# Patient Record
Sex: FEMALE | Race: BLACK OR AFRICAN AMERICAN | NOT HISPANIC OR LATINO | URBAN - METROPOLITAN AREA
[De-identification: names, ages, dates, MRNs, and addresses within clinical notes are randomized per-mention and may not be internally consistent; named-entity substitution may affect disease eponyms.]

---

## 2021-01-19 ENCOUNTER — INPATIENT (INPATIENT)
Facility: HOSPITAL | Age: 61
LOS: 5 days | Discharge: HOME | End: 2021-01-25
Attending: HOSPITALIST | Admitting: HOSPITALIST
Payer: MEDICAID

## 2021-01-19 VITALS
DIASTOLIC BLOOD PRESSURE: 119 MMHG | HEART RATE: 98 BPM | TEMPERATURE: 97 F | SYSTOLIC BLOOD PRESSURE: 243 MMHG | OXYGEN SATURATION: 100 % | RESPIRATION RATE: 20 BRPM

## 2021-01-19 LAB
ALBUMIN SERPL ELPH-MCNC: 4.3 G/DL — SIGNIFICANT CHANGE UP (ref 3.3–5)
ALP SERPL-CCNC: 182 U/L — HIGH (ref 40–120)
ALT FLD-CCNC: 13 U/L — SIGNIFICANT CHANGE UP (ref 4–33)
ANION GAP SERPL CALC-SCNC: 10 MMOL/L — SIGNIFICANT CHANGE UP (ref 7–14)
APTT BLD: 37 SEC — HIGH (ref 27–36.3)
AST SERPL-CCNC: 16 U/L — SIGNIFICANT CHANGE UP (ref 4–32)
BASE EXCESS BLDV CALC-SCNC: 4.3 MMOL/L — HIGH (ref -3–2)
BASOPHILS # BLD AUTO: 0.02 K/UL — SIGNIFICANT CHANGE UP (ref 0–0.2)
BASOPHILS NFR BLD AUTO: 0.2 % — SIGNIFICANT CHANGE UP (ref 0–2)
BILIRUB SERPL-MCNC: 0.2 MG/DL — SIGNIFICANT CHANGE UP (ref 0.2–1.2)
BUN SERPL-MCNC: 8 MG/DL — SIGNIFICANT CHANGE UP (ref 7–23)
CALCIUM SERPL-MCNC: 9.9 MG/DL — SIGNIFICANT CHANGE UP (ref 8.4–10.5)
CHLORIDE SERPL-SCNC: 102 MMOL/L — SIGNIFICANT CHANGE UP (ref 98–107)
CO2 SERPL-SCNC: 26 MMOL/L — SIGNIFICANT CHANGE UP (ref 22–31)
CREAT SERPL-MCNC: 0.69 MG/DL — SIGNIFICANT CHANGE UP (ref 0.5–1.3)
EOSINOPHIL # BLD AUTO: 0.06 K/UL — SIGNIFICANT CHANGE UP (ref 0–0.5)
EOSINOPHIL NFR BLD AUTO: 0.5 % — SIGNIFICANT CHANGE UP (ref 0–6)
GLUCOSE SERPL-MCNC: 262 MG/DL — HIGH (ref 70–99)
HCO3 BLDV-SCNC: 26 MMOL/L — SIGNIFICANT CHANGE UP (ref 20–27)
HCT VFR BLD CALC: 49.2 % — HIGH (ref 34.5–45)
HGB BLD-MCNC: 14.7 G/DL — SIGNIFICANT CHANGE UP (ref 11.5–15.5)
IANC: 5.69 K/UL — SIGNIFICANT CHANGE UP (ref 1.5–8.5)
IMM GRANULOCYTES NFR BLD AUTO: 0.2 % — SIGNIFICANT CHANGE UP (ref 0–1.5)
INR BLD: 1.02 RATIO — SIGNIFICANT CHANGE UP (ref 0.88–1.16)
LYMPHOCYTES # BLD AUTO: 4.74 K/UL — HIGH (ref 1–3.3)
LYMPHOCYTES # BLD AUTO: 41.9 % — SIGNIFICANT CHANGE UP (ref 13–44)
MCHC RBC-ENTMCNC: 25.5 PG — LOW (ref 27–34)
MCHC RBC-ENTMCNC: 29.9 GM/DL — LOW (ref 32–36)
MCV RBC AUTO: 85.4 FL — SIGNIFICANT CHANGE UP (ref 80–100)
MONOCYTES # BLD AUTO: 0.79 K/UL — SIGNIFICANT CHANGE UP (ref 0–0.9)
MONOCYTES NFR BLD AUTO: 7 % — SIGNIFICANT CHANGE UP (ref 2–14)
NEUTROPHILS # BLD AUTO: 5.69 K/UL — SIGNIFICANT CHANGE UP (ref 1.8–7.4)
NEUTROPHILS NFR BLD AUTO: 50.2 % — SIGNIFICANT CHANGE UP (ref 43–77)
NRBC # BLD: 0 /100 WBCS — SIGNIFICANT CHANGE UP
NRBC # FLD: 0 K/UL — SIGNIFICANT CHANGE UP
PCO2 BLDV: 57 MMHG — HIGH (ref 41–51)
PH BLDV: 7.34 — SIGNIFICANT CHANGE UP (ref 7.32–7.43)
PLATELET # BLD AUTO: 371 K/UL — SIGNIFICANT CHANGE UP (ref 150–400)
PO2 BLDV: 28 MMHG — LOW (ref 35–40)
POTASSIUM SERPL-MCNC: 4.1 MMOL/L — SIGNIFICANT CHANGE UP (ref 3.5–5.3)
POTASSIUM SERPL-SCNC: 4.1 MMOL/L — SIGNIFICANT CHANGE UP (ref 3.5–5.3)
PROT SERPL-MCNC: 7.8 G/DL — SIGNIFICANT CHANGE UP (ref 6–8.3)
PROTHROM AB SERPL-ACNC: 11.6 SEC — SIGNIFICANT CHANGE UP (ref 10.6–13.6)
RBC # BLD: 5.76 M/UL — HIGH (ref 3.8–5.2)
RBC # FLD: 13.2 % — SIGNIFICANT CHANGE UP (ref 10.3–14.5)
SAO2 % BLDV: 48.6 % — LOW (ref 60–85)
SODIUM SERPL-SCNC: 138 MMOL/L — SIGNIFICANT CHANGE UP (ref 135–145)
TROPONIN T, HIGH SENSITIVITY RESULT: 6 NG/L — SIGNIFICANT CHANGE UP
WBC # BLD: 11.32 K/UL — HIGH (ref 3.8–10.5)
WBC # FLD AUTO: 11.32 K/UL — HIGH (ref 3.8–10.5)

## 2021-01-19 PROCEDURE — 70496 CT ANGIOGRAPHY HEAD: CPT | Mod: 26

## 2021-01-19 PROCEDURE — 99285 EMERGENCY DEPT VISIT HI MDM: CPT

## 2021-01-19 PROCEDURE — 70498 CT ANGIOGRAPHY NECK: CPT | Mod: 26

## 2021-01-19 PROCEDURE — 70450 CT HEAD/BRAIN W/O DYE: CPT | Mod: 26,59

## 2021-01-19 RX ORDER — LABETALOL HCL 100 MG
10 TABLET ORAL ONCE
Refills: 0 | Status: COMPLETED | OUTPATIENT
Start: 2021-01-19 | End: 2021-01-19

## 2021-01-19 RX ADMIN — Medication 10 MILLIGRAM(S): at 23:03

## 2021-01-19 NOTE — ED ADULT NURSE REASSESSMENT NOTE - NS ED NURSE REASSESS COMMENT FT1
Report received from facilitator GERARD PIPER Pt. received A&Ox4, with 18G IV in left ac. In no acute distress. Respirations even & unlabored. Side rails up, stretcher in lowest position, Will continue to monitor.

## 2021-01-19 NOTE — ED ADULT TRIAGE NOTE - CHIEF COMPLAINT QUOTE
Chief complaint left visual changes "blurred vision" starting 9am this morning. symmetrical smile. equal facial sensation. no pronator drift. HX DM type 2 and HTN, non compliant with medications for diabetes. Code Stroke called. Pt taken directly to CT. FS taken. Charge RN aware

## 2021-01-19 NOTE — ED PROVIDER NOTE - CLINICAL SUMMARY MEDICAL DECISION MAKING FREE TEXT BOX
60F with hx of HTN, DM (non-compliant with medications) presents as a code stroke. patient c/o blurry and double vision from the left eye with HA since waking up this morning.  plan  - labs  - ct head, cta head/neck  - ekg  - neuro  - BP control  - admit

## 2021-01-19 NOTE — ED PROVIDER NOTE - ATTENDING CONTRIBUTION TO CARE
60F with hx of HTN, DM (non-compliant with medications) presents as a code stroke. patient c/o blurry and double vision from the left eye with HA since waking up this morning. mentioned it to her son who brought her to the ED. denies any eye pain, weakness, numbness, tingling, diff swallowing or speaking.     ***GEN - NAD; well appearing; A+O x3 ***HEAD - NC/AT ***EYES/NOSE - PERRL, mucous membranes moist, no discharge ***THROAT: Oral cavity and pharynx normal. No inflammation, swelling, exudate, or lesions.  ***NECK: Neck supple, non-tender without lymphadenopathy, no masses, no thyromegaly.   ***PULMONARY - CTA b/l, symmetric breath sounds. ***CARDIAC -s1s2, RRR, no M,G,R  ***ABDOMEN - +BS, ND, NT, soft, no guarding, no rebound, no masses   ***BACK - no CVA tenderness, Normal  spine ***EXTREMITIES - symmetric pulses, 2+ dp, capillary refill < 2 seconds, no clubbing, no cyanosis, no edema ***SKIN - no rash or bruising   ***NEUROLOGIC - alert, CN 2-12 intact on right with left sided lateral gaze deficit, reflexes nl, sensation nl, coordination nl, finger to nose nl, romberg negative, motor 5/5 RUE/LUE/RLE/LLE/EHL/Plantar flexion, no pronator drift, gait nl, ***PSYCH - insight and judgment nl, memory nl, affect nl, thought nl    MDM: 60F with likely acute cva. code stroke activated. neuro eval, cth, cta head and neck, labs, mri, bp and dm mgmt. 60F with hx of HTN, DM (non-compliant with medications) presents as a code stroke. patient c/o blurry and double vision from the left eye with HA since waking up this morning. mentioned it to her son who brought her to the ED. denies any eye pain, weakness, numbness, tingling, diff swallowing or speaking.     ***GEN - NAD; well appearing; A+O x3 ***HEAD - NC/AT ***EYES/NOSE - PERRL, mucous membranes moist, no discharge ***THROAT: Oral cavity and pharynx normal. No inflammation, swelling, exudate, or lesions.  ***NECK: Neck supple, non-tender without lymphadenopathy, no masses, no thyromegaly.   ***PULMONARY - CTA b/l, symmetric breath sounds. ***CARDIAC -s1s2, RRR, no M,G,R  ***ABDOMEN - +BS, ND, NT, soft, no guarding, no rebound, no masses   ***BACK - no CVA tenderness, Normal  spine ***EXTREMITIES - symmetric pulses, 2+ dp, capillary refill < 2 seconds, no clubbing, no cyanosis, no edema ***SKIN - no rash or bruising   ***NEUROLOGIC - alert, CN 2-12 intact on right with left sided lateral gaze deficit, reflexes nl, sensation nl, coordination nl, finger to nose nl, romberg negative, motor 5/5 RUE/LUE/RLE/LLE/EHL/Plantar flexion, no pronator drift, gait nl, ***PSYCH - insight and judgment nl, memory nl, affect nl, thought nl.    MDM: 60F with likely acute cva. code stroke activated. neuro eval, cth, cta head and neck, labs, mri, bp and dm mgmt.

## 2021-01-19 NOTE — ED ADULT TRIAGE NOTE - ARRIVAL FROM
Notified pt and pt voiced understanding , labs appt made and mailed . Pt states he was suppose to get a Open mri done  Please advise , I do not see any orders    Home

## 2021-01-19 NOTE — ED PROVIDER NOTE - OBJECTIVE STATEMENT
60F with hx of HTN, DM (non-compliant with medications) presents as a code stroke. patient c/o blurry and double vision from the left eye with HA since waking up this morning. mentioned it to her son who brought her to the ED. denies any eye pain, weakness, numbness, tingling, diff swallowing or speaking. CODE STROKE called in triage. Neuro bedside.

## 2021-01-19 NOTE — ED PROVIDER NOTE - NEUROLOGICAL, MLM
alert, CN 2-12 intact on right with left sided lateral gaze deficit, reflexes nl, sensation nl, coordination nl, finger to nose nl, romberg negative, motor 5/5 RUE/LUE/RLE/LLE/EHL/Plantar flexion, no pronator drift, gait nl

## 2021-01-19 NOTE — ED ADULT NURSE NOTE - OBJECTIVE STATEMENT
Nathanael RN: 61 y/o F received to CT scan for a code stroke. Pt A&ox4 and ambulatory. Pt states since this morning she has had L eye vision changes. Pt endorses double vision, ha, and unsteady gait. Pt respirations even and unlabored. pt abdomen soft nontender nondistended. pt skin intact. Pt denies fevers, chills, sob, degroot, lightheadedness, dizziness, or palpations. Vital signs as noted, call bell in reach, comfort measures provide 18G IV placed L AC, labs drawn and sent, will give report to primary RN. f

## 2021-01-20 DIAGNOSIS — R94.31 ABNORMAL ELECTROCARDIOGRAM [ECG] [EKG]: ICD-10-CM

## 2021-01-20 DIAGNOSIS — H53.9 UNSPECIFIED VISUAL DISTURBANCE: ICD-10-CM

## 2021-01-20 DIAGNOSIS — E78.5 HYPERLIPIDEMIA, UNSPECIFIED: ICD-10-CM

## 2021-01-20 DIAGNOSIS — D72.829 ELEVATED WHITE BLOOD CELL COUNT, UNSPECIFIED: ICD-10-CM

## 2021-01-20 DIAGNOSIS — E11.9 TYPE 2 DIABETES MELLITUS WITHOUT COMPLICATIONS: ICD-10-CM

## 2021-01-20 DIAGNOSIS — I16.0 HYPERTENSIVE URGENCY: ICD-10-CM

## 2021-01-20 DIAGNOSIS — R79.89 OTHER SPECIFIED ABNORMAL FINDINGS OF BLOOD CHEMISTRY: ICD-10-CM

## 2021-01-20 DIAGNOSIS — Z02.9 ENCOUNTER FOR ADMINISTRATIVE EXAMINATIONS, UNSPECIFIED: ICD-10-CM

## 2021-01-20 DIAGNOSIS — I10 ESSENTIAL (PRIMARY) HYPERTENSION: ICD-10-CM

## 2021-01-20 DIAGNOSIS — Z29.9 ENCOUNTER FOR PROPHYLACTIC MEASURES, UNSPECIFIED: ICD-10-CM

## 2021-01-20 DIAGNOSIS — Z79.899 OTHER LONG TERM (CURRENT) DRUG THERAPY: ICD-10-CM

## 2021-01-20 DIAGNOSIS — E11.65 TYPE 2 DIABETES MELLITUS WITH HYPERGLYCEMIA: ICD-10-CM

## 2021-01-20 LAB
A1C WITH ESTIMATED AVERAGE GLUCOSE RESULT: 13.5 % — HIGH (ref 4–5.6)
ANION GAP SERPL CALC-SCNC: 7 MMOL/L — SIGNIFICANT CHANGE UP (ref 7–14)
BASOPHILS # BLD AUTO: 0.01 K/UL — SIGNIFICANT CHANGE UP (ref 0–0.2)
BASOPHILS NFR BLD AUTO: 0.1 % — SIGNIFICANT CHANGE UP (ref 0–2)
BUN SERPL-MCNC: 7 MG/DL — SIGNIFICANT CHANGE UP (ref 7–23)
CALCIUM SERPL-MCNC: 9.6 MG/DL — SIGNIFICANT CHANGE UP (ref 8.4–10.5)
CHLORIDE SERPL-SCNC: 103 MMOL/L — SIGNIFICANT CHANGE UP (ref 98–107)
CHOLEST SERPL-MCNC: 175 MG/DL — SIGNIFICANT CHANGE UP
CO2 SERPL-SCNC: 27 MMOL/L — SIGNIFICANT CHANGE UP (ref 22–31)
CREAT SERPL-MCNC: 0.63 MG/DL — SIGNIFICANT CHANGE UP (ref 0.5–1.3)
EOSINOPHIL # BLD AUTO: 0.04 K/UL — SIGNIFICANT CHANGE UP (ref 0–0.5)
EOSINOPHIL NFR BLD AUTO: 0.4 % — SIGNIFICANT CHANGE UP (ref 0–6)
ESTIMATED AVERAGE GLUCOSE: 341 MG/DL — HIGH (ref 68–114)
GLUCOSE BLDC GLUCOMTR-MCNC: 131 MG/DL — HIGH (ref 70–99)
GLUCOSE BLDC GLUCOMTR-MCNC: 224 MG/DL — HIGH (ref 70–99)
GLUCOSE BLDC GLUCOMTR-MCNC: 243 MG/DL — HIGH (ref 70–99)
GLUCOSE BLDC GLUCOMTR-MCNC: 274 MG/DL — HIGH (ref 70–99)
GLUCOSE SERPL-MCNC: 285 MG/DL — HIGH (ref 70–99)
HCT VFR BLD CALC: 44.5 % — SIGNIFICANT CHANGE UP (ref 34.5–45)
HDLC SERPL-MCNC: 33 MG/DL — LOW
HGB BLD-MCNC: 14.1 G/DL — SIGNIFICANT CHANGE UP (ref 11.5–15.5)
IANC: 6.72 K/UL — SIGNIFICANT CHANGE UP (ref 1.5–8.5)
IMM GRANULOCYTES NFR BLD AUTO: 0.6 % — SIGNIFICANT CHANGE UP (ref 0–1.5)
LIPID PNL WITH DIRECT LDL SERPL: 123 MG/DL — HIGH
LYMPHOCYTES # BLD AUTO: 2.57 K/UL — SIGNIFICANT CHANGE UP (ref 1–3.3)
LYMPHOCYTES # BLD AUTO: 25.6 % — SIGNIFICANT CHANGE UP (ref 13–44)
MAGNESIUM SERPL-MCNC: 1.7 MG/DL — SIGNIFICANT CHANGE UP (ref 1.6–2.6)
MCHC RBC-ENTMCNC: 26.4 PG — LOW (ref 27–34)
MCHC RBC-ENTMCNC: 31.7 GM/DL — LOW (ref 32–36)
MCV RBC AUTO: 83.3 FL — SIGNIFICANT CHANGE UP (ref 80–100)
MONOCYTES # BLD AUTO: 0.65 K/UL — SIGNIFICANT CHANGE UP (ref 0–0.9)
MONOCYTES NFR BLD AUTO: 6.5 % — SIGNIFICANT CHANGE UP (ref 2–14)
NEUTROPHILS # BLD AUTO: 6.72 K/UL — SIGNIFICANT CHANGE UP (ref 1.8–7.4)
NEUTROPHILS NFR BLD AUTO: 66.8 % — SIGNIFICANT CHANGE UP (ref 43–77)
NON HDL CHOLESTEROL: 142 MG/DL — HIGH
NRBC # BLD: 0 /100 WBCS — SIGNIFICANT CHANGE UP
NRBC # FLD: 0 K/UL — SIGNIFICANT CHANGE UP
PHOSPHATE SERPL-MCNC: 3.5 MG/DL — SIGNIFICANT CHANGE UP (ref 2.5–4.5)
PLATELET # BLD AUTO: 349 K/UL — SIGNIFICANT CHANGE UP (ref 150–400)
POTASSIUM SERPL-MCNC: 4 MMOL/L — SIGNIFICANT CHANGE UP (ref 3.5–5.3)
POTASSIUM SERPL-SCNC: 4 MMOL/L — SIGNIFICANT CHANGE UP (ref 3.5–5.3)
RBC # BLD: 5.34 M/UL — HIGH (ref 3.8–5.2)
RBC # FLD: 13.1 % — SIGNIFICANT CHANGE UP (ref 10.3–14.5)
SARS-COV-2 RNA SPEC QL NAA+PROBE: SIGNIFICANT CHANGE UP
SODIUM SERPL-SCNC: 137 MMOL/L — SIGNIFICANT CHANGE UP (ref 135–145)
TRIGL SERPL-MCNC: 95 MG/DL — SIGNIFICANT CHANGE UP
TSH SERPL-MCNC: <0.1 UIU/ML — LOW (ref 0.27–4.2)
WBC # BLD: 10.05 K/UL — SIGNIFICANT CHANGE UP (ref 3.8–10.5)
WBC # FLD AUTO: 10.05 K/UL — SIGNIFICANT CHANGE UP (ref 3.8–10.5)

## 2021-01-20 PROCEDURE — 99223 1ST HOSP IP/OBS HIGH 75: CPT

## 2021-01-20 PROCEDURE — 99255 IP/OBS CONSLTJ NEW/EST HI 80: CPT

## 2021-01-20 PROCEDURE — 70551 MRI BRAIN STEM W/O DYE: CPT | Mod: 26

## 2021-01-20 PROCEDURE — 99222 1ST HOSP IP/OBS MODERATE 55: CPT

## 2021-01-20 RX ORDER — SODIUM CHLORIDE 9 MG/ML
3 INJECTION INTRAMUSCULAR; INTRAVENOUS; SUBCUTANEOUS EVERY 8 HOURS
Refills: 0 | Status: DISCONTINUED | OUTPATIENT
Start: 2021-01-20 | End: 2021-01-25

## 2021-01-20 RX ORDER — INSULIN NPH HUM/REG INSULIN HM 70-30/ML
3 VIAL (ML) SUBCUTANEOUS
Qty: 2 | Refills: 0
Start: 2021-01-20

## 2021-01-20 RX ORDER — INSULIN GLARGINE 100 [IU]/ML
12 INJECTION, SOLUTION SUBCUTANEOUS AT BEDTIME
Refills: 0 | Status: DISCONTINUED | OUTPATIENT
Start: 2021-01-20 | End: 2021-01-25

## 2021-01-20 RX ORDER — GLUCAGON INJECTION, SOLUTION 0.5 MG/.1ML
1 INJECTION, SOLUTION SUBCUTANEOUS ONCE
Refills: 0 | Status: DISCONTINUED | OUTPATIENT
Start: 2021-01-20 | End: 2021-01-25

## 2021-01-20 RX ORDER — DEXTROSE 50 % IN WATER 50 %
25 SYRINGE (ML) INTRAVENOUS ONCE
Refills: 0 | Status: DISCONTINUED | OUTPATIENT
Start: 2021-01-20 | End: 2021-01-25

## 2021-01-20 RX ORDER — INSULIN LISPRO 100/ML
4 VIAL (ML) SUBCUTANEOUS
Refills: 0 | Status: DISCONTINUED | OUTPATIENT
Start: 2021-01-20 | End: 2021-01-21

## 2021-01-20 RX ORDER — AMLODIPINE BESYLATE 2.5 MG/1
5 TABLET ORAL DAILY
Refills: 0 | Status: DISCONTINUED | OUTPATIENT
Start: 2021-01-20 | End: 2021-01-20

## 2021-01-20 RX ORDER — METFORMIN HYDROCHLORIDE 850 MG/1
1 TABLET ORAL
Qty: 0 | Refills: 0 | DISCHARGE

## 2021-01-20 RX ORDER — DEXTROSE 50 % IN WATER 50 %
12.5 SYRINGE (ML) INTRAVENOUS ONCE
Refills: 0 | Status: DISCONTINUED | OUTPATIENT
Start: 2021-01-20 | End: 2021-01-25

## 2021-01-20 RX ORDER — INSULIN LISPRO 100/ML
VIAL (ML) SUBCUTANEOUS
Refills: 0 | Status: DISCONTINUED | OUTPATIENT
Start: 2021-01-20 | End: 2021-01-25

## 2021-01-20 RX ORDER — HEPARIN SODIUM 5000 [USP'U]/ML
5000 INJECTION INTRAVENOUS; SUBCUTANEOUS EVERY 12 HOURS
Refills: 0 | Status: DISCONTINUED | OUTPATIENT
Start: 2021-01-20 | End: 2021-01-25

## 2021-01-20 RX ORDER — INSULIN LISPRO 100/ML
VIAL (ML) SUBCUTANEOUS AT BEDTIME
Refills: 0 | Status: DISCONTINUED | OUTPATIENT
Start: 2021-01-20 | End: 2021-01-25

## 2021-01-20 RX ORDER — DEXTROSE 50 % IN WATER 50 %
15 SYRINGE (ML) INTRAVENOUS ONCE
Refills: 0 | Status: DISCONTINUED | OUTPATIENT
Start: 2021-01-20 | End: 2021-01-25

## 2021-01-20 RX ORDER — SODIUM CHLORIDE 9 MG/ML
1000 INJECTION, SOLUTION INTRAVENOUS
Refills: 0 | Status: DISCONTINUED | OUTPATIENT
Start: 2021-01-20 | End: 2021-01-25

## 2021-01-20 RX ORDER — ACETAMINOPHEN 500 MG
650 TABLET ORAL ONCE
Refills: 0 | Status: COMPLETED | OUTPATIENT
Start: 2021-01-20 | End: 2021-01-20

## 2021-01-20 RX ADMIN — Medication 650 MILLIGRAM(S): at 15:57

## 2021-01-20 RX ADMIN — Medication 4 UNIT(S): at 13:27

## 2021-01-20 RX ADMIN — Medication 2: at 09:20

## 2021-01-20 RX ADMIN — Medication 2: at 13:27

## 2021-01-20 RX ADMIN — HEPARIN SODIUM 5000 UNIT(S): 5000 INJECTION INTRAVENOUS; SUBCUTANEOUS at 05:47

## 2021-01-20 RX ADMIN — HEPARIN SODIUM 5000 UNIT(S): 5000 INJECTION INTRAVENOUS; SUBCUTANEOUS at 18:03

## 2021-01-20 RX ADMIN — Medication 4 UNIT(S): at 18:16

## 2021-01-20 RX ADMIN — SODIUM CHLORIDE 3 MILLILITER(S): 9 INJECTION INTRAMUSCULAR; INTRAVENOUS; SUBCUTANEOUS at 12:23

## 2021-01-20 RX ADMIN — SODIUM CHLORIDE 3 MILLILITER(S): 9 INJECTION INTRAMUSCULAR; INTRAVENOUS; SUBCUTANEOUS at 05:01

## 2021-01-20 RX ADMIN — Medication 650 MILLIGRAM(S): at 06:09

## 2021-01-20 NOTE — H&P ADULT - PROBLEM SELECTOR PLAN 1
Admit to tele, continue monitoring.  Neuro recs appreciated.  Permissive HTN up to 220/110 for first 48-72 hours after sympton onset to be followed by gradual normotension.  MRI brain w/o contrast ordered.  Echo with bubble study ordered.  Aspirin 81 mg daily  Atorvastatin 80 mg daily. Titrate for LDL <70.  A1c and lipid panel ordered with AM labs.  Heparin subcutaneous 5000 units q12h.  Patient will need close outpatient PCP follow up management of medical comorbidities and medication compliance.  Upon discharge, please have patient follow up with Stroke Neurology within 1 week:  Tamika Marc NP at [address] [place] [address], [address] [place]  [phone]  Please email patients info to [email or web address] Admit to tele, continue monitoring.  Neuro recs appreciated.  Permissive HTN up to 220/110 for first 48-72 hours after sympton onset to be followed by gradual normotension.  MRI brain w/o contrast ordered.  Echo with bubble study ordered.  Aspirin 81 mg daily  Atorvastatin 80 mg daily. Titrate for LDL <70.  A1c and lipid panel ordered with AM labs.  Heparin subcutaneous 5000 units q12h.  Dysphagia screen passed. Dysphagia diet ordered.  Patient will need close outpatient PCP follow up management of medical comorbidities and medication compliance.  Upon discharge, please have patient follow up with Stroke Neurology within 1 week:  Tamika Marc NP at [address] [facility], [address] [place]  [phone]  Please email patients info to [email or web address] Likely pontine CVA  Admit to tele, continue monitoring.  Neuro recs appreciated.  Permissive HTN up to 220/110 for first 48-72 hours after sympton onset to be followed by gradual normotension.  MRI brain w/o contrast ordered.  Echo with bubble study ordered.  Aspirin 81 mg daily  Atorvastatin 80 mg daily. Titrate for LDL <70.  A1c and lipid panel ordered with AM labs.  Heparin subcutaneous 5000 units q12h.  Dysphagia screen passed. Dysphagia diet ordered.  Patient will need close outpatient PCP follow up management of medical comorbidities and medication compliance.  Upon discharge, please have patient follow up with Stroke Neurology within 1 week:  Tamika Marc NP at [address] [facility], [address] [place]  [phone]  Please email patients info to [email or web address]

## 2021-01-20 NOTE — H&P ADULT - HISTORY OF PRESENT ILLNESS
59 y/o Norwegian creole speaking F with PMH of HTN and type 2 diabetes (On Metformin) presents to the ED complaining of blurry vision in left eye and headcache. Patient states that she woke up yesterday morning with blurry vision in left her eye. She states that she also had a headache and states that she still has it. Patient states that she checked her blood sugar and found it to be in the 200s and decided to come to the ED for evaluation. Patient denies slurred speech, weakness, numbness in extremities. Patient sates that her diabetes and blood pressure medication has finished and that she has not been able to get more. Patient is on metformin 1000 mg BID, glipizide 10 mg daily. Patient is unable to recall what blood pressure medication she is on. Patient denies fever, chills, chest pain, shortness of breath, nausea, vomiting, dysuria.

## 2021-01-20 NOTE — PHYSICAL THERAPY INITIAL EVALUATION ADULT - GENERAL OBSERVATIONS, REHAB EVAL
Consult received, chart reviewed. Patient received standing in room, no apparent distress, +tele, Arlyn WALLACE RN present.

## 2021-01-20 NOTE — SPEECH LANGUAGE PATHOLOGY EVALUATION - SLP PATIENT PROFILE REVIEW
----- Message from Donna Forte MD sent at 1/11/2019  5:32 PM EST -----  Mammogram normal.  Repeat 1 year  
yes

## 2021-01-20 NOTE — OCCUPATIONAL THERAPY INITIAL EVALUATION ADULT - PERTINENT HX OF CURRENT PROBLEM, REHAB EVAL
59 y/o [place] Islander creole speaking F with PMH of HTN and type 2 diabetes (On Metformin) presents to the ED complaining of blurry vision in left eye and headache. Pt is a 61 y/o Venezuelan creole speaking F with PMH of HTN and type 2 diabetes (On Metformin) presents to the ED complaining of blurry vision in left eye and headache.

## 2021-01-20 NOTE — H&P ADULT - NSHPLABSRESULTS_GEN_ALL_CORE
Vital Signs Last 24 Hrs  T(C): 36.8 (20 Jan 2021 02:52), Max: 37.1 (20 Jan 2021 02:17)  T(F): 98.3 (20 Jan 2021 02:52), Max: 98.8 (20 Jan 2021 02:17)  HR: 89 (20 Jan 2021 02:52) (85 - 98)  BP: 160/88 (20 Jan 2021 02:52) (160/88 - 249/102)  BP(mean): --  RR: 17 (20 Jan 2021 02:52) (16 - 20)  SpO2: 98% (20 Jan 2021 02:52) (98% - 100%)                          14.7   11.32 )-----------( 371      ( 19 Jan 2021 23:06 )             49.2   01-19    138  |  102  |  8   ----------------------------<  262<H>  4.1   |  26  |  0.69    Ca    9.9      19 Jan 2021 23:06    TPro  7.8  /  Alb  4.3  /  TBili  0.2  /  DBili  x   /  AST  16  /  ALT  13  /  AlkPhos  182<H>  01-19    PT/INR - ( 19 Jan 2021 23:06 )   PT: 11.6 sec;   INR: 1.02 ratio         PTT - ( 19 Jan 2021 23:06 )  PTT:37.0 sec    23:06 - VBG - pH: 7.34  | pCO2: 57    | pO2: 28    | Lactate:          COVID PCR: Negative.    CTA Head and Neck:  CTA neck: Mild stenosis proximal right internal carotid artery by NASCET criteria of about 39%. No focal occlusion, hemodynamically significant stenosis or dissection.    Asymmetric right aryepiglottic fold enhancement which could be related to asymmetry in the venous plexus drainage, however, associated effacement of the right pyriform sinuses somewhat concerning. Follow-up with ENT is recommended with correlation with direct visualization. Nonemergent follow-up contrast enhanced neck MRI can also be performed. Heterogeneous enlarged thyroid gland with bilateral nodules as above.    CTA head: No focal occlusion or hemodynamically significant proximal stenosis. 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 No additional aneurysms or vascular malformation.    CT Brain:  There is no evidence of an acute intracranial hemorrhage, mass effect from vasogenic edema or evidence for acute large vascular territory infarct. Partially empty sella incidentally noted. The basal cisterns are not effaced. No downward herniation of the cerebellar tonsils.    The ventricles, sulci and cisternal spaces are unremarkable in size and configuration for the patient's stated age. There is no midline shift or extra-axial fluid collection.    The visualized paranasal sinuses and mastoid air cells are clear.  The globes are symmetric in size and contour. No displaced calvarial fracture.    IMPRESSION:  No evidence of acute intracranial hemorrhage, midline shift, hydrocephalus or signs of raised intracranial pressure.    EKG: Sinus rhythm at 93 bpm. QT/QTc 432/537.

## 2021-01-20 NOTE — PHYSICAL THERAPY INITIAL EVALUATION ADULT - PERTINENT HX OF CURRENT PROBLEM, REHAB EVAL
Pt. admitted for visual changes, hypertensive urgency. 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

## 2021-01-20 NOTE — CONSULT NOTE ADULT - PROBLEM SELECTOR RECOMMENDATION 9
- A1c 13.5%, has not been on medications for months  - start basal bolus insulin - Lantus 12 units qhs and Admelog 4 units before meals (self reported weight is 142 lbs)  - low correction scale qac and qhs  - consistent carb diet  - check FS qac and qhs  - RD consult  - will follow - A1c 13.5%, has not been on medications for months  - start basal bolus insulin - Lantus 12 units qhs and Admelog 4 units before meals (self reported weight is 142 lbs)  - low correction scale qac and qhs  - consistent carb diet  - check FS qac and qhs  - RD consult  - will follow  - for discharge: suggest dc on Humulin/Novolin 70/30, doses to be determined, which patient is agreeable to. If insulin requirements remain low, could potentially dc on Metformin and sulfonylurea. Recommend start insulin syringe and vial teaching with patient. She can follow up in the [facility] endocrine clinic on discharge, appt to follow.

## 2021-01-20 NOTE — CONSULT NOTE ADULT - PROBLEM SELECTOR RECOMMENDATION 4
- she has no prior hx of thyroid disease  - recheck TSH with free T4 and total T3 in AM  - may need work up for hyperthyroidism if TFTs consistent with subclinical or overt hyperthyroidism

## 2021-01-20 NOTE — SPEECH LANGUAGE PATHOLOGY EVALUATION - SLP DIAGNOSIS
Patient demonstrated intact receptive and expressive language abilities characterized by intact ability to follow multi-step directives, respond appropriately to simple open-ended questions and identify common objects,  as well as intact naming skills (i.e. confrontational, responsive, generative) and intact picture description. Cognitive linguistic functioning was also within functional limits given adequate orientation to current situation, intact verbal problem solving and short-term memory skills.  In addition, motor speech function was judged to be within functional limits with adequate rate and precise articulatory contacts. Vocal quality was unremarkable.

## 2021-01-20 NOTE — SWALLOW BEDSIDE ASSESSMENT ADULT - SWALLOW EVAL: DIAGNOSIS
Patient demonstrated functional oral and pharyngeal stages of swallowing characterized by adequate oral prep with adequate bolus cohesion, manipulation and transfer, adequate initiation of the pharyngeal onset and palpable hyolaryngeal elevation/excursion with no overt, clinical s/s of laryngeal penetration/aspiration across PO trials of puree, regular solids and thin liquids.

## 2021-01-20 NOTE — CONSULT NOTE ADULT - ASSESSMENT
60y old right handed female with history of HTN, DM2 who presents with L eye visual changes since this morning. LKW 9pm 1/18 prior to going to sleep. Neurologic exam with OS CN 6 palsy with L homonymous hemianopsia. 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 palsy w/ LHH possibly secondary to     Recommend:  [] Permissive HTN up to 220/120 mmHg for first 48-72 hours after the symptom onset followed by gradual normotension  [] MRI brain without contrast - preferably in the CDU, if not then please admit to 4S Telemetry  [] TTE with bubble study for possible valvular heart disease  [] ASA 81 mg PO QD   [] Lipitor 80 mg PO QHS, titrate for LDL , 70  [] HbA1c, LDL and continue with aggressive vascular risk factors modifications   [] DVT ppx if admitted   [] will need close outpatient PCP follow up for management of medical co-morbidities and strict medication compliance    Upon discharge, please have patient follow up with Stroke Neurology within 1 week:  Tamika Marc NP at [address] [facility], [address] [place]  [phone]  Please email patients info to [email or web address]   60y old right handed female with history of HTN, DM2 who presents with L eye visual changes since this morning. LKW 9pm 1/18 prior to going to sleep. Neurologic exam with OS CN 6 palsy with L homonymous hemianopsia, horizontal diplopia. 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 Less likely for nerve to be affected in the subarachnoid space, petrous apex or cavernous sinus given isolated findings. Can also consider diabetic/microvascular sixth nerve palsy as well.      Recommend:  [] Permissive HTN up to 220/120 mmHg for first 48-72 hours after the symptom onset followed by gradual normotension  [] MRI brain without contrast - preferably in the CDU, if not then please admit to 4S Telemetry  [] TTE with bubble study for possible valvular heart disease  [] ASA 81 mg PO QD   [] Lipitor 80 mg PO QHS, titrate for LDL , 70  [] HbA1c, LDL and continue with aggressive vascular risk factors modifications   [] DVT ppx if admitted   [] will need close outpatient PCP follow up for management of medical co-morbidities and strict medication compliance    Upon discharge, please have patient follow up with Stroke Neurology within 1 week:  Tamika Marc NP at [address] [facility], [address] [place]  [phone]  Please email patients info to [email or web address]

## 2021-01-20 NOTE — ED ADULT NURSE REASSESSMENT NOTE - NS ED NURSE REASSESS COMMENT FT1
No acute distress. Pt. admitted to [facility], awake & alert. All extremities strong and equal. Report given to [facility] 2 GERARD Narvaez. Pt. transported to [facility] 2 at present.

## 2021-01-20 NOTE — CONSULT NOTE ADULT - ASSESSMENT
60 year old Guatemalan creole speaking woman with PMH of HTN, HLD, uncontrolled DM2, presents to the ED complaining of blurry vision in left eye and headache, found to have possible pontine CVA. Consult called for management of uncontrolled DM2, A1c is 13.5%. Also found to have low TSH (<0.1). High risk patient with high level decision making, at high risk of worsening microvascular and macrovascular complications. BG goal 100-180 mg/dL.

## 2021-01-20 NOTE — H&P ADULT - NEUROLOGICAL DETAILS
alert and oriented x 3/responds to verbal commands/sensation intact/cranial nerves intact/normal strength alert and oriented x 3/responds to verbal commands/sensation intact/normal strength

## 2021-01-20 NOTE — CONSULT NOTE ADULT - SUBJECTIVE AND OBJECTIVE BOX
HPI:  Patient is a 60y old right handed female with history of HTN, DM2 who presents with L eye visual changes since this morning. LKW 9pm 1/18 prior to going to sleep. Patient is Creole speaking and her son interprets for her. Patient states she noticed blurry vision in her L eye since this morning but didnt seek evaluation, rather noticed her sugar was in the high 200s at home and came to the ED for hyperglycemia. She was previously told to take insulin but has not started taking it. Patient is also supposed to be taking ASA 81mg, but is no longer taking it per son. Patient is poorly compliant with her medications and has not been seeing doctors.   CT Head-  negative  CT Head/Neck - mild R ICA stenosis, 1mm Acomm aneurysm  NIHSS: 3, pre-MRS: 0  Not a tPA candidate as outside the window, not a MT candidate given lack of LVO and low NIHSS        MEDICATIONS  (STANDING):    MEDICATIONS  (PRN):    PAST MEDICAL & SURGICAL HISTORY:  No pertinent past medical history    No significant past surgical history      FAMILY HISTORY:    Allergies    No Known Allergies    Intolerances        SHx - No smoking, No ETOH, No drug abuse    Review of Systems:  A 10 system ROS was performed and is negative except for those mentioned in HPI      Vital Signs Last 24 Hrs  T(C): 35.9 (19 Jan 2021 22:00), Max: 35.9 (19 Jan 2021 22:00)  T(F): 96.6 (19 Jan 2021 22:00), Max: 96.6 (19 Jan 2021 22:00)  HR: 96 (19 Jan 2021 23:03) (93 - 98)  BP: 211/89 (19 Jan 2021 23:52) (211/89 - 249/102)  BP(mean): --  RR: 18 (19 Jan 2021 23:03) (18 - 20)  SpO2: 100% (19 Jan 2021 23:03) (100% - 100%)    Neurological (>12):  MS: Awake, alert, oriented to person, place, situation, time. Normal affect. Follows all commands.    Language: Speech is clear, fluent with good repetition & comprehension     CNs: PERRLA (R = 3mm, L = 3mm). OS CN 6 palsy with L homonymous hemianopsia V1-3 intact to LT/pinprick,  No facial asymmetry b/l, Tongue midline    Motor: Normal muscle bulk & tone. No drift.               Deltoid	Biceps	Triceps	Wrist	Finger ABd	   R	5	5	5	5	5		5 	  L	5	5	5	5	5		5    	H-Flex	H-Ext	H-ABd	H-ADd	K-Flex	K-Ext	D-Flex	P-Flex  R	5	5	5	5	5	5	5	5 	   L	5	5	5	5	5	5	5	5	     Sensation: Intact to LT throughout    Cortical: Extinction on DSS (neglect): none    Reflexes:              Biceps(C5)       BR(C6)     Triceps(C7)               Patellar(L4)    Achilles(S1)    Plantar Resp  R	2	          2	             2		        2		    2		Down   L	2	          2	             2		        2		    2		Down     Coordination: No dysmetria to FTN    Gait: deferred        01-19    138  |  102  |  8   ----------------------------<  262<H>  4.1   |  26  |  0.69    Ca    9.9      19 Jan 2021 23:06    TPro  7.8  /  Alb  4.3  /  TBili  0.2  /  DBili  x   /  AST  16  /  ALT  13  /  AlkPhos  182<H>  01-19 01-19    138  |  102  |  8   ----------------------------<  262<H>  4.1   |  26  |  0.69    Ca    9.9      19 Jan 2021 23:06    TPro  7.8  /  Alb  4.3  /  TBili  0.2  /  DBili  x   /  AST  16  /  ALT  13  /  AlkPhos  182<H>  01-19                          14.7   11.32 )-----------( 371      ( 19 Jan 2021 23:06 )             49.2       Radiology                 
Patient is a 60y old  Female who presents with a chief complaint of Visual changes, hypertensive urgency. (20 Jan 2021 11:51)      HPI:  59 y/o Russian creole speaking F with PMH of HTN and type 2 diabetes (On Metformin) presents to the ED complaining of blurry vision in left eye and headcache. Patient states that she woke up yesterday morning with blurry vision in left her eye. She states that she also had a headache and states that she still has it. Patient states that she checked her blood sugar and found it to be in the 200s and decided to come to the ED for evaluation. Patient denies slurred speech, weakness, numbness in extremities. Patient sates that her diabetes and blood pressure medication has finished and that she has not been able to get more. Patient is on metformin 1000 mg BID, glipizide 10 mg daily. Patient is unable to recall what blood pressure medication she is on. Patient denies fever, chills, chest pain, shortness of breath, nausea, vomiting, dysuria.  (20 Jan 2021 03:52)    Patient interviewed with elisabeth creole  ID [id number]  Patient reports mild headache 4/10, and blurry vision. Denies weakness or sensory loss.     REVIEW OF SYSTEMS  Constitutional - No fever, No weight loss, No fatigue  HEENT - No eye pain, + visual disturbances, No difficulty hearing, No tinnitus, No vertigo, No neck pain  Respiratory - No cough, No wheezing, No shortness of breath  Cardiovascular - No chest pain, No palpitations  Gastrointestinal - No abdominal pain, No nausea, No vomiting, No diarrhea, No constipation  Genitourinary - No dysuria, No frequency, No hematuria, No incontinence  Neurological - + headaches, No memory loss, No loss of strength, No numbness, No tremors  Skin - No itching, No rashes, No lesions   Endocrine - No temperature intolerance  Musculoskeletal - No joint pain, No joint swelling, No muscle pain  Psychiatric - No depression, No anxiety    PAST MEDICAL & SURGICAL HISTORY  Hypertension    Type 2 diabetes mellitus    No pertinent past medical history    No significant past surgical history        SOCIAL HISTORY  Smoking - Denied  EtOH - Denied   Drugs - Denied    FUNCTIONAL HISTORY  Lives with her children in apartment without stairs  Independent at baseline without device    CURRENT FUNCTIONAL STATUS    Transfer: Stand to Sit:     · Level of Mono	contact guard  · Physical Assist/Nonphysical Assist	1 person assist; nonverbal cues (demo/gestures); verbal cues  · Weight-Bearing Restrictions	weight-bearing as tolerated    Sit/Stand Transfer Safety Analysis:     · Impairments Contributing to Impaired Transfers	impaired balance; decreased strength    Gait Skills:     · Level of Mono	contact guard  · Physical Assist/Nonphysical Assist	1 person assist; nonverbal cues (demo/gestures); verbal cues  · Weight-Bearing Restrictions	weight-bearing as tolerated  · Assistive Device	PT handheld assist  · Gait Distance	25 feet x 2 trials      FAMILY HISTORY   FH: type 2 diabetes  FH: hypertension        RECENT LABS/IMAGING  < from: CT Brain Stroke Protocol (01.19.21 @ 22:29) >    EXAM:  CT BRAIN STROKE PROTOCOL        PROCEDURE DATE:  Jan 19 2021         INTERPRETATION:  CLINICAL INFORMATION: Code stroke. Left cranial nerve 6 palsy. Left eye vision changes.    TECHNIQUE: Noncontrast axial CT images were acquired of the head. Two-dimensional sagittal and coronal reformats were generated. RAPID artificial intelligence was used for perfusion analysis and for preliminary evaluation of intracranial hemorrhage.    COMPARISON STUDY: No similar prior comparisons available.    FINDINGS:  There is no evidence of an acute intracranial hemorrhage, mass effect from vasogenic edema or evidence for acute large vascular territory infarct. Partially empty sella incidentally noted. The basal cisterns are not effaced. No downward herniation of the cerebellar tonsils.    The ventricles, sulci and cisternal spaces are unremarkable in size and configuration for the patient's stated age. There is no midline shift or extra-axial fluid collection.    The visualized paranasal sinuses andmastoid air cells are clear.  The globes are symmetric in size and contour. No displaced calvarial fracture.    IMPRESSION:  No evidence of acute intracranial hemorrhage, midline shift, hydrocephalus or signs of raised intracranial pressure.    Thesefindings were discussed with Dr. Estrada at 1/19/2021 10:29 PM by Dr. Carolina with read back.        < end of copied text >    CBC Full  -  ( 20 Jan 2021 06:26 )  WBC Count : 10.05 K/uL  RBC Count : 5.34 M/uL  Hemoglobin : 14.1 g/dL  Hematocrit : 44.5 %  Platelet Count - Automated : 349 K/uL  Mean Cell Volume : 83.3 fL  Mean Cell Hemoglobin : 26.4 pg  Mean Cell Hemoglobin Concentration : 31.7 gm/dL  Auto Neutrophil # : 6.72 K/uL  Auto Lymphocyte # : 2.57 K/uL  Auto Monocyte # : 0.65 K/uL  Auto Eosinophil # : 0.04 K/uL  Auto Basophil # : 0.01 K/uL  Auto Neutrophil % : 66.8 %  Auto Lymphocyte % : 25.6 %  Auto Monocyte % : 6.5 %  Auto Eosinophil % : 0.4 %  Auto Basophil % : 0.1 %    01-20    137  |  103  |  7   ----------------------------<  285<H>  4.0   |  27  |  0.63    Ca    9.6      20 Jan 2021 06:26  Phos  3.5     01-20  Mg     1.7     01-20    TPro  7.8  /  Alb  4.3  /  TBili  0.2  /  DBili  x   /  AST  16  /  ALT  13  /  AlkPhos  182<H>  01-19        VITALS  T(C): 36.7 (01-20-21 @ 14:26), Max: 37.1 (01-20-21 @ 02:17)  HR: 98 (01-20-21 @ 14:26) (85 - 98)  BP: 176/92 (01-20-21 @ 14:26) (160/88 - 249/102)  RR: 17 (01-20-21 @ 14:26) (16 - 20)  SpO2: 99% (01-20-21 @ 14:26) (98% - 100%)  Wt(kg): --    ALLERGIES  No Known Allergies      MEDICATIONS   acetaminophen   Tablet .. 650 milliGRAM(s) Oral once  dextrose 40% Gel 15 Gram(s) Oral once  dextrose 5%. 1000 milliLiter(s) IV Continuous <Continuous>  dextrose 5%. 1000 milliLiter(s) IV Continuous <Continuous>  dextrose 50% Injectable 25 Gram(s) IV Push once  dextrose 50% Injectable 12.5 Gram(s) IV Push once  dextrose 50% Injectable 25 Gram(s) IV Push once  glucagon  Injectable 1 milliGRAM(s) IntraMuscular once  heparin   Injectable 5000 Unit(s) SubCutaneous every 12 hours  insulin glargine Injectable (LANTUS) 12 Unit(s) SubCutaneous at bedtime  insulin lispro (ADMELOG) corrective regimen sliding scale   SubCutaneous three times a day before meals  insulin lispro (ADMELOG) corrective regimen sliding scale   SubCutaneous at bedtime  insulin lispro Injectable (ADMELOG) 4 Unit(s) SubCutaneous three times a day before meals  sodium chloride 0.9% lock flush 3 milliLiter(s) IV Push every 8 hours      ----------------------------------------------------------------------------------------  PHYSICAL EXAM  Constitutional - NAD, Comfortable  HEENT - NCAT, 6th nerve palsy, limited L eye abduction  Neck - Supple, No limited ROM  Chest - no respiratory distress  Cardiovascular - no edema  Abdomen -  Soft, NTND  Extremities - No C/C/E, No calf tenderness   Neurologic Exam -                    Cognitive - Awake, Alert, AAO to self, place, date, year, situation     Communication - Fluent, No dysarthria     Cranial Nerves - 6th nerve palsy     Motor - No focal deficits                    LEFT    UE - ShAB 5/5, EF 5/5, EE 5/5, WE 5/5,  5/5                    RIGHT UE - ShAB 5/5, EF 5/5, EE 5/5, WE 5/5,  5/5                    LEFT    LE - HF 5/5, KE 5/5, DF 5/5, PF 5/5                    RIGHT LE - HF 5/5, KE 5/5, DF 5/5, PF 5/5        Sensory - Intact to LT      OculoVestibular - No saccades, No nystagmus, VOR         Balance - WNL Static  Psychiatric - Mood stable, Affect WNL  ----------------------------------------------------------------------------------------  ASSESSMENT/PLAN  60 year old female presenting with blurry vision and headache  R ICA stenosis, possible ischemia vs microvascular 6th nerve palsy.  MRI brain pending, TTE pending  Pain -tylenol prn  DVT PPX - heparin  continue bedside PT, OT eval in progress  Rehab -     Recommend DC HOME with outpatient PT/OT when medically cleared
  HPI:  Patient is a 60 year old Citizen of [place] creole speaking woman with PMH of HTN, HLD, uncontrolled DM2, presents to the ED complaining of blurry vision in left eye and headache, found to have possible pontine CVA. Consult called for management of uncontrolled DM2, A1c is 13.5%. Also found to have low TSH (<0.1). Patient seen at bedside. Cottle  ID# [id number]. Patient reports being diagnosed with DM2 more than 10 years ago, was following with her PCP, does not have an endocrinologist. Does not have insurance at this time. Was on Metformin 1 gram BID and Glipizide 10 mg ER daily, but ran out months ago so has not been taking. She was still checking BG periodically and noted this past week that her BG was in the 400's. She has neuropathy in the feet. No dilated eye exam in years, has never received laser or injections to the eyes. No known nephropathy.    Also found to have TSH < 0.1 here. She denies prior hx of thyroid disease and has never been on thyroidal medications. She does not have neck complaints, chest pain, abdominal pain. BP elevated here, but no tachycardia. Did receive IV contrast here. Reports hx of thyroid disease in her sister (unclear what).    States that she weighs about 142 lbs.     PAST MEDICAL & SURGICAL HISTORY:  Hypertension    Type 2 diabetes mellitus    No significant past surgical history    FAMILY HISTORY:  type 2 diabetes in multiple family members  + thyroid disease in sister   FH: hypertension      Social History:  no cigarette use  no alcohol lives  lives with son     Outpatient Medications:  Metformin 1 gram BID  Glipizide 10 mg ER daily  Amitriptyline 25 mg daily      MEDICATIONS  (STANDING):  dextrose 40% Gel 15 Gram(s) Oral once  dextrose 5%. 1000 milliLiter(s) (50 mL/Hr) IV Continuous <Continuous>  dextrose 5%. 1000 milliLiter(s) (100 mL/Hr) IV Continuous <Continuous>  dextrose 50% Injectable 25 Gram(s) IV Push once  dextrose 50% Injectable 12.5 Gram(s) IV Push once  dextrose 50% Injectable 25 Gram(s) IV Push once  glucagon  Injectable 1 milliGRAM(s) IntraMuscular once  heparin   Injectable 5000 Unit(s) SubCutaneous every 12 hours  insulin lispro (ADMELOG) corrective regimen sliding scale   SubCutaneous three times a day before meals  insulin lispro (ADMELOG) corrective regimen sliding scale   SubCutaneous at bedtime  sodium chloride 0.9% lock flush 3 milliLiter(s) IV Push every 8 hours    MEDICATIONS  (PRN):    Allergies  No Known Allergies    Review of Systems:  Constitutional: No fever  Eyes: + blurry vision  Neuro: No tremors  HEENT: No pain  Cardiovascular: No chest pain, palpitations  Respiratory: No SOB, no cough  GI: No nausea, vomiting, abdominal pain  : No dysuria  Skin: no rash  Endocrine: no polyuria, polydipsia    ALL OTHER SYSTEMS REVIEWED AND NEGATIVE    PHYSICAL EXAM:  VITALS: T(C): 37 (01-20-21 @ 09:00)  T(F): 98.6 (01-20-21 @ 09:00), Max: 98.8 (01-20-21 @ 02:17)  HR: 89 (01-20-21 @ 09:00) (85 - 98)  BP: 183/88 (01-20-21 @ 09:00) (160/88 - 249/102)  RR:  (16 - 20)  SpO2:  (98% - 100%)  Wt(kg): --  GENERAL: NAD, well-developed  EYES: No proptosis, anicteric  HEENT:  Atraumatic, Normocephalic  THYROID: Normal size, no palpable nodules  RESPIRATORY: Clear to auscultation bilaterally; No rales, rhonchi, wheezing  CARDIOVASCULAR: Regular rate and rhythm; No murmurs; no peripheral edema  GI: Soft, nontender, non distended, normal bowel sounds  SKIN: Dry, intact, No ulcers on feet  MUSCULOSKELETAL: Full range of motion, normal strength  PSYCH: Alert and oriented x 3, reactive affect, euthymic mood     POCT Blood Glucose.: 243 mg/dL (01-20-21 @ 08:34)  POCT Blood Glucose.: 274 mg/dL (01-20-21 @ 02:18)  POCT Blood Glucose.: 237 mg/dL (01-19-21 @ 21:59)                          14.1   10.05 )-----------( 349      ( 20 Jan 2021 06:26 )             44.5       01-20    137  |  103  |  7   ----------------------------<  285<H>  4.0   |  27  |  0.63    EGFR if : 112  EGFR if non : 97    Ca    9.6      01-20  Mg     1.7     01-20  Phos  3.5     01-20    TPro  7.8  /  Alb  4.3  /  TBili  0.2  /  DBili  x   /  AST  16  /  ALT  13  /  AlkPhos  182<H>  01-19      Thyroid Function Tests:  01-20 @ 06:26 TSH <0.10 FreeT4 -- T3 -- Anti TPO -- Anti Thyroglobulin Ab -- TSI --      01-20 Chol 175 LDL -- HDL 33<L> Trig 95

## 2021-01-20 NOTE — H&P ADULT - PROBLEM SELECTOR PLAN 4
Patient is supposed to be on metformin 1000 mg BID but states she ran out.  Patient placed on low dose insulin sliding scale and consistent carbohydrate diet.  Consider endocrine consult recommendations for discharge.  Monitor fingersticks. Hemoglobin A1C in am.

## 2021-01-20 NOTE — CONSULT NOTE ADULT - ATTENDING COMMENTS
60-year-old right-handed lady first evaluated at [facility] on 1/20/2021 with diplopia.  History and exam as above.  ROS otherwise negative.  Exam.  Alert and attentive; dense left 6th nerve palsy; visual fields full; gait not tested; remainder of neurologic exam was nonfocal.  CT head (1/19/2021) to my eye was unremarkable.  CTA neck (1/19/2021) to my eye showed minimal plaque in the proximal right ICA.  CTA head (1/19/2021) reportedly showed a 1 mm ACOM aneurysm, not obvious to my eye.  Impression.  She has a history of hypertension and diabetes, and has been poorly adherent with medication.  She apparently was also supposed to be none taking aspirin, apparently for primary prevention, and has also been poorly adherent.  On 1/19/2021 she awoke with visual disturbance, most likely diplopia, as well as left orbital pain.  She has a dense left 6th nerve palsy, whether localization is peripheral or central remains uncertain, but favor peripheral localization.  Suspect "ischemic" or "diabetic" left 6th nerve palsy.  While unlikely, a central lesion in the left rylie, perhaps a small infarct should be screened for.  The incidental reported tiny ACOM aneurysm is of no clinical significance, but should be monitored for stability.  Suggest.  MRI brain as inpatient or outpatient; for now, start Plavix 300 mg loading dose, then 75 mg daily for 3 weeks; aspirin 81 mg daily, indefinitely; if MRI brain shows an acute infarct, then she should continue the plan for dual antiplatelet therapy; if MRI brain is negative for acute infarction, then no role for antiplatelet therapy and Plavix and most likely her home aspirin should be stopped (unless there is a strong indication such as coronary stents, of which I am unaware); repeat CTA or MRA head in 1 year; PT/OT; suspect she will benefit from discharge within the next 24 hours with outpatient follow-up, MRI if not done and further decision-making regarding antiplatelets as outpatient..
Aj Jovel MD   Pager # [phone]  On evenings and weekends, please call the office at [phone] or page endocrine fellow on call. Please note that this patient may be followed by different provider tomorrow. If no answer, contact the office.

## 2021-01-20 NOTE — PHYSICAL THERAPY INITIAL EVALUATION ADULT - ADDITIONAL COMMENTS
Pt. was left seated at edge of stretcher post PT Evaluation, no apparent distress, all lines intact. RN made aware of pt. status.

## 2021-01-20 NOTE — OCCUPATIONAL THERAPY INITIAL EVALUATION ADULT - PLANNED THERAPY INTERVENTIONS, OT EVAL
ADL retraining/balance training/bed mobility training/neuromuscular re-education/strengthening/transfer training

## 2021-01-20 NOTE — ED ADULT NURSE REASSESSMENT NOTE - NS ED NURSE REASSESS COMMENT FT1
Report received from break coverage GERARD PIPER Pt. awake & alert, in no acute distress. Respirations even & unlabored on room air. Admitted, awaiting bed assignment. Will continue to monitor.

## 2021-01-20 NOTE — SWALLOW BEDSIDE ASSESSMENT ADULT - COMMENTS
Per H&P: 59 y/o [place] Island[place] creole speaking F with PMH of HTN and type 2 diabetes (On Metformin) presents to the ED complaining of blurry vision in left eye and headcache.    CT Head 1/19/21: No evidence of acute intracranial hemorrhage, midline shift, hydrocephalus or signs of raised intracranial pressure.    Patient was received awake, alert and cooperative in EDU this AM. [employer] #[id number] (Antonio) was utilized for language translation given patient's primary language is [place] Island[place]-Creole. Patient was able to communicate basic wants/needs and follow simple directives for the purposes of today's assessment. The patient denies changes in swallowing and communication at this time. Recommendations discussed with primary RN in ESSU-1.

## 2021-01-20 NOTE — H&P ADULT - PROBLEM SELECTOR PLAN 2
Patient with SBP initially of 211 on arrival to ED.  Permissive HTN up to 220/110 for first 48-72 hours after sympton onset to be followed by gradual normotension.  Trying reaching to son in AM to find out BP medication patient is on.  DASH/TLC diet ordered.

## 2021-01-20 NOTE — PATIENT PROFILE ADULT - INTERPRETATION SERVICES DECLINED
PCA Logan on 7N used for interpretation and patient son Meeta Alexander/Patient/Caregiver requests family/friend to interpret.

## 2021-01-20 NOTE — SPEECH LANGUAGE PATHOLOGY EVALUATION - COMMENTS
Per H&P: 59 y/o [place] creole speaking F with PMH of HTN and type 2 diabetes (On Metformin) presents to the ED complaining of blurry vision in left eye and headcache.    CT Head 1/19/21: No evidence of acute intracranial hemorrhage, midline shift, hydrocephalus or signs of raised intracranial pressure.    Patient was received awake, alert and cooperative in EDU this AM. [employer] #[id number] (Antonio) was utilized for language translation given patient's primary language is [place]Creole. Patient was able to communicate basic wants/needs and follow simple directives for the purposes of today's assessment. The patient denies changes in swallowing and communication at this time. Recommendations discussed with primary RN in ESSU-1. Unremarkable

## 2021-01-20 NOTE — H&P ADULT - ATTENDING COMMENTS
Pt presenting with L eye blurry vision.  Noted to have L CN 6 palsy and L homonomous hemianopsia.     CN6 palsy remains at this time.    Labs and CT/CTA head reviewed  Neurology consult reviewed  Pt with likely CVA  Will check MRI, TTE  Monitor on tele

## 2021-01-20 NOTE — H&P ADULT - ASSESSMENT
61 y/o [place] creole speaking F with PMH of HTN and type 2 diabetes (On Metformin) presents to the ED complaining of blurry vision in left eye and headcache.

## 2021-01-21 DIAGNOSIS — E04.1 NONTOXIC SINGLE THYROID NODULE: ICD-10-CM

## 2021-01-21 PROBLEM — E11.9 TYPE 2 DIABETES MELLITUS WITHOUT COMPLICATIONS: Chronic | Status: ACTIVE | Noted: 2021-01-20

## 2021-01-21 PROBLEM — I10 ESSENTIAL (PRIMARY) HYPERTENSION: Chronic | Status: ACTIVE | Noted: 2021-01-20

## 2021-01-21 LAB
A1C WITH ESTIMATED AVERAGE GLUCOSE RESULT: 13.3 % — HIGH (ref 4–5.6)
ALBUMIN SERPL ELPH-MCNC: 3.7 G/DL — SIGNIFICANT CHANGE UP (ref 3.3–5)
ALP SERPL-CCNC: 147 U/L — HIGH (ref 40–120)
ALT FLD-CCNC: 12 U/L — SIGNIFICANT CHANGE UP (ref 4–33)
ANION GAP SERPL CALC-SCNC: 12 MMOL/L — SIGNIFICANT CHANGE UP (ref 7–14)
AST SERPL-CCNC: 16 U/L — SIGNIFICANT CHANGE UP (ref 4–32)
BILIRUB SERPL-MCNC: 0.3 MG/DL — SIGNIFICANT CHANGE UP (ref 0.2–1.2)
BUN SERPL-MCNC: 12 MG/DL — SIGNIFICANT CHANGE UP (ref 7–23)
CALCIUM SERPL-MCNC: 9.6 MG/DL — SIGNIFICANT CHANGE UP (ref 8.4–10.5)
CHLORIDE SERPL-SCNC: 104 MMOL/L — SIGNIFICANT CHANGE UP (ref 98–107)
CO2 SERPL-SCNC: 27 MMOL/L — SIGNIFICANT CHANGE UP (ref 22–31)
CREAT SERPL-MCNC: 0.7 MG/DL — SIGNIFICANT CHANGE UP (ref 0.5–1.3)
ESTIMATED AVERAGE GLUCOSE: 335 MG/DL — HIGH (ref 68–114)
GLUCOSE BLDC GLUCOMTR-MCNC: 140 MG/DL — HIGH (ref 70–99)
GLUCOSE BLDC GLUCOMTR-MCNC: 147 MG/DL — HIGH (ref 70–99)
GLUCOSE BLDC GLUCOMTR-MCNC: 184 MG/DL — HIGH (ref 70–99)
GLUCOSE BLDC GLUCOMTR-MCNC: 188 MG/DL — HIGH (ref 70–99)
GLUCOSE BLDC GLUCOMTR-MCNC: 232 MG/DL — HIGH (ref 70–99)
GLUCOSE SERPL-MCNC: 127 MG/DL — HIGH (ref 70–99)
HCT VFR BLD CALC: 47.4 % — HIGH (ref 34.5–45)
HCV AB S/CO SERPL IA: 0.15 S/CO — SIGNIFICANT CHANGE UP (ref 0–0.99)
HCV AB SERPL-IMP: SIGNIFICANT CHANGE UP
HGB BLD-MCNC: 14.7 G/DL — SIGNIFICANT CHANGE UP (ref 11.5–15.5)
MCHC RBC-ENTMCNC: 26.2 PG — LOW (ref 27–34)
MCHC RBC-ENTMCNC: 31 GM/DL — LOW (ref 32–36)
MCV RBC AUTO: 84.5 FL — SIGNIFICANT CHANGE UP (ref 80–100)
NRBC # BLD: 0 /100 WBCS — SIGNIFICANT CHANGE UP
NRBC # FLD: 0 K/UL — SIGNIFICANT CHANGE UP
PLATELET # BLD AUTO: 345 K/UL — SIGNIFICANT CHANGE UP (ref 150–400)
POTASSIUM SERPL-MCNC: 4.1 MMOL/L — SIGNIFICANT CHANGE UP (ref 3.5–5.3)
POTASSIUM SERPL-SCNC: 4.1 MMOL/L — SIGNIFICANT CHANGE UP (ref 3.5–5.3)
PROT SERPL-MCNC: 7 G/DL — SIGNIFICANT CHANGE UP (ref 6–8.3)
RBC # BLD: 5.61 M/UL — HIGH (ref 3.8–5.2)
RBC # FLD: 13.3 % — SIGNIFICANT CHANGE UP (ref 10.3–14.5)
SODIUM SERPL-SCNC: 143 MMOL/L — SIGNIFICANT CHANGE UP (ref 135–145)
T3 SERPL-MCNC: 136 NG/DL — SIGNIFICANT CHANGE UP (ref 80–200)
T4 FREE SERPL-MCNC: 1.3 NG/DL — SIGNIFICANT CHANGE UP (ref 0.9–1.8)
TSH SERPL-MCNC: <0.1 UIU/ML — LOW (ref 0.27–4.2)
WBC # BLD: 9.57 K/UL — SIGNIFICANT CHANGE UP (ref 3.8–10.5)
WBC # FLD AUTO: 9.57 K/UL — SIGNIFICANT CHANGE UP (ref 3.8–10.5)

## 2021-01-21 PROCEDURE — 93010 ELECTROCARDIOGRAM REPORT: CPT | Mod: 76

## 2021-01-21 PROCEDURE — 76536 US EXAM OF HEAD AND NECK: CPT | Mod: 26

## 2021-01-21 PROCEDURE — 99232 SBSQ HOSP IP/OBS MODERATE 35: CPT

## 2021-01-21 RX ORDER — ASPIRIN/CALCIUM CARB/MAGNESIUM 324 MG
81 TABLET ORAL DAILY
Refills: 0 | Status: DISCONTINUED | OUTPATIENT
Start: 2021-01-21 | End: 2021-01-25

## 2021-01-21 RX ORDER — ACETAMINOPHEN 500 MG
650 TABLET ORAL ONCE
Refills: 0 | Status: COMPLETED | OUTPATIENT
Start: 2021-01-21 | End: 2021-01-21

## 2021-01-21 RX ORDER — INSULIN LISPRO 100/ML
4 VIAL (ML) SUBCUTANEOUS
Refills: 0 | Status: DISCONTINUED | OUTPATIENT
Start: 2021-01-21 | End: 2021-01-22

## 2021-01-21 RX ORDER — ATORVASTATIN CALCIUM 80 MG/1
80 TABLET, FILM COATED ORAL AT BEDTIME
Refills: 0 | Status: DISCONTINUED | OUTPATIENT
Start: 2021-01-21 | End: 2021-01-25

## 2021-01-21 RX ORDER — INSULIN LISPRO 100/ML
4 VIAL (ML) SUBCUTANEOUS
Refills: 0 | Status: DISCONTINUED | OUTPATIENT
Start: 2021-01-22 | End: 2021-01-23

## 2021-01-21 RX ORDER — INSULIN LISPRO 100/ML
5 VIAL (ML) SUBCUTANEOUS
Refills: 0 | Status: DISCONTINUED | OUTPATIENT
Start: 2021-01-22 | End: 2021-01-25

## 2021-01-21 RX ADMIN — Medication 2: at 12:24

## 2021-01-21 RX ADMIN — HEPARIN SODIUM 5000 UNIT(S): 5000 INJECTION INTRAVENOUS; SUBCUTANEOUS at 17:25

## 2021-01-21 RX ADMIN — INSULIN GLARGINE 12 UNIT(S): 100 INJECTION, SOLUTION SUBCUTANEOUS at 21:42

## 2021-01-21 RX ADMIN — INSULIN GLARGINE 12 UNIT(S): 100 INJECTION, SOLUTION SUBCUTANEOUS at 02:20

## 2021-01-21 RX ADMIN — SODIUM CHLORIDE 3 MILLILITER(S): 9 INJECTION INTRAMUSCULAR; INTRAVENOUS; SUBCUTANEOUS at 21:02

## 2021-01-21 RX ADMIN — Medication 4 UNIT(S): at 12:29

## 2021-01-21 RX ADMIN — Medication 1: at 17:35

## 2021-01-21 RX ADMIN — Medication 4 UNIT(S): at 08:08

## 2021-01-21 RX ADMIN — HEPARIN SODIUM 5000 UNIT(S): 5000 INJECTION INTRAVENOUS; SUBCUTANEOUS at 04:19

## 2021-01-21 RX ADMIN — SODIUM CHLORIDE 3 MILLILITER(S): 9 INJECTION INTRAMUSCULAR; INTRAVENOUS; SUBCUTANEOUS at 02:21

## 2021-01-21 RX ADMIN — Medication 4 UNIT(S): at 17:26

## 2021-01-21 RX ADMIN — SODIUM CHLORIDE 3 MILLILITER(S): 9 INJECTION INTRAMUSCULAR; INTRAVENOUS; SUBCUTANEOUS at 12:29

## 2021-01-21 RX ADMIN — SODIUM CHLORIDE 3 MILLILITER(S): 9 INJECTION INTRAMUSCULAR; INTRAVENOUS; SUBCUTANEOUS at 04:07

## 2021-01-21 RX ADMIN — Medication 650 MILLIGRAM(S): at 08:08

## 2021-01-21 RX ADMIN — ATORVASTATIN CALCIUM 80 MILLIGRAM(S): 80 TABLET, FILM COATED ORAL at 21:42

## 2021-01-21 NOTE — DIETITIAN INITIAL EVALUATION ADULT. - OTHER INFO
Pt with history of type 2 diabetes, noncompliant with metformin/glipizide PTA, HbA1c 13.3% (1/21/21) indicating poor control. Pt occasionally checks fingersticks with values ~400s. Endocrinology following and adjusting insulin regimen as appropriate; BGs currently managed with therapeutic diet, basal/bolus/corrective sliding scale.     Provided pt/pt's son with written and verbal type 2 diabetes nutrition education. Topics discussed include: label reading, sources of carbohydrates, carbohydrate counting, complex vs simple carbohydrates, inclusion of whole grains/fiber,  MyPlate healthy eating guidelines. Discussed importance of self monitoring blood glucose and adherence to medication regimen; encouraged outpatient endocrinology follow up. Addressed all concerns as able, pt made aware RDN remains available.     Pt denies current GI distress (nausea/vomiting/constipation/diarrhea), last BM 1/20. Pt reports usual body weight ~142 lbs, dosing weight 138.8 (1/20). Pt s/p bedside swallow evaluation (1/20) recommending regular solids with thin liquids; in line with pt current diet order which she reports tolerating well.

## 2021-01-21 NOTE — DIETITIAN INITIAL EVALUATION ADULT. - ORAL INTAKE PTA/DIET HISTORY
Offered to provide  services, pt prefer son translate via telephone. Pt reports generally fair appetite/PO intake, consumes small portions. Pt lives at home with family, who assist with meal preparation. Pt monitors intake of "white rice, bread, sugars", occasionally consumes juice diluted with water, avoids soda. Pt with NKFA, no noted micronutrient supplementation PTA per H&P.

## 2021-01-21 NOTE — DIETITIAN INITIAL EVALUATION ADULT. - DIET TYPE
1) Recommend change diet to Consistent Carbohydrate with evening snack; may d/c DASH/TLC restriction.

## 2021-01-21 NOTE — DIETITIAN INITIAL EVALUATION ADULT. - PERTINENT MEDS FT
LANTUS 12 Unit(s) at bedtime, ADMELOG corrective regimen sliding scale, ADMELOG 4 Unit(s) three times a day before meals

## 2021-01-21 NOTE — DIETITIAN INITIAL EVALUATION ADULT. - ENERGY INTAKE
Pt reports fair PO intake in house, amenable to receiving nutrition supplement. Encouraged completion of daily menu.

## 2021-01-21 NOTE — DIETITIAN INITIAL EVALUATION ADULT. - ORAL NUTRITION SUPPLEMENTS
2) Recommend addition of 8 oz Glucerna 1 PO daily (provides 220 kcal, 10 gm protein) to assist pt in meeting estimated protein-energy needs.

## 2021-01-21 NOTE — DIETITIAN INITIAL EVALUATION ADULT. - REASON FOR ADMISSION
Per chart, pt is 60 year old [place] creole speaking female PMHx HTN, HLD, DM2, presenting with complaint of blurry vision and headache, found to have possible pontine CVA.

## 2021-01-21 NOTE — PROGRESS NOTE ADULT - SUBJECTIVE AND OBJECTIVE BOX
Patient is a 60y old  Female who presents with a chief complaint of Visual changes, hypertensive urgency. (21 Jan 2021 14:12)      SUBJECTIVE / OVERNIGHT EVENTS: Pt seen and examined at the bedside. Creole is her primary language however she understood some Serbian.    MEDICATIONS  (STANDING):  dextrose 40% Gel 15 Gram(s) Oral once  dextrose 5%. 1000 milliLiter(s) (50 mL/Hr) IV Continuous <Continuous>  dextrose 5%. 1000 milliLiter(s) (100 mL/Hr) IV Continuous <Continuous>  dextrose 50% Injectable 25 Gram(s) IV Push once  dextrose 50% Injectable 12.5 Gram(s) IV Push once  dextrose 50% Injectable 25 Gram(s) IV Push once  glucagon  Injectable 1 milliGRAM(s) IntraMuscular once  heparin   Injectable 5000 Unit(s) SubCutaneous every 12 hours  insulin glargine Injectable (LANTUS) 12 Unit(s) SubCutaneous at bedtime  insulin lispro (ADMELOG) corrective regimen sliding scale   SubCutaneous three times a day before meals  insulin lispro (ADMELOG) corrective regimen sliding scale   SubCutaneous at bedtime  insulin lispro Injectable (ADMELOG) 4 Unit(s) SubCutaneous before dinner  sodium chloride 0.9% lock flush 3 milliLiter(s) IV Push every 8 hours    MEDICATIONS  (PRN):      PHYSICAL EXAM:  Vital Signs Last 24 Hrs  T(C): 36.8 (21 Jan 2021 16:15), Max: 37 (20 Jan 2021 20:35)  T(F): 98.2 (21 Jan 2021 16:15), Max: 98.6 (20 Jan 2021 20:35)  HR: 98 (21 Jan 2021 16:15) (80 - 100)  BP: 152/84 (21 Jan 2021 16:15) (148/80 - 160/90)  BP(mean): --  RR: 17 (21 Jan 2021 16:15) (16 - 18)  SpO2: 98% (21 Jan 2021 16:15) (98% - 99%)    CONSTITUTIONAL: NAD, well-developed, well-groomed  RESPIRATORY: Normal respiratory effort; lungs are clear to auscultation bilaterally  CARDIOVASCULAR: Regular rate and rhythm, normal S1 and S2, no murmur/rub/gallop; No lower extremity edema  GASTROINTESTINAL: Nontender to palpation, normoactive bowel sounds, no rebound/guarding; No hepatosplenomegaly  NEUROLOGY: non-focal; no gross sensory deficits   PSYCH: A+O to person, place, and time; affect appropriate      LABS:                        14.7   9.57  )-----------( 345      ( 21 Jan 2021 07:53 )             47.4     01-21    143  |  104  |  12  ----------------------------<  127<H>  4.1   |  27  |  0.70    Ca    9.6      21 Jan 2021 07:53  Phos  3.5     01-20  Mg     1.7     01-20    TPro  7.0  /  Alb  3.7  /  TBili  0.3  /  DBili  x   /  AST  16  /  ALT  12  /  AlkPhos  147<H>  01-21    PT/INR - ( 19 Jan 2021 23:06 )   PT: 11.6 sec;   INR: 1.02 ratio         PTT - ( 19 Jan 2021 23:06 )  PTT:37.0 sec            RADIOLOGY & ADDITIONAL TESTS:  Results Reviewed: y  Imaging Personally Reviewed: y  Electrocardiogram Personally Reviewed: n    COORDINATION OF CARE:  Care Discussed with Consultants/Other Providers [Y/N]:  Prior or Outpatient Records Reviewed [Y/N]:

## 2021-01-21 NOTE — DIETITIAN INITIAL EVALUATION ADULT. - ADD RECOMMEND
3) Provided pt/pt son with written and verbal type 2 diabetes nutrition education, RDN remains available PRN. 4) Monitor PO intake, tolerance to diet/supplement, weight trends, nutrition related lab values, BMs/GI distress, skin integrity, hydration status.

## 2021-01-21 NOTE — DIETITIAN INITIAL EVALUATION ADULT. - PHYSCIAL ASSESSMENT
Pt with no overt signs of muscle wasting/fat loss upon visualization.  No pressure injuries noted at this time.  Normal BMI Classification.

## 2021-01-21 NOTE — PROGRESS NOTE ADULT - SUBJECTIVE AND OBJECTIVE BOX
Chief Complaint: f/u DM2    History:  Patient seen at bedside this morning, tolerating po, does not have nausea, vomiting, abdominal pain. She reports severe diarrhea previously on Metformin. Spoke with her using [facility]  ID# [id number].    MEDICATIONS  (STANDING):  dextrose 40% Gel 15 Gram(s) Oral once  dextrose 5%. 1000 milliLiter(s) (50 mL/Hr) IV Continuous <Continuous>  dextrose 5%. 1000 milliLiter(s) (100 mL/Hr) IV Continuous <Continuous>  dextrose 50% Injectable 25 Gram(s) IV Push once  dextrose 50% Injectable 12.5 Gram(s) IV Push once  dextrose 50% Injectable 25 Gram(s) IV Push once  glucagon  Injectable 1 milliGRAM(s) IntraMuscular once  heparin   Injectable 5000 Unit(s) SubCutaneous every 12 hours  insulin glargine Injectable (LANTUS) 12 Unit(s) SubCutaneous at bedtime  insulin lispro (ADMELOG) corrective regimen sliding scale   SubCutaneous three times a day before meals  insulin lispro (ADMELOG) corrective regimen sliding scale   SubCutaneous at bedtime  insulin lispro Injectable (ADMELOG) 4 Unit(s) SubCutaneous three times a day before meals  sodium chloride 0.9% lock flush 3 milliLiter(s) IV Push every 8 hours    MEDICATIONS  (PRN):    Allergies  No Known Allergies    Review of Systems:  ALL OTHER SYSTEMS REVIEWED AND NEGATIVE    PHYSICAL EXAM:  VITALS: T(C): 37 (01-21-21 @ 12:15)  T(F): 98.6 (01-21-21 @ 12:15), Max: 98.8 (01-20-21 @ 17:43)  HR: 100 (01-21-21 @ 12:15) (80 - 100)  BP: 156/86 (01-21-21 @ 12:15) (148/80 - 176/92)  RR:  (16 - 18)  SpO2:  (98% - 99%)  Wt(kg): --  GENERAL: NAD, well-developed  EYES: No proptosis, anicteric  HEENT:  Atraumatic, Normocephalic  RESPIRATORY: + air movement bilaterally, no respiratory distress   PSYCH: Alert and oriented x 3, reactive affect     POCT Blood Glucose.: 232 mg/dL (01-21-21 @ 11:55) A 4, A 2  POCT Blood Glucose.: 140 mg/dL (01-21-21 @ 07:59) A 4   POCT Blood Glucose.: 147 mg/dL (01-21-21 @ 02:14) L 12   POCT Blood Glucose.: 131 mg/dL (01-20-21 @ 18:03) A 4  POCT Blood Glucose.: 224 mg/dL (01-20-21 @ 12:45) A 4, A 2   POCT Blood Glucose.: 243 mg/dL (01-20-21 @ 08:34)  POCT Blood Glucose.: 274 mg/dL (01-20-21 @ 02:18)  POCT Blood Glucose.: 237 mg/dL (01-19-21 @ 21:59)      01-21    143  |  104  |  12  ----------------------------<  127<H>  4.1   |  27  |  0.70    EGFR if : 108  EGFR if non : 94    Ca    9.6      01-21  Mg     1.7     01-20  Phos  3.5     01-20    TPro  7.0  /  Alb  3.7  /  TBili  0.3  /  DBili  x   /  AST  16  /  ALT  12  /  AlkPhos  147<H>  01-21          Thyroid Function Tests:  01-21 @ 07:53 TSH <0.10 FreeT4 1.3 T3 136 Anti TPO -- Anti Thyroglobulin Ab -- TSI --  01-20 @ 06:26 TSH <0.10 FreeT4 -- T3 -- Anti TPO -- Anti Thyroglobulin Ab -- TSI --

## 2021-01-22 ENCOUNTER — TRANSCRIPTION ENCOUNTER (OUTPATIENT)
Age: 61
End: 2021-01-22

## 2021-01-22 LAB
ANION GAP SERPL CALC-SCNC: 11 MMOL/L — SIGNIFICANT CHANGE UP (ref 7–14)
BUN SERPL-MCNC: 18 MG/DL — SIGNIFICANT CHANGE UP (ref 7–23)
CALCIUM SERPL-MCNC: 9.3 MG/DL — SIGNIFICANT CHANGE UP (ref 8.4–10.5)
CHLORIDE SERPL-SCNC: 105 MMOL/L — SIGNIFICANT CHANGE UP (ref 98–107)
CO2 SERPL-SCNC: 24 MMOL/L — SIGNIFICANT CHANGE UP (ref 22–31)
CREAT SERPL-MCNC: 0.69 MG/DL — SIGNIFICANT CHANGE UP (ref 0.5–1.3)
GLUCOSE BLDC GLUCOMTR-MCNC: 161 MG/DL — HIGH (ref 70–99)
GLUCOSE BLDC GLUCOMTR-MCNC: 171 MG/DL — HIGH (ref 70–99)
GLUCOSE BLDC GLUCOMTR-MCNC: 201 MG/DL — HIGH (ref 70–99)
GLUCOSE BLDC GLUCOMTR-MCNC: 226 MG/DL — HIGH (ref 70–99)
GLUCOSE SERPL-MCNC: 192 MG/DL — HIGH (ref 70–99)
HCT VFR BLD CALC: 45.9 % — HIGH (ref 34.5–45)
HGB BLD-MCNC: 14 G/DL — SIGNIFICANT CHANGE UP (ref 11.5–15.5)
MCHC RBC-ENTMCNC: 25.7 PG — LOW (ref 27–34)
MCHC RBC-ENTMCNC: 30.5 GM/DL — LOW (ref 32–36)
MCV RBC AUTO: 84.2 FL — SIGNIFICANT CHANGE UP (ref 80–100)
NRBC # BLD: 0 /100 WBCS — SIGNIFICANT CHANGE UP
NRBC # FLD: 0 K/UL — SIGNIFICANT CHANGE UP
PLATELET # BLD AUTO: 354 K/UL — SIGNIFICANT CHANGE UP (ref 150–400)
POTASSIUM SERPL-MCNC: 3.8 MMOL/L — SIGNIFICANT CHANGE UP (ref 3.5–5.3)
POTASSIUM SERPL-SCNC: 3.8 MMOL/L — SIGNIFICANT CHANGE UP (ref 3.5–5.3)
RBC # BLD: 5.45 M/UL — HIGH (ref 3.8–5.2)
RBC # FLD: 13.3 % — SIGNIFICANT CHANGE UP (ref 10.3–14.5)
SODIUM SERPL-SCNC: 140 MMOL/L — SIGNIFICANT CHANGE UP (ref 135–145)
WBC # BLD: 10.28 K/UL — SIGNIFICANT CHANGE UP (ref 3.8–10.5)
WBC # FLD AUTO: 10.28 K/UL — SIGNIFICANT CHANGE UP (ref 3.8–10.5)

## 2021-01-22 PROCEDURE — 99233 SBSQ HOSP IP/OBS HIGH 50: CPT

## 2021-01-22 PROCEDURE — 99232 SBSQ HOSP IP/OBS MODERATE 35: CPT

## 2021-01-22 RX ORDER — LISINOPRIL 2.5 MG/1
10 TABLET ORAL DAILY
Refills: 0 | Status: DISCONTINUED | OUTPATIENT
Start: 2021-01-22 | End: 2021-01-22

## 2021-01-22 RX ORDER — INSULIN LISPRO 100/ML
5 VIAL (ML) SUBCUTANEOUS
Refills: 0 | Status: DISCONTINUED | OUTPATIENT
Start: 2021-01-22 | End: 2021-01-23

## 2021-01-22 RX ORDER — LISINOPRIL 2.5 MG/1
10 TABLET ORAL DAILY
Refills: 0 | Status: DISCONTINUED | OUTPATIENT
Start: 2021-01-22 | End: 2021-01-25

## 2021-01-22 RX ORDER — REPAGLINIDE 1 MG/1
1 TABLET ORAL
Qty: 270 | Refills: 0
Start: 2021-01-22 | End: 2021-04-21

## 2021-01-22 RX ORDER — INSULIN GLARGINE 100 [IU]/ML
12 INJECTION, SOLUTION SUBCUTANEOUS
Qty: 5 | Refills: 0
Start: 2021-01-22

## 2021-01-22 RX ORDER — REPAGLINIDE 1 MG/1
1 TABLET ORAL
Qty: 90 | Refills: 0
Start: 2021-01-22 | End: 2021-02-20

## 2021-01-22 RX ORDER — AMITRIPTYLINE HCL 25 MG
1 TABLET ORAL
Qty: 0 | Refills: 0 | DISCHARGE

## 2021-01-22 RX ORDER — AMLODIPINE BESYLATE 2.5 MG/1
1 TABLET ORAL
Qty: 0 | Refills: 0 | DISCHARGE

## 2021-01-22 RX ORDER — METFORMIN HYDROCHLORIDE 850 MG/1
1 TABLET ORAL
Qty: 0 | Refills: 0 | DISCHARGE

## 2021-01-22 RX ADMIN — SODIUM CHLORIDE 3 MILLILITER(S): 9 INJECTION INTRAMUSCULAR; INTRAVENOUS; SUBCUTANEOUS at 12:21

## 2021-01-22 RX ADMIN — Medication 81 MILLIGRAM(S): at 12:29

## 2021-01-22 RX ADMIN — SODIUM CHLORIDE 3 MILLILITER(S): 9 INJECTION INTRAMUSCULAR; INTRAVENOUS; SUBCUTANEOUS at 20:09

## 2021-01-22 RX ADMIN — Medication 5 UNIT(S): at 17:40

## 2021-01-22 RX ADMIN — Medication 4 UNIT(S): at 12:30

## 2021-01-22 RX ADMIN — Medication 5 UNIT(S): at 08:07

## 2021-01-22 RX ADMIN — Medication 1: at 08:07

## 2021-01-22 RX ADMIN — INSULIN GLARGINE 12 UNIT(S): 100 INJECTION, SOLUTION SUBCUTANEOUS at 22:15

## 2021-01-22 RX ADMIN — HEPARIN SODIUM 5000 UNIT(S): 5000 INJECTION INTRAVENOUS; SUBCUTANEOUS at 17:41

## 2021-01-22 RX ADMIN — ATORVASTATIN CALCIUM 80 MILLIGRAM(S): 80 TABLET, FILM COATED ORAL at 20:08

## 2021-01-22 RX ADMIN — SODIUM CHLORIDE 3 MILLILITER(S): 9 INJECTION INTRAMUSCULAR; INTRAVENOUS; SUBCUTANEOUS at 05:33

## 2021-01-22 RX ADMIN — Medication 2: at 17:40

## 2021-01-22 RX ADMIN — HEPARIN SODIUM 5000 UNIT(S): 5000 INJECTION INTRAVENOUS; SUBCUTANEOUS at 05:33

## 2021-01-22 RX ADMIN — Medication 1: at 12:30

## 2021-01-22 NOTE — PHARMACOTHERAPY INTERVENTION NOTE - COMMENTS
Insurance now active in EMR. Discussed with endocrine MD - plan now basal insulin PEN qHS + Prandin before meals. RABBL  # [id number] used to reinforce diabetes education with patient. Patient could not perform return demonstration well due to visual changes. Pt able to verbalize understanding for sources of carbs, healthy plate, and hypoglycemia recognition & treatment. Counseled pt on where insulin should be injected (abdomen), rotating injection site, proper insulin storage, and sharps disposal - her daughter will mainly be in charge of her diabetes. S/w pt's daughter-in-law over the phone (Eileen) - she will administer long-acting insulin/Prandin (before meals only) and help check BG at least BID. She is an RN and understands how to administer insulin. [place] [facility] [place]
Marisol Calderon  # [id number]. Originally, pt instructed on 70/30 mixed insulin, mixed peaks and risks of hypoglycemia. Pt instructed on need to mix insulin vial (gently roll 10x) - pt could not draw up insulin in syringe (cannot see) - her daughter-in-law at home will help with insulin. Pt called her daughter-in-law, Eileen, who said she is an RN and will help administer insulin. Per Altagraciayoselyn, pt now has [facility] Medicaid (waiting on confirmation). If she has insurance, she knows how to administer basal/bolus insulin pens. If she does not have insurance, plan is 70/30 vial/syringe. Pt educated on A1c, goal A1c, S&S hyperglycemia, hypoglycemia and treatment, healthy plate, sources of carbs, goal BG, and when to check BG, pt could not draw up insulin via syringe, but was able to count clicks for insulin pen (still needs reinforcement as she did not hold for 10 seconds and forgot to do 2 unit test dose). Pt encouraged for outpt f/u with medicine and endocrine - lives near hospital, agreeable to [facility]. Additionally in the past, metformin caused excessive diarrhea after every dose. Her last MD was in [place] so she has not been back since she lives in [place].

## 2021-01-22 NOTE — PROGRESS NOTE ADULT - ATTENDING COMMENTS
Aj Jovel MD   Pager # [phone]  On evenings and weekends, please call the office at [phone] or page endocrine fellow on call. Please note that this patient may be followed by different provider tomorrow. If no answer, contact the office.
Aj Jovel MD   Pager # [phone]  On evenings and weekends, please call the office at [phone] or page endocrine fellow on call. Please note that this patient may be followed by different provider tomorrow. If no answer, contact the office.

## 2021-01-22 NOTE — DISCHARGE NOTE PROVIDER - NSDCFUSCHEDAPPT_GEN_ALL_CORE_FT
DEJON QUEZADA ; 02/03/2021 ; [facility] Intmed OP [id number] [address]DEJON Weir ; 04/20/2021 ; [facility] Endocrin OP [id number] [place] [address]

## 2021-01-22 NOTE — DISCHARGE NOTE PROVIDER - NSDCMRMEDTOKEN_GEN_ALL_CORE_FT
amitriptyline 25 mg oral tablet: 1 tab(s) orally once a day (at bedtime)  Basaglar KwikPen 100 units/mL subcutaneous solution: 12 unit(s) subcutaneous once a day (at bedtime)    INSURANCE CHECK   38097   glipiZIDE 10 mg oral tablet: 1 tab(s) orally once a day  MetFORMIN (Eqv-Fortamet) 1000 mg oral tablet, extended release: 1 tab(s) orally 2 times a day  Norvasc 5 mg oral tablet: 1 tab(s) orally once a day   Delmis Strickland 100 units/mL subcutaneous solution: 12 unit(s) subcutaneous once a day (at bedtime)    INSURANCE CHECK   96859    Alcohol Pads: Check BG 4x daily (pre-meals and at bedtime)  Aspirin Low Strength 81 mg oral delayed release tablet: 1 tab(s) orally once a day   atorvastatin 80 mg oral tablet: 1 tab(s) orally once a day (at bedtime)  Basaglar KwikPen 100 units/mL subcutaneous solution: 12 unit(s) subcutaneous once a day (at bedtime)    INSURANCE CHECK   55521   Glucometer: Dispense 1 device   Insulin Pen Needles : Use with insulin pen daily   Lancets : Check BG 4x daily (pre-meals and at bedtime)  lisinopril 10 mg oral tablet: 1 tab(s) orally once a day  Prandin 2 mg oral tablet: 1 tab(s) orally 3 times a day (before meals)   Test Strips : Check BG 4x daily (pre-meals and at bedtime)   Alcohol Pads: Check BG 4x daily (pre-meals and at bedtime)  Aspirin Low Strength 81 mg oral delayed release tablet: 1 tab(s) orally once a day   atorvastatin 80 mg oral tablet: 1 tab(s) orally once a day (at bedtime)  Basaglar KwikPen 100 units/mL subcutaneous solution: 12 unit(s) subcutaneous once a day (at bedtime)    INSURANCE CHECK   81976   Glucometer: Dispense 1 device   Insulin Pen Needles : Use with insulin pen daily   Lancets : Check BG 4x daily (pre-meals and at bedtime)  lisinopril 10 mg oral tablet: 1 tab(s) orally once a day  metoprolol succinate 25 mg oral tablet, extended release: 1 tab(s) orally once a day  Prandin 2 mg oral tablet: 1 tab(s) orally 3 times a day (before meals)   Test Strips : Check BG 4x daily (pre-meals and at bedtime)   Alcohol Pads: Check BG 4x daily (pre-meals and at bedtime)  Aspirin Low Strength 81 mg oral delayed release tablet: 1 tab(s) orally once a day   atorvastatin 80 mg oral tablet: 1 tab(s) orally once a day (at bedtime)  Basaglar KwikPen 100 units/mL subcutaneous solution: 12 unit(s) subcutaneous once a day (at bedtime)    INSURANCE CHECK   [id number]   Glucometer: Dispense 1 device   Insulin Pen Needles : Use with insulin pen daily   Lancets : Check BG 4x daily (pre-meals and at bedtime)  lisinopril 20 mg oral tablet: 1 tab(s) orally once a day  Metoprolol Succinate ER 50 mg oral tablet, extended release: 1 tab(s) orally once a day  Prandin 2 mg oral tablet: 1 tab(s) orally 3 times a day (before meals)   Test Strips : Check BG 4x daily (pre-meals and at bedtime)

## 2021-01-22 NOTE — DISCHARGE NOTE PROVIDER - NSDCCPCAREPLAN_GEN_ALL_CORE_FT
PRINCIPAL DISCHARGE DIAGNOSIS  Diagnosis: Visual changes  Assessment and Plan of Treatment: Improved. MRI head was negative for acute changes.      SECONDARY DISCHARGE DIAGNOSES  Diagnosis: Thyroid nodule  Assessment and Plan of Treatment: - she requires repeat TFTs as outpatient and NM uptake and scan prior to consideration of biopsy of any of the nodules.    Diagnosis: Hyperlipidemia, unspecified hyperlipidemia type  Assessment and Plan of Treatment: Continue cholesterol control medications. Continue DASH diet. Follow up with your PCP within 1 week of discharge for further management and monitoring of lipid and cholesterol panels.      Diagnosis: Low TSH level  Assessment and Plan of Treatment: Low TSH level    Diagnosis: Uncontrolled type 2 diabetes mellitus with hyperglycemia  Assessment and Plan of Treatment: A1c 13.5. You were seen by Endocrinology in-hospital. Continue with insulin and Prandin as recommended.    Diagnosis: Hypertensive urgency  Assessment and Plan of Treatment: Improved.     PRINCIPAL DISCHARGE DIAGNOSIS  Diagnosis: Visual changes  Assessment and Plan of Treatment: Improved. MRI head was negative for acute changes. Please  with Stroke Neurology within 1 week:  Tamika Marc NP at [address] [facility], [address] [place]  [phone]        SECONDARY DISCHARGE DIAGNOSES  Diagnosis: PFO (patent foramen ovale)  Assessment and Plan of Treatment: Your echocardiogram showed that you have a patent foramen ovale. You will need to take a baby aspirin and lipitor as prescribed. You will also need to follow up with  a cardiologist for routine monitoring.    Diagnosis: Uncontrolled type 2 diabetes mellitus with hyperglycemia  Assessment and Plan of Treatment: A1c 13.5. You were seen by Endocrinology in-hospital. Continue with insulin and Prandin as recommended.  Monitor finger sticks pre-meal and bedtime, low salt, fat and carbohydrate diet, minimize glucose intake.  Exercise daily for at least 30 minutes and weight loss.  Follow up with primary care physician and endocrinologist for routine Hemoglobin A1C checks and management.  Follow up with your ophthalmologist for routine yearly vision exams.   You can follow up in the [facility] endocrine clinic on discharge, on 4/20/2021 at 9 am.       Diagnosis: Hyperlipidemia, unspecified hyperlipidemia type  Assessment and Plan of Treatment: Continue cholesterol control medications. Continue DASH diet. Follow up with your PCP within 1 week of discharge for further management and monitoring of lipid and cholesterol panels.      Diagnosis: Low TSH level  Assessment and Plan of Treatment: Low TSH level    Diagnosis: Thyroid nodule  Assessment and Plan of Treatment: - she requires repeat TFTs as outpatient and NM uptake and scan prior to consideration of biopsy of any of the nodules.    Diagnosis: Hypertensive urgency  Assessment and Plan of Treatment: Improved.     PRINCIPAL DISCHARGE DIAGNOSIS  Diagnosis: Visual changes  Assessment and Plan of Treatment: Improved. MRI head was negative for acute changes. Please  with Stroke Neurology within 1 week:  Tamika Marc NP at [address] [facility], [address] [place]  [phone]        SECONDARY DISCHARGE DIAGNOSES  Diagnosis: Hypertension  Assessment and Plan of Treatment: Your blood pressure was found to be very high in the hospital and you were started on lisinopril and metoprolol. It is important that you monitnor your blood pressure and follow up with your PCP routinely.    Diagnosis: PFO (patent foramen ovale)  Assessment and Plan of Treatment: Your echocardiogram showed that you have a patent foramen ovale. You will need to take a baby aspirin and lipitor as prescribed. You will also need to follow up with  a cardiologist for routine monitoring.    Diagnosis: Uncontrolled type 2 diabetes mellitus with hyperglycemia  Assessment and Plan of Treatment: A1c 13.5. You were seen by Endocrinology in-hospital. Continue with insulin and Prandin as recommended.  Monitor finger sticks pre-meal and bedtime, low salt, fat and carbohydrate diet, minimize glucose intake.  Exercise daily for at least 30 minutes and weight loss.  Follow up with primary care physician and endocrinologist for routine Hemoglobin A1C checks and management.  Follow up with your ophthalmologist for routine yearly vision exams.   You can follow up in the [facility] endocrine clinic on discharge, on 4/20/2021 at 9 am.       Diagnosis: Hyperlipidemia, unspecified hyperlipidemia type  Assessment and Plan of Treatment: Continue cholesterol control medications. Continue DASH diet. Follow up with your PCP within 1 week of discharge for further management and monitoring of lipid and cholesterol panels.      Diagnosis: Low TSH level  Assessment and Plan of Treatment: Low TSH level    Diagnosis: Thyroid nodule  Assessment and Plan of Treatment: - she requires repeat TFTs as outpatient and NM uptake and scan prior to consideration of biopsy of any of the nodules.    Diagnosis: Hypertensive urgency  Assessment and Plan of Treatment: Improved.

## 2021-01-22 NOTE — DISCHARGE NOTE PROVIDER - HOSPITAL COURSE
61 y/o Scottish creole speaking F with PMH of HTN and type 2 diabetes (On Metformin) presents to the ED complaining of blurry vision in left eye and headache.  Patient was found to be hypertensive to 211 sbp. Lisinopril was started.   Patient was also found to have uncontrolled DM type II and was started on a basal/bolus insulin regimen. CT head (1/19/2021) to my eye was unremarkable.  CTA neck (1/19/2021) to my eye showed minimal plaque in the proximal right ICA.  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 MR head with no evidence acute hemorrhage, mass or mass effect. Echo revealed a PFO.  Pt was started on aspirin, and high dose statin. Pt's blood pressure is now better controlled. Educated pt on importance of PCP follow up and importance of glucose and blood pressure control. Pt was taught how to administer insulin and will be discharged on an oral regimen and basal insulin. Pt is now medically cleared for discharge home.

## 2021-01-22 NOTE — PROGRESS NOTE ADULT - SUBJECTIVE AND OBJECTIVE BOX
Patient is a 60y old  Female who presents with a chief complaint of Visual changes, hypertensive urgency. (22 Jan 2021 14:55)      SUBJECTIVE / OVERNIGHT EVENTS: Pt examined at the bedside with her son noted to be present via a mobile cell device. Pt reports that she feels much better and had concerns regarding her medications. It was explained that key adjustments were made due to her poor control of DM2 + her BP that would benefit her moving forward.    MEDICATIONS  (STANDING):  aspirin  chewable 81 milliGRAM(s) Oral daily  atorvastatin 80 milliGRAM(s) Oral at bedtime  dextrose 40% Gel 15 Gram(s) Oral once  dextrose 5%. 1000 milliLiter(s) (50 mL/Hr) IV Continuous <Continuous>  dextrose 5%. 1000 milliLiter(s) (100 mL/Hr) IV Continuous <Continuous>  dextrose 50% Injectable 25 Gram(s) IV Push once  dextrose 50% Injectable 12.5 Gram(s) IV Push once  dextrose 50% Injectable 25 Gram(s) IV Push once  glucagon  Injectable 1 milliGRAM(s) IntraMuscular once  heparin   Injectable 5000 Unit(s) SubCutaneous every 12 hours  insulin glargine Injectable (LANTUS) 12 Unit(s) SubCutaneous at bedtime  insulin lispro (ADMELOG) corrective regimen sliding scale   SubCutaneous three times a day before meals  insulin lispro (ADMELOG) corrective regimen sliding scale   SubCutaneous at bedtime  insulin lispro Injectable (ADMELOG) 5 Unit(s) SubCutaneous before dinner  insulin lispro Injectable (ADMELOG) 5 Unit(s) SubCutaneous before breakfast  insulin lispro Injectable (ADMELOG) 4 Unit(s) SubCutaneous before lunch  sodium chloride 0.9% lock flush 3 milliLiter(s) IV Push every 8 hours    MEDICATIONS  (PRN):      PHYSICAL EXAM:  Vital Signs Last 24 Hrs  T(C): 36.9 (22 Jan 2021 12:17), Max: 36.9 (22 Jan 2021 05:00)  T(F): 98.4 (22 Jan 2021 12:17), Max: 98.4 (22 Jan 2021 05:00)  HR: 86 (22 Jan 2021 12:17) (80 - 98)  BP: 141/65 (22 Jan 2021 12:17) (132/63 - 177/93)  BP(mean): --  RR: 18 (22 Jan 2021 12:17) (17 - 18)  SpO2: 98% (22 Jan 2021 12:17) (98% - 99%)    CONSTITUTIONAL: NAD, well-developed, well-groomed  RESPIRATORY: Normal respiratory effort; lungs are clear to auscultation bilaterally  CARDIOVASCULAR: Regular rate and rhythm, normal S1 and S2, no murmur/rub/gallop; No lower extremity edema  GASTROINTESTINAL: Nontender to palpation, normoactive bowel sounds, no rebound/guarding; No hepatosplenomegaly  NEUROLOGY: non-focal; no gross sensory deficits   PSYCH: A+O to person, place, and time; affect appropriate        LABS:                        14.0   10.28 )-----------( 354      ( 22 Jan 2021 07:08 )             45.9     01-22    140  |  105  |  18  ----------------------------<  192<H>  3.8   |  24  |  0.69    Ca    9.3      22 Jan 2021 07:08    TPro  7.0  /  Alb  3.7  /  TBili  0.3  /  DBili  x   /  AST  16  /  ALT  12  /  AlkPhos  147<H>  01-21                RADIOLOGY & ADDITIONAL TESTS:  Results Reviewed: y  Imaging Personally Reviewed: y  Electrocardiogram Personally Reviewed: n    COORDINATION OF CARE:  Care Discussed with Consultants/Other Providers [Y/N]:  Prior or Outpatient Records Reviewed [Y/N]:

## 2021-01-22 NOTE — DISCHARGE NOTE PROVIDER - PROVIDER TOKENS
FREE:[LAST:[[facility] medicine [facility]],PHONE:[phone]],FAX:[(   )    -]],FREE:[LAST:[[facility] cardiology clinic],PHONE:[phone]],FAX:[(   )    -]]

## 2021-01-22 NOTE — DISCHARGE NOTE PROVIDER - CARE PROVIDER_API CALL
[facility] medicine clinic,   Phone: [phone]  Fax: (   )    -  Follow Up Time:     [facility] cardiology clinic,   Phone: [phone]  Fax: (   )    -  Follow Up Time:

## 2021-01-22 NOTE — PROGRESS NOTE ADULT - SUBJECTIVE AND OBJECTIVE BOX
Chief Complaint: f/u DM2 and low TSH    History:  Patient seen at bedside this afternoon, does not have abdominal pain, nausea, vomiting. Thyroid ultrasound revealed dominant 1.1 cm nodule in the right and 4.0 cm nodule in the left.     MEDICATIONS  (STANDING):  aspirin  chewable 81 milliGRAM(s) Oral daily  atorvastatin 80 milliGRAM(s) Oral at bedtime  dextrose 40% Gel 15 Gram(s) Oral once  dextrose 5%. 1000 milliLiter(s) (50 mL/Hr) IV Continuous <Continuous>  dextrose 5%. 1000 milliLiter(s) (100 mL/Hr) IV Continuous <Continuous>  dextrose 50% Injectable 25 Gram(s) IV Push once  dextrose 50% Injectable 12.5 Gram(s) IV Push once  dextrose 50% Injectable 25 Gram(s) IV Push once  glucagon  Injectable 1 milliGRAM(s) IntraMuscular once  heparin   Injectable 5000 Unit(s) SubCutaneous every 12 hours  insulin glargine Injectable (LANTUS) 12 Unit(s) SubCutaneous at bedtime  insulin lispro (ADMELOG) corrective regimen sliding scale   SubCutaneous three times a day before meals  insulin lispro (ADMELOG) corrective regimen sliding scale   SubCutaneous at bedtime  insulin lispro Injectable (ADMELOG) 5 Unit(s) SubCutaneous before breakfast  insulin lispro Injectable (ADMELOG) 4 Unit(s) SubCutaneous before lunch  insulin lispro Injectable (ADMELOG) 4 Unit(s) SubCutaneous before dinner  sodium chloride 0.9% lock flush 3 milliLiter(s) IV Push every 8 hours    MEDICATIONS  (PRN):    Allergies  No Known Allergies    Review of Systems:  ALL OTHER SYSTEMS REVIEWED AND NEGATIVE    PHYSICAL EXAM:  VITALS: T(C): 36.9 (01-22-21 @ 12:17)  T(F): 98.4 (01-22-21 @ 12:17), Max: 98.4 (01-22-21 @ 05:00)  HR: 86 (01-22-21 @ 12:17) (80 - 98)  BP: 141/65 (01-22-21 @ 12:17) (132/63 - 177/93)  RR:  (17 - 18)  SpO2:  (98% - 99%)  Wt(kg): --  GENERAL: NAD, well-developed  EYES: No proptosis, anicteric  HEENT:  Atraumatic, Normocephalic  RESPIRATORY: + air movement bilaterally, no respiratory distress   PSYCH: Alert and oriented x 3, reactive affect, euthymic mood     POCT Blood Glucose.: 161 mg/dL (01-22-21 @ 11:52) A 4, A 1  POCT Blood Glucose.: 171 mg/dL (01-22-21 @ 07:37) A 5, A 1  POCT Blood Glucose.: 184 mg/dL (01-21-21 @ 21:17)  POCT Blood Glucose.: 188 mg/dL (01-21-21 @ 17:30) A 4, A 1   POCT Blood Glucose.: 232 mg/dL (01-21-21 @ 11:55)  POCT Blood Glucose.: 140 mg/dL (01-21-21 @ 07:59)  POCT Blood Glucose.: 147 mg/dL (01-21-21 @ 02:14)  POCT Blood Glucose.: 131 mg/dL (01-20-21 @ 18:03)  POCT Blood Glucose.: 224 mg/dL (01-20-21 @ 12:45)  POCT Blood Glucose.: 243 mg/dL (01-20-21 @ 08:34)  POCT Blood Glucose.: 274 mg/dL (01-20-21 @ 02:18)  POCT Blood Glucose.: 237 mg/dL (01-19-21 @ 21:59)      01-22    140  |  105  |  18  ----------------------------<  192<H>  3.8   |  24  |  0.69    EGFR if : 109  EGFR if non : 94    Ca    9.3      01-22  Mg     1.7     01-20  Phos  3.5     01-20    TPro  7.0  /  Alb  3.7  /  TBili  0.3  /  DBili  x   /  AST  16  /  ALT  12  /  AlkPhos  147<H>  01-21          Thyroid Function Tests:  01-21 @ 07:53 TSH <0.10 FreeT4 1.3 T3 136 Anti TPO -- Anti Thyroglobulin Ab -- TSI --  01-20 @ 06:26 TSH <0.10 FreeT4 -- T3 -- Anti TPO -- Anti Thyroglobulin Ab -- TSI --

## 2021-01-23 LAB
ANION GAP SERPL CALC-SCNC: 12 MMOL/L — SIGNIFICANT CHANGE UP (ref 7–14)
BUN SERPL-MCNC: 16 MG/DL — SIGNIFICANT CHANGE UP (ref 7–23)
CALCIUM SERPL-MCNC: 8.8 MG/DL — SIGNIFICANT CHANGE UP (ref 8.4–10.5)
CHLORIDE SERPL-SCNC: 106 MMOL/L — SIGNIFICANT CHANGE UP (ref 98–107)
CO2 SERPL-SCNC: 24 MMOL/L — SIGNIFICANT CHANGE UP (ref 22–31)
CREAT SERPL-MCNC: 0.7 MG/DL — SIGNIFICANT CHANGE UP (ref 0.5–1.3)
GLUCOSE BLDC GLUCOMTR-MCNC: 142 MG/DL — HIGH (ref 70–99)
GLUCOSE BLDC GLUCOMTR-MCNC: 158 MG/DL — HIGH (ref 70–99)
GLUCOSE BLDC GLUCOMTR-MCNC: 215 MG/DL — HIGH (ref 70–99)
GLUCOSE BLDC GLUCOMTR-MCNC: 219 MG/DL — HIGH (ref 70–99)
GLUCOSE SERPL-MCNC: 155 MG/DL — HIGH (ref 70–99)
HCT VFR BLD CALC: 42.9 % — SIGNIFICANT CHANGE UP (ref 34.5–45)
HGB BLD-MCNC: 13 G/DL — SIGNIFICANT CHANGE UP (ref 11.5–15.5)
MCHC RBC-ENTMCNC: 26.2 PG — LOW (ref 27–34)
MCHC RBC-ENTMCNC: 30.3 GM/DL — LOW (ref 32–36)
MCV RBC AUTO: 86.3 FL — SIGNIFICANT CHANGE UP (ref 80–100)
NRBC # BLD: 0 /100 WBCS — SIGNIFICANT CHANGE UP
NRBC # FLD: 0 K/UL — SIGNIFICANT CHANGE UP
PLATELET # BLD AUTO: 346 K/UL — SIGNIFICANT CHANGE UP (ref 150–400)
POTASSIUM SERPL-MCNC: 3.9 MMOL/L — SIGNIFICANT CHANGE UP (ref 3.5–5.3)
POTASSIUM SERPL-SCNC: 3.9 MMOL/L — SIGNIFICANT CHANGE UP (ref 3.5–5.3)
RBC # BLD: 4.97 M/UL — SIGNIFICANT CHANGE UP (ref 3.8–5.2)
RBC # FLD: 13.3 % — SIGNIFICANT CHANGE UP (ref 10.3–14.5)
SODIUM SERPL-SCNC: 142 MMOL/L — SIGNIFICANT CHANGE UP (ref 135–145)
TSI ACT/NOR SER: <0.1 IU/L — SIGNIFICANT CHANGE UP (ref 0–0.55)
WBC # BLD: 9.3 K/UL — SIGNIFICANT CHANGE UP (ref 3.8–10.5)
WBC # FLD AUTO: 9.3 K/UL — SIGNIFICANT CHANGE UP (ref 3.8–10.5)

## 2021-01-23 PROCEDURE — 93306 TTE W/DOPPLER COMPLETE: CPT | Mod: 26

## 2021-01-23 PROCEDURE — 99232 SBSQ HOSP IP/OBS MODERATE 35: CPT

## 2021-01-23 RX ORDER — INSULIN LISPRO 100/ML
6 VIAL (ML) SUBCUTANEOUS
Refills: 0 | Status: DISCONTINUED | OUTPATIENT
Start: 2021-01-23 | End: 2021-01-25

## 2021-01-23 RX ORDER — INSULIN LISPRO 100/ML
6 VIAL (ML) SUBCUTANEOUS
Refills: 0 | Status: DISCONTINUED | OUTPATIENT
Start: 2021-01-24 | End: 2021-01-25

## 2021-01-23 RX ADMIN — INSULIN GLARGINE 12 UNIT(S): 100 INJECTION, SOLUTION SUBCUTANEOUS at 21:36

## 2021-01-23 RX ADMIN — SODIUM CHLORIDE 3 MILLILITER(S): 9 INJECTION INTRAMUSCULAR; INTRAVENOUS; SUBCUTANEOUS at 05:02

## 2021-01-23 RX ADMIN — SODIUM CHLORIDE 3 MILLILITER(S): 9 INJECTION INTRAMUSCULAR; INTRAVENOUS; SUBCUTANEOUS at 12:43

## 2021-01-23 RX ADMIN — Medication 4 UNIT(S): at 12:43

## 2021-01-23 RX ADMIN — Medication 2: at 17:15

## 2021-01-23 RX ADMIN — ATORVASTATIN CALCIUM 80 MILLIGRAM(S): 80 TABLET, FILM COATED ORAL at 20:05

## 2021-01-23 RX ADMIN — Medication 81 MILLIGRAM(S): at 17:15

## 2021-01-23 RX ADMIN — HEPARIN SODIUM 5000 UNIT(S): 5000 INJECTION INTRAVENOUS; SUBCUTANEOUS at 04:57

## 2021-01-23 RX ADMIN — Medication 1: at 12:43

## 2021-01-23 RX ADMIN — Medication 5 UNIT(S): at 08:44

## 2021-01-23 RX ADMIN — LISINOPRIL 10 MILLIGRAM(S): 2.5 TABLET ORAL at 04:57

## 2021-01-23 RX ADMIN — SODIUM CHLORIDE 3 MILLILITER(S): 9 INJECTION INTRAMUSCULAR; INTRAVENOUS; SUBCUTANEOUS at 20:05

## 2021-01-23 RX ADMIN — HEPARIN SODIUM 5000 UNIT(S): 5000 INJECTION INTRAVENOUS; SUBCUTANEOUS at 17:15

## 2021-01-23 RX ADMIN — Medication 5 UNIT(S): at 17:15

## 2021-01-23 NOTE — PROGRESS NOTE ADULT - PROBLEM SELECTOR PLAN 8
Heparin subcutaneous 5000 units q12h.

## 2021-01-23 NOTE — PROGRESS NOTE ADULT - PROBLEM SELECTOR PLAN 7
Patient states that she needs help obtaining medications.  Social work consult ordered.

## 2021-01-23 NOTE — PROGRESS NOTE ADULT - PROBLEM SELECTOR PLAN 6
Patient with leukocytosis of 11.32.  Patient afebrile.  UA ordered.

## 2021-01-23 NOTE — PROGRESS NOTE ADULT - SUBJECTIVE AND OBJECTIVE BOX
Patient is a 60y old  Female who presents with a chief complaint of Visual changes, hypertensive urgency. (22 Jan 2021 17:12)      SUBJECTIVE / OVERNIGHT EVENTS: Examined Pt at the bedside. Denies complaints at this time. No cp or sob.    MEDICATIONS  (STANDING):  aspirin  chewable 81 milliGRAM(s) Oral daily  atorvastatin 80 milliGRAM(s) Oral at bedtime  dextrose 40% Gel 15 Gram(s) Oral once  dextrose 5%. 1000 milliLiter(s) (50 mL/Hr) IV Continuous <Continuous>  dextrose 5%. 1000 milliLiter(s) (100 mL/Hr) IV Continuous <Continuous>  dextrose 50% Injectable 25 Gram(s) IV Push once  dextrose 50% Injectable 12.5 Gram(s) IV Push once  dextrose 50% Injectable 25 Gram(s) IV Push once  glucagon  Injectable 1 milliGRAM(s) IntraMuscular once  heparin   Injectable 5000 Unit(s) SubCutaneous every 12 hours  insulin glargine Injectable (LANTUS) 12 Unit(s) SubCutaneous at bedtime  insulin lispro (ADMELOG) corrective regimen sliding scale   SubCutaneous three times a day before meals  insulin lispro (ADMELOG) corrective regimen sliding scale   SubCutaneous at bedtime  insulin lispro Injectable (ADMELOG) 5 Unit(s) SubCutaneous before dinner  insulin lispro Injectable (ADMELOG) 5 Unit(s) SubCutaneous before breakfast  insulin lispro Injectable (ADMELOG) 4 Unit(s) SubCutaneous before lunch  lisinopril 10 milliGRAM(s) Oral daily  sodium chloride 0.9% lock flush 3 milliLiter(s) IV Push every 8 hours    MEDICATIONS  (PRN):      PHYSICAL EXAM:  Vital Signs Last 24 Hrs  T(C): 36.4 (23 Jan 2021 12:42), Max: 36.9 (23 Jan 2021 04:48)  T(F): 97.6 (23 Jan 2021 12:42), Max: 98.5 (23 Jan 2021 04:48)  HR: 81 (23 Jan 2021 12:42) (81 - 92)  BP: 140/67 (23 Jan 2021 12:42) (137/71 - 153/69)  BP(mean): --  RR: 17 (23 Jan 2021 12:42) (17 - 18)  SpO2: 100% (23 Jan 2021 12:42) (99% - 100%)    CONSTITUTIONAL: NAD, well-developed, well-groomed  RESPIRATORY: Normal respiratory effort; lungs are clear to auscultation bilaterally  CARDIOVASCULAR: Regular rate and rhythm, normal S1 and S2, no murmur/rub/gallop; No lower extremity edema  GASTROINTESTINAL: Nontender to palpation, normoactive bowel sounds, no rebound/guarding; No hepatosplenomegaly  MUSCULOSKELETAL:  no clubbing or cyanosis of digits; no joint swelling or tenderness to palpation  NEUROLOGY: non-focal; no gross sensory deficits   PSYCH: A+O to person, place, and time; affect appropriate  SKIN: No rashes; warm     LABS:                        13.0   9.30  )-----------( 346      ( 23 Jan 2021 07:54 )             42.9     01-23    142  |  106  |  16  ----------------------------<  155<H>  3.9   |  24  |  0.70    Ca    8.8      23 Jan 2021 07:54

## 2021-01-24 LAB
ANION GAP SERPL CALC-SCNC: 13 MMOL/L — SIGNIFICANT CHANGE UP (ref 7–14)
BUN SERPL-MCNC: 14 MG/DL — SIGNIFICANT CHANGE UP (ref 7–23)
CALCIUM SERPL-MCNC: 8.8 MG/DL — SIGNIFICANT CHANGE UP (ref 8.4–10.5)
CHLORIDE SERPL-SCNC: 108 MMOL/L — HIGH (ref 98–107)
CO2 SERPL-SCNC: 25 MMOL/L — SIGNIFICANT CHANGE UP (ref 22–31)
CREAT SERPL-MCNC: 0.64 MG/DL — SIGNIFICANT CHANGE UP (ref 0.5–1.3)
GLUCOSE BLDC GLUCOMTR-MCNC: 129 MG/DL — HIGH (ref 70–99)
GLUCOSE BLDC GLUCOMTR-MCNC: 166 MG/DL — HIGH (ref 70–99)
GLUCOSE BLDC GLUCOMTR-MCNC: 178 MG/DL — HIGH (ref 70–99)
GLUCOSE BLDC GLUCOMTR-MCNC: 180 MG/DL — HIGH (ref 70–99)
GLUCOSE SERPL-MCNC: 153 MG/DL — HIGH (ref 70–99)
HCT VFR BLD CALC: 42.8 % — SIGNIFICANT CHANGE UP (ref 34.5–45)
HGB BLD-MCNC: 12.8 G/DL — SIGNIFICANT CHANGE UP (ref 11.5–15.5)
MCHC RBC-ENTMCNC: 25.8 PG — LOW (ref 27–34)
MCHC RBC-ENTMCNC: 29.9 GM/DL — LOW (ref 32–36)
MCV RBC AUTO: 86.1 FL — SIGNIFICANT CHANGE UP (ref 80–100)
NRBC # BLD: 0 /100 WBCS — SIGNIFICANT CHANGE UP
NRBC # FLD: 0 K/UL — SIGNIFICANT CHANGE UP
PLATELET # BLD AUTO: 336 K/UL — SIGNIFICANT CHANGE UP (ref 150–400)
POTASSIUM SERPL-MCNC: 3.8 MMOL/L — SIGNIFICANT CHANGE UP (ref 3.5–5.3)
POTASSIUM SERPL-SCNC: 3.8 MMOL/L — SIGNIFICANT CHANGE UP (ref 3.5–5.3)
RBC # BLD: 4.97 M/UL — SIGNIFICANT CHANGE UP (ref 3.8–5.2)
RBC # FLD: 13.2 % — SIGNIFICANT CHANGE UP (ref 10.3–14.5)
SODIUM SERPL-SCNC: 146 MMOL/L — HIGH (ref 135–145)
WBC # BLD: 9.07 K/UL — SIGNIFICANT CHANGE UP (ref 3.8–10.5)
WBC # FLD AUTO: 9.07 K/UL — SIGNIFICANT CHANGE UP (ref 3.8–10.5)

## 2021-01-24 PROCEDURE — 99239 HOSP IP/OBS DSCHRG MGMT >30: CPT

## 2021-01-24 RX ORDER — ASPIRIN/CALCIUM CARB/MAGNESIUM 324 MG
1 TABLET ORAL
Qty: 30 | Refills: 0
Start: 2021-01-24 | End: 2021-02-22

## 2021-01-24 RX ORDER — INSULIN GLARGINE 100 [IU]/ML
12 INJECTION, SOLUTION SUBCUTANEOUS
Qty: 5 | Refills: 0
Start: 2021-01-24

## 2021-01-24 RX ORDER — METOPROLOL TARTRATE 50 MG
1 TABLET ORAL
Qty: 30 | Refills: 0
Start: 2021-01-24 | End: 2021-02-22

## 2021-01-24 RX ORDER — ATORVASTATIN CALCIUM 80 MG/1
1 TABLET, FILM COATED ORAL
Qty: 30 | Refills: 0
Start: 2021-01-24 | End: 2021-02-22

## 2021-01-24 RX ORDER — REPAGLINIDE 1 MG/1
1 TABLET ORAL
Qty: 270 | Refills: 0
Start: 2021-01-24 | End: 2021-04-23

## 2021-01-24 RX ORDER — LISINOPRIL 2.5 MG/1
1 TABLET ORAL
Qty: 30 | Refills: 0
Start: 2021-01-24 | End: 2021-02-22

## 2021-01-24 RX ORDER — METOPROLOL TARTRATE 50 MG
25 TABLET ORAL DAILY
Refills: 0 | Status: DISCONTINUED | OUTPATIENT
Start: 2021-01-24 | End: 2021-01-25

## 2021-01-24 RX ADMIN — HEPARIN SODIUM 5000 UNIT(S): 5000 INJECTION INTRAVENOUS; SUBCUTANEOUS at 17:31

## 2021-01-24 RX ADMIN — Medication 5 UNIT(S): at 08:41

## 2021-01-24 RX ADMIN — HEPARIN SODIUM 5000 UNIT(S): 5000 INJECTION INTRAVENOUS; SUBCUTANEOUS at 04:15

## 2021-01-24 RX ADMIN — SODIUM CHLORIDE 3 MILLILITER(S): 9 INJECTION INTRAMUSCULAR; INTRAVENOUS; SUBCUTANEOUS at 12:57

## 2021-01-24 RX ADMIN — INSULIN GLARGINE 12 UNIT(S): 100 INJECTION, SOLUTION SUBCUTANEOUS at 20:46

## 2021-01-24 RX ADMIN — SODIUM CHLORIDE 3 MILLILITER(S): 9 INJECTION INTRAMUSCULAR; INTRAVENOUS; SUBCUTANEOUS at 20:14

## 2021-01-24 RX ADMIN — SODIUM CHLORIDE 3 MILLILITER(S): 9 INJECTION INTRAMUSCULAR; INTRAVENOUS; SUBCUTANEOUS at 04:17

## 2021-01-24 RX ADMIN — ATORVASTATIN CALCIUM 80 MILLIGRAM(S): 80 TABLET, FILM COATED ORAL at 20:45

## 2021-01-24 RX ADMIN — Medication 6 UNIT(S): at 12:56

## 2021-01-24 RX ADMIN — Medication 6 UNIT(S): at 17:31

## 2021-01-24 RX ADMIN — Medication 25 MILLIGRAM(S): at 17:40

## 2021-01-24 RX ADMIN — Medication 1: at 12:56

## 2021-01-24 RX ADMIN — Medication 81 MILLIGRAM(S): at 17:31

## 2021-01-24 RX ADMIN — LISINOPRIL 10 MILLIGRAM(S): 2.5 TABLET ORAL at 04:15

## 2021-01-24 RX ADMIN — Medication 1: at 08:41

## 2021-01-24 NOTE — CHART NOTE - NSCHARTNOTEFT_GEN_A_CORE
Patient is a 60y old  Female who presents with a chief complaint of Visual changes, hypertensive urgency. Maximal evidence based therapy started on the patient. It is recommended that she placed on ASA + statin therapy + ACEi. She will require insulin for uncontrolled DM2.            PHYSICAL EXAM:  Vital Signs Last 24 Hrs  T(C): 37.2 (24 Jan 2021 12:55), Max: 37.2 (24 Jan 2021 12:55)  T(F): 98.9 (24 Jan 2021 12:55), Max: 98.9 (24 Jan 2021 12:55)  HR: 86 (24 Jan 2021 12:55) (80 - 86)  BP: 167/84 (24 Jan 2021 12:55) (153/75 - 167/84)  BP(mean): --  RR: 18 (24 Jan 2021 12:55) (17 - 18)  SpO2: 100% (24 Jan 2021 12:55) (100% - 100%)      LABS:                        12.8   9.07  )-----------( 336      ( 24 Jan 2021 07:28 )             42.8     01-24    146<H>  |  108<H>  |  14  ----------------------------<  153<H>  3.8   |  25  |  0.64    Ca    8.8      24 Jan 2021 07:28
Verified pt home medication with her [facility] pharmacy. Pt has not filled scripts since march.
attempted to call david Tim at [phone] to obtain med rec for patient, no answer, left message.
BG trend reviewed. Adjust Admelog to 5/6/6 and c/w Lantus 12 units qhs. Endocrine service will continue to follow. Plan to dc on Prandin 2 mg before meals and Lantus (or whichever basal insulin is covered) 12 units qhs.    Aj Jovel MD   On evenings and weekends, please call the office at [phone] or page endocrine fellow on call. Please note that this patient may be followed by different provider tomorrow. If no answer, contact the office.
Pt has hx of type 2 diabetes, A1c 13.5, only on metformin at home, house endo consulted. will f/u recs

## 2021-01-24 NOTE — PROVIDER CONTACT NOTE (OTHER) - ACTION/TREATMENT ORDERED:
As per provider Mily Barrientos (#[id number]), do not discharge patient. Reassess BP at 1900
As per provider Mily Barrientos (#[id number]), administer Metoprolol succinate ER 25mg as ordered and reassess BP at 1815
Allowing permissive hypertension. Continue to monitor.

## 2021-01-24 NOTE — PROVIDER CONTACT NOTE (OTHER) - ASSESSMENT
Patient is A&Ox4, Patient denies pain, chest pain, shortness of breath, nausea, vomiting or dizziness.
Patient is A&Ox4, Patient denies pain, chest pain, shortness of breath, nausea, vomiting or dizziness.
Patient /88. Patient endorses continued blurry vision. Neuro check WDL otherwise. Patient not due for any anti-hypertensive medications.

## 2021-01-24 NOTE — PROVIDER CONTACT NOTE (OTHER) - BACKGROUND
Admitted with blurry vision and headache.
60 year old female admitted for visual disturbance
60 year old female admitted for visual disturbance

## 2021-01-24 NOTE — PROVIDER CONTACT NOTE (OTHER) - NAME OF MD/NP/PA/DO NOTIFIED:
[facility] [id number]
Mily Barrientos (#[id number])
Mily Barrientos (#[id number])

## 2021-01-24 NOTE — PROVIDER CONTACT NOTE (OTHER) - RECOMMENDATIONS
As per provider Mily Barrientos (#[id number]), administer Metoprolol succinate ER 25mg as ordered and reassess BP at 1815

## 2021-01-25 ENCOUNTER — TRANSCRIPTION ENCOUNTER (OUTPATIENT)
Age: 61
End: 2021-01-25

## 2021-01-25 VITALS — DIASTOLIC BLOOD PRESSURE: 80 MMHG | HEART RATE: 90 BPM | SYSTOLIC BLOOD PRESSURE: 155 MMHG

## 2021-01-25 LAB
GLUCOSE BLDC GLUCOMTR-MCNC: 112 MG/DL — HIGH (ref 70–99)
GLUCOSE BLDC GLUCOMTR-MCNC: 184 MG/DL — HIGH (ref 70–99)
TSH RECEP AB FLD-ACNC: <1.1 IU/L — SIGNIFICANT CHANGE UP (ref 0–1.75)

## 2021-01-25 PROCEDURE — 99232 SBSQ HOSP IP/OBS MODERATE 35: CPT

## 2021-01-25 RX ORDER — LISINOPRIL 2.5 MG/1
1 TABLET ORAL
Qty: 30 | Refills: 0
Start: 2021-01-25 | End: 2021-02-23

## 2021-01-25 RX ORDER — METOPROLOL TARTRATE 50 MG
50 TABLET ORAL DAILY
Refills: 0 | Status: DISCONTINUED | OUTPATIENT
Start: 2021-01-26 | End: 2021-01-25

## 2021-01-25 RX ORDER — LISINOPRIL 2.5 MG/1
10 TABLET ORAL ONCE
Refills: 0 | Status: DISCONTINUED | OUTPATIENT
Start: 2021-01-25 | End: 2021-01-25

## 2021-01-25 RX ORDER — METOPROLOL TARTRATE 50 MG
1 TABLET ORAL
Qty: 30 | Refills: 0
Start: 2021-01-25 | End: 2021-02-23

## 2021-01-25 RX ORDER — ATORVASTATIN CALCIUM 80 MG/1
1 TABLET, FILM COATED ORAL
Qty: 30 | Refills: 0
Start: 2021-01-25 | End: 2021-02-23

## 2021-01-25 RX ORDER — LISINOPRIL 2.5 MG/1
20 TABLET ORAL DAILY
Refills: 0 | Status: DISCONTINUED | OUTPATIENT
Start: 2021-01-26 | End: 2021-01-25

## 2021-01-25 RX ORDER — ASPIRIN/CALCIUM CARB/MAGNESIUM 324 MG
1 TABLET ORAL
Qty: 30 | Refills: 0
Start: 2021-01-25 | End: 2021-02-23

## 2021-01-25 RX ORDER — INSULIN GLARGINE 100 [IU]/ML
12 INJECTION, SOLUTION SUBCUTANEOUS
Qty: 100 | Refills: 0
Start: 2021-01-25 | End: 2021-02-23

## 2021-01-25 RX ORDER — REPAGLINIDE 1 MG/1
1 TABLET ORAL
Qty: 270 | Refills: 0
Start: 2021-01-25 | End: 2021-04-24

## 2021-01-25 RX ADMIN — Medication 1: at 12:50

## 2021-01-25 RX ADMIN — Medication 25 MILLIGRAM(S): at 05:17

## 2021-01-25 RX ADMIN — SODIUM CHLORIDE 3 MILLILITER(S): 9 INJECTION INTRAMUSCULAR; INTRAVENOUS; SUBCUTANEOUS at 04:42

## 2021-01-25 RX ADMIN — HEPARIN SODIUM 5000 UNIT(S): 5000 INJECTION INTRAVENOUS; SUBCUTANEOUS at 05:20

## 2021-01-25 RX ADMIN — Medication 5 UNIT(S): at 08:18

## 2021-01-25 RX ADMIN — LISINOPRIL 10 MILLIGRAM(S): 2.5 TABLET ORAL at 05:20

## 2021-01-25 RX ADMIN — Medication 6 UNIT(S): at 12:50

## 2021-01-25 RX ADMIN — Medication 81 MILLIGRAM(S): at 12:49

## 2021-01-25 NOTE — PROGRESS NOTE ADULT - PROBLEM SELECTOR PROBLEM 1
Uncontrolled type 2 diabetes mellitus with hyperglycemia
Uncontrolled type 2 diabetes mellitus with hyperglycemia
Hypertensive urgency
Visual changes

## 2021-01-25 NOTE — PROGRESS NOTE ADULT - PROBLEM SELECTOR PROBLEM 2
Essential hypertension
Essential hypertension
Type 2 diabetes mellitus
Hypertensive urgency

## 2021-01-25 NOTE — PROGRESS NOTE ADULT - REASON FOR ADMISSION
Visual changes, hypertensive urgency.

## 2021-01-25 NOTE — PROGRESS NOTE ADULT - SUBJECTIVE AND OBJECTIVE BOX
[facility] Division of Hospital Medicine  Claudy Dover MD  Pager [phone]    Patient is a 60y old  Female who presents with a chief complaint of Visual changes, hypertensive urgency. (23 Jan 2021 15:47)      SUBJECTIVE / OVERNIGHT EVENTS:      MEDICATIONS  (STANDING):  aspirin  chewable 81 milliGRAM(s) Oral daily  atorvastatin 80 milliGRAM(s) Oral at bedtime  dextrose 40% Gel 15 Gram(s) Oral once  dextrose 5%. 1000 milliLiter(s) (50 mL/Hr) IV Continuous <Continuous>  dextrose 5%. 1000 milliLiter(s) (100 mL/Hr) IV Continuous <Continuous>  dextrose 50% Injectable 25 Gram(s) IV Push once  dextrose 50% Injectable 12.5 Gram(s) IV Push once  dextrose 50% Injectable 25 Gram(s) IV Push once  glucagon  Injectable 1 milliGRAM(s) IntraMuscular once  heparin   Injectable 5000 Unit(s) SubCutaneous every 12 hours  insulin glargine Injectable (LANTUS) 12 Unit(s) SubCutaneous at bedtime  insulin lispro (ADMELOG) corrective regimen sliding scale   SubCutaneous three times a day before meals  insulin lispro (ADMELOG) corrective regimen sliding scale   SubCutaneous at bedtime  insulin lispro Injectable (ADMELOG) 6 Unit(s) SubCutaneous before lunch  insulin lispro Injectable (ADMELOG) 6 Unit(s) SubCutaneous before dinner  insulin lispro Injectable (ADMELOG) 5 Unit(s) SubCutaneous before breakfast  lisinopril 10 milliGRAM(s) Oral daily  metoprolol succinate ER 25 milliGRAM(s) Oral daily  sodium chloride 0.9% lock flush 3 milliLiter(s) IV Push every 8 hours    MEDICATIONS  (PRN):      CAPILLARY BLOOD GLUCOSE      POCT Blood Glucose.: 112 mg/dL (25 Jan 2021 07:37)  POCT Blood Glucose.: 166 mg/dL (24 Jan 2021 20:44)  POCT Blood Glucose.: 129 mg/dL (24 Jan 2021 16:59)  POCT Blood Glucose.: 178 mg/dL (24 Jan 2021 12:01)    I&O's Summary      PHYSICAL EXAM:  Vital Signs Last 24 Hrs  T(C): 36.8 (25 Jan 2021 05:15), Max: 37.2 (24 Jan 2021 12:55)  T(F): 98.3 (25 Jan 2021 05:15), Max: 98.9 (24 Jan 2021 12:55)  HR: 80 (25 Jan 2021 05:15) (80 - 90)  BP: 156/70 (25 Jan 2021 05:15) (156/70 - 193/96)  BP(mean): --  RR: 16 (25 Jan 2021 05:15) (16 - 18)  SpO2: 100% (25 Jan 2021 05:15) (100% - 100%)  CONSTITUTIONAL: NAD, well-developed, well-groomed  RESPIRATORY: Normal respiratory effort; lungs are clear to auscultation bilaterally  CARDIOVASCULAR: Regular rate and rhythm, normal S1 and S2, no murmur/rub/gallop; No lower extremity edema  GASTROINTESTINAL: Nontender to palpation, normoactive bowel sounds, no rebound/guarding; No hepatosplenomegaly  MUSCULOSKELETAL:  no clubbing or cyanosis of digits; no joint swelling or tenderness to palpation  NEUROLOGY: non-focal; no gross sensory deficits   PSYCH: A+O to person, place, and time; affect appropriate  SKIN: No rashes; warm       LABS:                        12.8   9.07  )-----------( 336      ( 24 Jan 2021 07:28 )             42.8     01-24    146<H>  |  108<H>  |  14  ----------------------------<  153<H>  3.8   |  25  |  0.64    Ca    8.8      24 Jan 2021 07:28                  RADIOLOGY & ADDITIONAL TESTS:  Results Reviewed:   Imaging Personally Reviewed:  Electrocardiogram Personally Reviewed:    COORDINATION OF CARE:  Care Discussed with Consultants/Other Providers [Y/N]:  Prior or Outpatient Records Reviewed [Y/N]:    CODE: FULL   [facility] Division of Hospital Medicine  Claudy Dover MD  Pager [phone]    Patient is a 60y old  Female who presents with a chief complaint of Visual changes, hypertensive urgency. (23 Jan 2021 15:47)      SUBJECTIVE / OVERNIGHT EVENTS: Denies c/o      MEDICATIONS  (STANDING):  aspirin  chewable 81 milliGRAM(s) Oral daily  atorvastatin 80 milliGRAM(s) Oral at bedtime  dextrose 40% Gel 15 Gram(s) Oral once  dextrose 5%. 1000 milliLiter(s) (50 mL/Hr) IV Continuous <Continuous>  dextrose 5%. 1000 milliLiter(s) (100 mL/Hr) IV Continuous <Continuous>  dextrose 50% Injectable 25 Gram(s) IV Push once  dextrose 50% Injectable 12.5 Gram(s) IV Push once  dextrose 50% Injectable 25 Gram(s) IV Push once  glucagon  Injectable 1 milliGRAM(s) IntraMuscular once  heparin   Injectable 5000 Unit(s) SubCutaneous every 12 hours  insulin glargine Injectable (LANTUS) 12 Unit(s) SubCutaneous at bedtime  insulin lispro (ADMELOG) corrective regimen sliding scale   SubCutaneous three times a day before meals  insulin lispro (ADMELOG) corrective regimen sliding scale   SubCutaneous at bedtime  insulin lispro Injectable (ADMELOG) 6 Unit(s) SubCutaneous before lunch  insulin lispro Injectable (ADMELOG) 6 Unit(s) SubCutaneous before dinner  insulin lispro Injectable (ADMELOG) 5 Unit(s) SubCutaneous before breakfast  lisinopril 10 milliGRAM(s) Oral daily  metoprolol succinate ER 25 milliGRAM(s) Oral daily  sodium chloride 0.9% lock flush 3 milliLiter(s) IV Push every 8 hours    MEDICATIONS  (PRN):      CAPILLARY BLOOD GLUCOSE      POCT Blood Glucose.: 112 mg/dL (25 Jan 2021 07:37)  POCT Blood Glucose.: 166 mg/dL (24 Jan 2021 20:44)  POCT Blood Glucose.: 129 mg/dL (24 Jan 2021 16:59)  POCT Blood Glucose.: 178 mg/dL (24 Jan 2021 12:01)    I&O's Summary      PHYSICAL EXAM:  Vital Signs Last 24 Hrs  T(C): 36.8 (25 Jan 2021 05:15), Max: 37.2 (24 Jan 2021 12:55)  T(F): 98.3 (25 Jan 2021 05:15), Max: 98.9 (24 Jan 2021 12:55)  HR: 80 (25 Jan 2021 05:15) (80 - 90)  BP: 156/70 (25 Jan 2021 05:15) (156/70 - 193/96)  BP(mean): --  RR: 16 (25 Jan 2021 05:15) (16 - 18)  SpO2: 100% (25 Jan 2021 05:15) (100% - 100%)  CONSTITUTIONAL: NAD, well-developed, well-groomed  RESPIRATORY: Normal respiratory effort; lungs are clear to auscultation bilaterally  CARDIOVASCULAR: Regular rate and rhythm, normal S1 and S2, no murmur/rub/gallop; No lower extremity edema  GASTROINTESTINAL: Nontender to palpation, normoactive bowel sounds, no rebound/guarding; No hepatosplenomegaly  MUSCULOSKELETAL:  no clubbing or cyanosis of digits; no joint swelling or tenderness to palpation  NEUROLOGY: non-focal; no gross sensory deficits   PSYCH: A+O to person, place, and time; affect appropriate  SKIN: No rashes; warm       LABS:                        12.8   9.07  )-----------( 336      ( 24 Jan 2021 07:28 )             42.8     01-24    146<H>  |  108<H>  |  14  ----------------------------<  153<H>  3.8   |  25  |  0.64    Ca    8.8      24 Jan 2021 07:28            CODE: FULL

## 2021-01-25 NOTE — PROGRESS NOTE ADULT - PROBLEM SELECTOR PLAN 3
- c/w statin
Patient with QTc of 537. Patient has empty bottle of amitriptyline. --->pending ECG
Patient with QTc of 537. Patient has empty bottle of amitriptyline. --->pending ECG
- 2/2 to end organ involvement with substantial elevations in BP during ED evaluation.  - MRA Brain - no acute pathology (No evidence acute hemorrhage, mass or mass effect)  Neuro recs appreciated.  Aspirin 81 mg daily  Atorvastatin 80 mg daily  Patient will need close outpatient PCP follow up management of medical comorbidities and medication compliance.  Upon discharge, please have patient follow up with Stroke Neurology within 1 week:  Tamika Marc NP at [address], [address] [place]  [phone]  Please email patients info to [email or web address]
- c/w statin
Patient with QTc of 537. Patient has empty bottle of amitriptyline. --->pending ECG

## 2021-01-25 NOTE — PROGRESS NOTE ADULT - PROBLEM SELECTOR PLAN 4
- 2/2 to end organ involvement with substantial elevations in BP during ED evaluation.  - MRA Brain - no acute pathology (No evidence acute hemorrhage, mass or mass effect)  Admit to tele, continue monitoring.  Neuro recs appreciated.  Permissive HTN up to 220/110 for first 48-72 hours after sympton onset to be followed by gradual normotension.  MRI brain w/o contrast ordered.  Echo with bubble study ordered.  Aspirin 81 mg daily  Atorvastatin 80 mg daily. Titrate for LDL <70.  A1c and lipid panel ordered with AM labs.  Heparin subcutaneous 5000 units q12h.  Dysphagia screen passed. Dysphagia diet ordered.  Patient will need close outpatient PCP follow up management of medical comorbidities and medication compliance.  Upon discharge, please have patient follow up with Stroke Neurology within 1 week:  Tamika Marc NP at [address] [facility], [address] [place]  [phone]  Please email patients info to [email or web address]
- labs consistent with subclinical hyperthyroidism (versus sick euthyroid but would not expect TSH to be so low)  - follow up TSH receptor Ab and TSI  - thyroid ultrasound images personally reviewed, multiple nodules present and the one in the left lobe is particularly large; subclinical hyperthyroidism may be due to hot nodule  - she requires repeat TFTs as outpatient and NM uptake and scan prior to consideration of biopsy of any of the nodules
- labs consistent with subclinical hyperthyroidism versus sick euthyroid (but would not expect TSH to be so low), could also possibly be central hypothyroidism  - check TSH receptor Ab and TSI  - obtain thyroid ultrasound as either inpatient or outpatient
Thyroid U/S ---->Bilateral thyroid nodules including a 4.0 cm nodule in the left lower pole, for which FNA biopsy is recommended.  - Requires outpatient arrangement for FNA of nodules to r/o CA.
- Given risk factors the patient requires the start of Humulin/Novolin 70/30 w/ elevated A1c 13.5% meaning her serum glucose elevations are >350 mg/dL on average.  - Insulin as well as oral agents is best to a goal of 7.0 to 8.5% as she ages. Metformin should be stopped at Cr >1.4 elevations. She can cont. use now. In addition use of a high intensity statin is recommended.  Patient is supposed to be on metformin 1000 mg BID but states she ran out.  Patient placed on low dose insulin sliding scale and consistent carbohydrate diet.  Consider endocrine consult recommendations for discharge.  Monitor fingersticks. Hemoglobin A1C in am.
- 2/2 to end organ involvement with substantial elevations in BP during ED evaluation.  - MRA Brain - no acute pathology (No evidence acute hemorrhage, mass or mass effect)  Admit to tele, continue monitoring.  Neuro recs appreciated.  Permissive HTN up to 220/110 for first 48-72 hours after sympton onset to be followed by gradual normotension.  MRI brain w/o contrast ordered.  Echo with bubble study ordered.  Aspirin 81 mg daily  Atorvastatin 80 mg daily. Titrate for LDL <70.  A1c and lipid panel ordered with AM labs.  Heparin subcutaneous 5000 units q12h.  Dysphagia screen passed. Dysphagia diet ordered.  Patient will need close outpatient PCP follow up management of medical comorbidities and medication compliance.  Upon discharge, please have patient follow up with Stroke Neurology within 1 week:  Tamika Marc NP at [address] [facility], [address] [place]  [phone]  Please email patients info to [email or web address]

## 2021-01-25 NOTE — DISCHARGE NOTE NURSING/CASE MANAGEMENT/SOCIAL WORK - PATIENT PORTAL LINK FT
You can access the FollowMyHealth Patient Portal offered by [facility] by registering at the following website: [email or web address]. By joining [facility]’s FollowMyHealth portal, you will also be able to view your health information using other applications (apps) compatible with our system.

## 2021-01-25 NOTE — PROGRESS NOTE ADULT - PROBLEM SELECTOR PLAN 1
- (Evidence JNC 8) Start lisinopril 10 mg po qd and titrate accordingly for BP control (start low and go slow); High risk for micro/macro-vascular events risk reduction + Pt's risk factors with DM2 and metabolic syndrome + end organ protection.  - Patient with SBP initially of 211 on arrival to ED.  - Permissive HTN up to 220/110 for first 48-72 hours after sympton onset to be followed by gradual normotension.  - Trying reaching to son in AM to find out BP medication patient is on.  - DASH/TLC diet ordered.
- Stabilizing BP <150 SBP now  - Awaits TTE to r/o cardiogenic pathology prior to her departure.  - (Evidence JNC 8) Start lisinopril 10 mg po qd and titrate accordingly for BP control (start low and go slow); High risk for micro/macro-vascular events risk reduction + Pt's risk factors with DM2 and metabolic syndrome + end organ protection.  - Patient with SBP initially of 211 on arrival to ED.  - Permissive HTN up to 220/110 for first 48-72 hours after sympton onset to be followed by gradual normotension.  - Trying reaching to son in AM to find out BP medication patient is on.  - DASH/TLC diet ordered.  - Can be d/c once TTE completed plus eval by PT.
- A1c 13.5%, has not been on medications for months  - adjust basal bolus insulin as follows  - Lantus 12 units qhs and Admelog 5/4/5 units before meals  - low correction scale qac and qhs  - consistent carb diet  - check FS qac and qhs  - seen by RD  - will follow  - for discharge: she has insurance now so can dc on Lantus 12 units qhs (or whichever basal insulin is covered) and Prandin 2 mg before meals. Had diarrhea in the past on Metformin. Daughter in law is involved in her care. She can follow up in the [facility] endocrine clinic on discharge, appt is on 4/20/2021 at 9 am.
- Stabilizing BP   - TTE unremarkable  - increase lisinopril to 20mg daily, toprol to 50mg daily  stable for d/c home  d/c time 37 min coordinating care
- A1c 13.5%, has not been on medications for months  - adjust basal bolus insulin as follows  - Lantus 12 units qhs and Admelog 5/4/4 units before meals  - low correction scale qac and qhs  - consistent carb diet  - check FS qac and qhs  - seen by RD  - will follow  - for discharge: suggest dc on Humulin/Novolin 70/30, doses to be determined, which patient is agreeable to. Had diarrhea in the past on Metformin. Start insulin syringe and vial teaching with patient. She can follow up in the [facility] endocrine clinic on discharge, appt is on 4/20/2021 at 9 am.
- 2/2 to end organ involvement with substantial elevations in BP during ED evaluation.  - MRA Brain - no acute pathology (No evidence acute hemorrhage, mass or mass effect)  Admit to tele, continue monitoring.  Neuro recs appreciated.  Permissive HTN up to 220/110 for first 48-72 hours after sympton onset to be followed by gradual normotension.  MRI brain w/o contrast ordered.  Echo with bubble study ordered.  Aspirin 81 mg daily  Atorvastatin 80 mg daily. Titrate for LDL <70.  A1c and lipid panel ordered with AM labs.  Heparin subcutaneous 5000 units q12h.  Dysphagia screen passed. Dysphagia diet ordered.  Patient will need close outpatient PCP follow up management of medical comorbidities and medication compliance.  Upon discharge, please have patient follow up with Stroke Neurology within 1 week:  Tamika Marc NP at [address], [address] [place]  [phone]  Please email patients info to [email or web address]

## 2021-01-25 NOTE — PROGRESS NOTE ADULT - PROBLEM SELECTOR PLAN 2
- BP elevated, goal BP at least < 140/90  - management per primary team
- ACEi addition today for renal protection as well as BP control.  - Given risk factors the patient requires the start of Humulin/Novolin 70/30 w/ elevated A1c 13.5% meaning her serum glucose elevations are >350 mg/dL on average.  - Insulin as well as oral agents is best to a goal of 7.0 to 8.5% as she ages. Metformin should be stopped at Cr >1.4 elevations. She can cont. use now. In addition use of a high intensity statin is recommended.  Patient is supposed to be on metformin 1000 mg BID but states she ran out.  Patient placed on low dose insulin sliding scale and consistent carbohydrate diet.  Consider endocrine consult recommendations for discharge.  Monitor fingersticks. Hemoglobin A1C in am.
- ACEi for renal protection as well as BP control.  -appreciate endo recs, basal/bolus insulin, resume metformin on d/c
- BP elevated, goal BP at least < 140/90  - management per primary team
- ACEi addition today for renal protection as well as BP control.  - Given risk factors the patient requires the start of Humulin/Novolin 70/30 w/ elevated A1c 13.5% meaning her serum glucose elevations are >350 mg/dL on average.  - Insulin as well as oral agents is best to a goal of 7.0 to 8.5% as she ages. Metformin should be stopped at Cr >1.4 elevations. She can cont. use now. In addition use of a high intensity statin is recommended.  Patient is supposed to be on metformin 1000 mg BID but states she ran out.  Patient placed on low dose insulin sliding scale and consistent carbohydrate diet.  Consider endocrine consult recommendations for discharge.  Monitor fingersticks. Hemoglobin A1C in am.
- High risk for micro/macro-vascular events occuring requiring aggressive therapeutic intervention  - Reduced SBP by 25% in 24 hours; requires evidence based implementation of a BP regimen that will assist Pt in controlling her BP---->ACEi + Thiazide agent + CCB is a consideration based on the JNC 8 findings and the Pt's risk factors with DM2 and metabolic syndrome w/ recent TIA.  Patient with SBP initially of 211 on arrival to ED.  Permissive HTN up to 220/110 for first 48-72 hours after sympton onset to be followed by gradual normotension.  Trying reaching to son in AM to find out BP medication patient is on.  DASH/TLC diet ordered.

## 2021-01-25 NOTE — PROGRESS NOTE ADULT - PROBLEM SELECTOR PROBLEM 3
Prolonged QT interval
Visual changes
Hyperlipidemia, unspecified hyperlipidemia type
Hyperlipidemia, unspecified hyperlipidemia type
Prolonged QT interval
Prolonged QT interval

## 2021-01-25 NOTE — PROGRESS NOTE ADULT - ASSESSMENT
61 y/o [place] creole speaking F with PMH of HTN and type 2 diabetes (On Metformin) presents to the ED complaining of blurry vision in left eye and headache that have since resolved.
60 year old [place]ian creole speaking woman with PMH of HTN, HLD, uncontrolled DM2, presents to the ED complaining of blurry vision in left eye and headache. Consult called for management of uncontrolled DM2, A1c is 13.5%. Also found to subclinical hyperthyroidism and thyroid nodules. BG goal 100-180 mg/dL. 
60 year old [place]ian creole speaking woman with PMH of HTN, HLD, uncontrolled DM2, presents to the ED complaining of blurry vision in left eye and headache. Consult called for management of uncontrolled DM2, A1c is 13.5%. Also found to have low TSH (<0.1). BG goal 100-180 mg/dL. 
59 y/o [place]ym[place] creole speaking F with PMH of HTN and type 2 diabetes (On Metformin) presents to the ED complaining of blurry vision in left eye and headache that have since resolved.
59 y/o Puerto Rican creole speaking F with PMH of HTN and type 2 diabetes (On Metformin) presents to the ED complaining of blurry vision in left eye and headache.  
59 y/o Estonian creole speaking F with PMH of HTN and type 2 diabetes (On Metformin) presents to the ED complaining of blurry vision in left eye and headcache.

## 2021-01-25 NOTE — PROGRESS NOTE ADULT - PROBLEM SELECTOR PLAN 5
Thyroid U/S ---->Bilateral thyroid nodules including a 4.0 cm nodule in the left lower pole, for which FNA biopsy is recommended.  - Requires outpatient arrangement for FNA of nodules to r/o CA.
Thyroid U/S ---->Bilateral thyroid nodules including a 4.0 cm nodule in the left lower pole, for which FNA biopsy is recommended.  - Requires outpatient arrangement for FNA of nodules to r/o CA.
Transitions of Care Status:  1.  Name of PCP:  2.  PCP Contacted on Admission: [ ] Y    [ ] N    3.  PCP contacted at Discharge: [ ] Y    [ ] N    [ ] N/A  4.  Post-Discharge Appointment Date and Location:  5.  Summary of Handoff given to PCP:
Thyroid U/S ---->Bilateral thyroid nodules including a 4.0 cm nodule in the left lower pole, for which FNA biopsy is recommended.  - Requires outpatient arrangement for FNA of nodules to r/o CA.

## 2021-02-03 ENCOUNTER — TRANSCRIPTION ENCOUNTER (OUTPATIENT)
Age: 61
End: 2021-02-03

## 2021-02-03 ENCOUNTER — APPOINTMENT (OUTPATIENT)
Dept: INTERNAL MEDICINE | Facility: CLINIC | Age: 61
End: 2021-02-03

## 2021-02-03 ENCOUNTER — APPOINTMENT (OUTPATIENT)
Dept: NEUROLOGY | Facility: CLINIC | Age: 61
End: 2021-02-03
Payer: MEDICAID

## 2021-02-03 PROCEDURE — 99213 OFFICE O/P EST LOW 20 MIN: CPT

## 2021-02-03 PROCEDURE — 99072 ADDL SUPL MATRL&STAF TM PHE: CPT

## 2021-02-19 NOTE — ED ADULT NURSE NOTE - NSFALLRSKINDICATORS_ED_ALL_ED
Patient is not at baseline mental status Patient has also had falls Patient needs ER evaluation Patient may have had stroke , he may have hit his head with the falls or he may have an infetion somewhere Patient family agreed to take him to ER
no

## 2021-04-20 ENCOUNTER — OUTPATIENT (OUTPATIENT)
Dept: OUTPATIENT SERVICES | Facility: HOSPITAL | Age: 61
LOS: 1 days | End: 2021-04-20

## 2021-04-20 ENCOUNTER — APPOINTMENT (OUTPATIENT)
Dept: ENDOCRINOLOGY | Facility: CLINIC | Age: 61
End: 2021-04-20
Payer: MEDICAID

## 2021-04-20 PROCEDURE — ZZZZZ: CPT

## 2021-04-20 RX ORDER — LISINOPRIL 20 MG/1
20 TABLET ORAL DAILY
Qty: 30 | Refills: 0 | Status: DISCONTINUED | COMMUNITY
Start: 2021-02-02 | End: 2021-04-20

## 2021-04-21 NOTE — HISTORY OF PRESENT ILLNESS
[Home] : at home, [unfilled] , at the time of the visit. [Medical Office: ([facility])___] : at the medical office located in  [Family Member] : family member [Verbal consent obtained from patient] : the patient, [unfilled] [FreeTextEntry1] : 60 year old [place]ian creole speaking woman here for new patient visit to establish care for diabetes mellitus. \par \par  services offered during this telephone visit but patient declined and preferred Son Jesus to help with the translation. \par \par PMH: HTN, HLD, uncontrolled DM2\par PSH: not significant \par FH: type 2 diabetes in multiple family members, + thyroid disease in sister\par SH: No tobacco, alcohol or illicit drug use\par \par History of DM2 > 10 years ago, was following with her PCP previously. \par \par Patient was recently admitted to [facility] in 1/2021 after she presented to the ED complaining of blurry vision in left eye and headache, found to have possible pontine CVA. A1c then was 13.5%. Was on Metformin 1 gram BID and Glipizide 10 mg ER daily, but ran out months ago so she was not taking. She was still checking BG periodically and were noted to be elevated to 400s a week prior to hospital admission.  Was discharged on basal insulin (Basaglar 12 units qhs) and Prandin (2 mg TID). \par \par Current medications/ Insulin regimen: Basaglar 12 units qhs but only taking when BG >200 (usually 3-4 times a week). Currently not taking Prandin, says pharmacy did not give Prandin.  \par \par Checks FS  daily. Usually ranges in AM: 160s-180s when she does not take insulin night before , 100s-110s when she takes insulin night before; Bedtime 150s-200s. \par \par Eat 3 meals a day:\par Breakfast: Cornmeal, coffee (little sugar, no milk)\par Lunch: brown rice (little) , vegetables or beans with meat\par Dinner:  same as lunch \par Snacks: crackers or biscuits (occasionally) \par Drinks: little juice sometimes but adds water , no sodas \par \par Last Opthalmology visit: Feb 2021, had dilated eye exam, no retinopathy, has never received laser or injections to the eyes, slight cataract.  \par Last Podiatry visit: never seen, she reports neuropathy in the feet. \par Urine microalbumin/Cr: No known nephropathy, eGFR 112\par \par HTN: On Metoprolol and Lisinopril. \par \par HLD:  On atorvastatin 80 mg qhs. Lipid profile (1/20/2021): Total cholesterol 175/ Triglycerides 95/ HDL 33/ .\par \par \par Patient was also found to have low TSH <0.10, FT4 1.3, TT3 136, TSI <0.10, TSH receptor ab <1.10. \par She denied prior hx of thyroid disease and has never been on thyroid medications.  \par Reports hx of thyroid disease in her sister (unclear what).\par US thyroid 1/21/2021\par FINDINGS:\par Right Lobe: 5.3 cm x 1.9 cm x  1.8 cm.\par *  Spongiform nodule in the upper pole measuring 4 mm.\par *  Hypoechoic nodule in the interpolar region measuring 3 mm.\par *  Hypoechoic nodule in the lower pole measuring 5 mm.\par *  Macrocalcified isoechoic nodule in the interpolar region measuring 1.1 x 0.9 x 1.3 cm.\par Left Lobe: 5.7 cm x  2.6 cm x 2.4 cm.\par *  Lobulated hypoechoic calcified nodule in the mid to lower pole measures 4.0 x 2.3 x 2.2 cm.\par *  Spongiform nodule in the upper pole measures 5 mm.\par Isthmus: 0.3 cm.\par Cervical Lymph Nodes: No enlarged or abnormal morphology cervical nodes.\par IMPRESSION:\par Bilateral thyroid nodules including a 4.0 cm nodule in the left lower pole, for which FNA biopsy is recommended.\par \par Currently denies any polyuria, polydipsia or blurry vision.\par Endorses occasional fatigue, heat or cold intolerance and diarrhea. Repots dry cough and occasionally mucinous cough for past 2-3 months. \par Denies neck complaints, chest pain, palpitations, sob, abdominal pain or weight changes. \par

## 2021-04-21 NOTE — ASSESSMENT
[Carbohydrate Consistent Diet] : carbohydrate consistent diet [Importance of Diet and Exercise] : importance of diet and exercise to improve glycemic control, achieve weight loss and improve cardiovascular health [Self Monitoring of Blood Glucose] : self monitoring of blood glucose [FreeTextEntry1] : 60 year old female with uncontrolled T2DM complicated by neuropathy and CVA, HTN, HLD. \par \par Uncontrolled T2DM\par - A1C 13.5% in Jan 2021, recheck now. \par - Recommended to take Basaglar 12 units sq qhs consistently\par - Clinical pharmacist spoke to [facility] pharmacy and she does have prescription of Prandin for , will adjust dose to 1 mg ac TID since BG are now improved since hospital visit. \par - Recommended to continue to monitor BG 2-3 times a day and keep a log to bring at next visit. Asked to call if persistent highs or lows.\par - Counseled about diet, weight loss and exercise \par - UTD with opthalmology, no retinopathy\par - Does have neuropathy, will do foot exam at next visit and provide referral for podiatry.\par - Check urine microalbumin / Cr \par \par HTN\par BP goal <130/80\par On metoprolol and Lisinopril. Patient reporting cough for past 2-3 months, will switch from Lisinopril to Losartan\par \par HLD\par  in 1/2021, goal is <70. On high intensity statin. Recheck lipid profile at next visit. \par \par Low TSH level\par - 1/2021 Low TSH <0.10, FT4 1.3, TT3 136, TSI <0.10, TSH receptor ab <1.10. \par - Thyroid US 1/2021 showed bilateral thyroid nodules including a 4.0 cm nodule in the left lower pole.\par - Will repeat TFTs now as well as get NM uptake and scan to evaluate for hot nodule before considering biopsy.\par \par Follow up visit in 3 months. \par \par Discussed with Dr. Dover.

## 2021-04-21 NOTE — REVIEW OF SYSTEMS
[Fatigue] : fatigue [Pain/Numbness of Digits] : pain/numbness of digits [All other systems negative] : All other systems negative [Cough] : cough [Diarrhea] : diarrhea [Cold Intolerance] : cold intolerance [Heat Intolerance] : heat intolerance [Negative] : Musculoskeletal [Recent Weight Gain (___ Lbs)] : no recent weight gain [Recent Weight Loss (___ Lbs)] : no recent weight loss [Fever] : no fever [Chills] : no chills [Eye Pain] : no pain [Blurred Vision] : no blurred vision [Dysphagia] : no dysphagia [Neck Pain] : no neck pain [Dysphonia] : no dysphonia [Chest Pain] : no chest pain [Palpitations] : no palpitations [Shortness Of Breath] : no shortness of breath [Nausea] : no nausea [Abdominal Pain] : no abdominal pain [Vomiting] : no vomiting [Polyuria] : no polyuria [Dizziness] : no dizziness [Polydipsia] : no polydipsia [Swelling] : no swelling

## 2021-04-22 DIAGNOSIS — E04.1 NONTOXIC SINGLE THYROID NODULE: ICD-10-CM

## 2021-04-22 DIAGNOSIS — I10 ESSENTIAL (PRIMARY) HYPERTENSION: ICD-10-CM

## 2021-04-22 DIAGNOSIS — E11.65 TYPE 2 DIABETES MELLITUS WITH HYPERGLYCEMIA: ICD-10-CM

## 2021-04-22 DIAGNOSIS — E78.5 HYPERLIPIDEMIA, UNSPECIFIED: ICD-10-CM

## 2021-04-22 DIAGNOSIS — R79.89 OTHER SPECIFIED ABNORMAL FINDINGS OF BLOOD CHEMISTRY: ICD-10-CM

## 2021-06-08 ENCOUNTER — OUTPATIENT (OUTPATIENT)
Dept: OUTPATIENT SERVICES | Facility: HOSPITAL | Age: 61
LOS: 1 days | End: 2021-06-08
Payer: MEDICAID

## 2021-06-08 ENCOUNTER — APPOINTMENT (OUTPATIENT)
Dept: NUCLEAR MEDICINE | Facility: HOSPITAL | Age: 61
End: 2021-06-08
Payer: MEDICAID

## 2021-06-08 ENCOUNTER — RESULT REVIEW (OUTPATIENT)
Age: 61
End: 2021-06-08

## 2021-06-08 DIAGNOSIS — E05.90 THYROTOXICOSIS, UNSPECIFIED WITHOUT THYROTOXIC CRISIS OR STORM: ICD-10-CM

## 2021-06-09 ENCOUNTER — RESULT REVIEW (OUTPATIENT)
Age: 61
End: 2021-06-09

## 2021-06-09 ENCOUNTER — APPOINTMENT (OUTPATIENT)
Dept: NUCLEAR MEDICINE | Facility: HOSPITAL | Age: 61
End: 2021-06-09
Payer: MEDICAID

## 2021-06-09 PROCEDURE — 99202 OFFICE O/P NEW SF 15 MIN: CPT

## 2021-06-09 PROCEDURE — 78014 THYROID IMAGING W/BLOOD FLOW: CPT

## 2021-06-09 PROCEDURE — 78014 THYROID IMAGING W/BLOOD FLOW: CPT | Mod: 26

## 2021-06-09 PROCEDURE — A9516: CPT

## 2021-06-15 ENCOUNTER — NON-APPOINTMENT (OUTPATIENT)
Age: 61
End: 2021-06-15

## 2021-06-27 ENCOUNTER — INPATIENT (INPATIENT)
Facility: HOSPITAL | Age: 61
LOS: 2 days | Discharge: HOME | End: 2021-06-30
Attending: INTERNAL MEDICINE | Admitting: INTERNAL MEDICINE
Payer: MEDICAID

## 2021-06-27 VITALS
TEMPERATURE: 98 F | DIASTOLIC BLOOD PRESSURE: 85 MMHG | SYSTOLIC BLOOD PRESSURE: 184 MMHG | RESPIRATION RATE: 18 BRPM | HEIGHT: 64 IN | OXYGEN SATURATION: 100 % | HEART RATE: 82 BPM

## 2021-06-27 DIAGNOSIS — H53.2 DIPLOPIA: ICD-10-CM

## 2021-06-27 LAB
ALBUMIN SERPL ELPH-MCNC: 4.2 G/DL — SIGNIFICANT CHANGE UP (ref 3.3–5)
ALP SERPL-CCNC: 190 U/L — HIGH (ref 40–120)
ALT FLD-CCNC: 9 U/L — SIGNIFICANT CHANGE UP (ref 4–33)
ANION GAP SERPL CALC-SCNC: 11 MMOL/L — SIGNIFICANT CHANGE UP (ref 7–14)
AST SERPL-CCNC: 18 U/L — SIGNIFICANT CHANGE UP (ref 4–32)
BASOPHILS # BLD AUTO: 0.02 K/UL — SIGNIFICANT CHANGE UP (ref 0–0.2)
BASOPHILS NFR BLD AUTO: 0.2 % — SIGNIFICANT CHANGE UP (ref 0–2)
BILIRUB SERPL-MCNC: 0.2 MG/DL — SIGNIFICANT CHANGE UP (ref 0.2–1.2)
BUN SERPL-MCNC: 12 MG/DL — SIGNIFICANT CHANGE UP (ref 7–23)
CALCIUM SERPL-MCNC: 9.5 MG/DL — SIGNIFICANT CHANGE UP (ref 8.4–10.5)
CHLORIDE SERPL-SCNC: 105 MMOL/L — SIGNIFICANT CHANGE UP (ref 98–107)
CO2 SERPL-SCNC: 28 MMOL/L — SIGNIFICANT CHANGE UP (ref 22–31)
CREAT SERPL-MCNC: 0.72 MG/DL — SIGNIFICANT CHANGE UP (ref 0.5–1.3)
EOSINOPHIL # BLD AUTO: 0.06 K/UL — SIGNIFICANT CHANGE UP (ref 0–0.5)
EOSINOPHIL NFR BLD AUTO: 0.5 % — SIGNIFICANT CHANGE UP (ref 0–6)
GLUCOSE SERPL-MCNC: 178 MG/DL — HIGH (ref 70–99)
HCT VFR BLD CALC: 44.8 % — SIGNIFICANT CHANGE UP (ref 34.5–45)
HGB BLD-MCNC: 13.8 G/DL — SIGNIFICANT CHANGE UP (ref 11.5–15.5)
IANC: 8.67 K/UL — HIGH (ref 1.5–8.5)
IMM GRANULOCYTES NFR BLD AUTO: 0.4 % — SIGNIFICANT CHANGE UP (ref 0–1.5)
LYMPHOCYTES # BLD AUTO: 2.87 K/UL — SIGNIFICANT CHANGE UP (ref 1–3.3)
LYMPHOCYTES # BLD AUTO: 23 % — SIGNIFICANT CHANGE UP (ref 13–44)
MCHC RBC-ENTMCNC: 26.1 PG — LOW (ref 27–34)
MCHC RBC-ENTMCNC: 30.8 GM/DL — LOW (ref 32–36)
MCV RBC AUTO: 84.7 FL — SIGNIFICANT CHANGE UP (ref 80–100)
MONOCYTES # BLD AUTO: 0.8 K/UL — SIGNIFICANT CHANGE UP (ref 0–0.9)
MONOCYTES NFR BLD AUTO: 6.4 % — SIGNIFICANT CHANGE UP (ref 2–14)
NEUTROPHILS # BLD AUTO: 8.67 K/UL — HIGH (ref 1.8–7.4)
NEUTROPHILS NFR BLD AUTO: 69.5 % — SIGNIFICANT CHANGE UP (ref 43–77)
NRBC # BLD: 0 /100 WBCS — SIGNIFICANT CHANGE UP
NRBC # FLD: 0 K/UL — SIGNIFICANT CHANGE UP
PLATELET # BLD AUTO: 399 K/UL — SIGNIFICANT CHANGE UP (ref 150–400)
POTASSIUM SERPL-MCNC: 5 MMOL/L — SIGNIFICANT CHANGE UP (ref 3.5–5.3)
POTASSIUM SERPL-SCNC: 5 MMOL/L — SIGNIFICANT CHANGE UP (ref 3.5–5.3)
PROT SERPL-MCNC: 7.6 G/DL — SIGNIFICANT CHANGE UP (ref 6–8.3)
RBC # BLD: 5.29 M/UL — HIGH (ref 3.8–5.2)
RBC # FLD: 13.5 % — SIGNIFICANT CHANGE UP (ref 10.3–14.5)
SARS-COV-2 RNA SPEC QL NAA+PROBE: SIGNIFICANT CHANGE UP
SODIUM SERPL-SCNC: 144 MMOL/L — SIGNIFICANT CHANGE UP (ref 135–145)
TROPONIN T, HIGH SENSITIVITY RESULT: 9 NG/L — SIGNIFICANT CHANGE UP
TROPONIN T, HIGH SENSITIVITY RESULT: 9 NG/L — SIGNIFICANT CHANGE UP
WBC # BLD: 12.47 K/UL — HIGH (ref 3.8–10.5)
WBC # FLD AUTO: 12.47 K/UL — HIGH (ref 3.8–10.5)

## 2021-06-27 PROCEDURE — 70498 CT ANGIOGRAPHY NECK: CPT | Mod: 26

## 2021-06-27 PROCEDURE — 99223 1ST HOSP IP/OBS HIGH 75: CPT

## 2021-06-27 PROCEDURE — 99285 EMERGENCY DEPT VISIT HI MDM: CPT

## 2021-06-27 PROCEDURE — 71046 X-RAY EXAM CHEST 2 VIEWS: CPT | Mod: 26

## 2021-06-27 PROCEDURE — 70450 CT HEAD/BRAIN W/O DYE: CPT | Mod: 26,59

## 2021-06-27 PROCEDURE — 70496 CT ANGIOGRAPHY HEAD: CPT | Mod: 26

## 2021-06-27 RX ORDER — SODIUM CHLORIDE 9 MG/ML
3 INJECTION INTRAMUSCULAR; INTRAVENOUS; SUBCUTANEOUS EVERY 8 HOURS
Refills: 0 | Status: DISCONTINUED | OUTPATIENT
Start: 2021-06-27 | End: 2021-06-30

## 2021-06-27 NOTE — H&P ADULT - ATTENDING COMMENTS
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 Neurology consult obtained. Ophthalmology consult pending. Differential broad, including microvascular mononeuropathy vs demyelinating disorder vs CNS mass or lesion vs acute ischemic CVA of brainstem. However, can also be in setting of thyroid disease given recent finding of a possible autonomously functioning thyroid nodule. Endocrine consult pending for possible FNA biopsy.

## 2021-06-27 NOTE — H&P ADULT - PROBLEM SELECTOR PLAN 5
Per Neuro goal of SBP <180 with gradual normotension.  Will continue metoprolol succinate 50 mg daily.  DASH/TLC diet ordered. - Start atorvastatin 80 mg qhs as above  - F/u lipid profile Per Neurology, goal of SBP <180 with gradual normotension.  - C/w metoprolol succinate 50 mg daily

## 2021-06-27 NOTE — H&P ADULT - PROBLEM SELECTOR PLAN 6
Lovenox 40 mg subcutaneous daily. Per Neurology, goal of SBP <180 with gradual normotension.  - C/w metoprolol succinate 50 mg daily - Start atorvastatin 80 mg qhs as above  - F/u lipid profile

## 2021-06-27 NOTE — H&P ADULT - ASSESSMENT
61 y/o F with PMH of HTN, HLD and type 2 diabetes presents to the ED complaining of double vision and dizziness. 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

## 2021-06-27 NOTE — ED PROVIDER NOTE - PROGRESS NOTE DETAILS
Blaise, PGY2 - D/w neuro, recommending MRI brain/orbits with and without contrast to evaluation for demyelination. No CDU beds available.

## 2021-06-27 NOTE — ED PROVIDER NOTE - NS ED ROS FT
General: Denies fevers  Eyes: L eye double vision, L eye deviation  CV: Denies chest pain  Resp: Denies SOB  GI: +Diarrhea. Denies abdominal pain, nausea, vomiting  : Denies painful urination  Skin: Denies new rashes  Neuro: +Dizziness, bifrontal headache. No focal weakness  MSK: Denies recent trauma

## 2021-06-27 NOTE — ED ADULT NURSE NOTE - NSIMPLEMENTINTERV_GEN_ALL_ED
Implemented All Universal Safety Interventions:  [place] to call system. Call bell, personal items and telephone within reach. Instruct patient to call for assistance. Room bathroom lighting operational. Non-slip footwear when patient is off stretcher. Physically safe environment: no spills, clutter or unnecessary equipment. Stretcher in lowest position, wheels locked, appropriate side rails in place.

## 2021-06-27 NOTE — ED PROVIDER NOTE - OBJECTIVE STATEMENT
61 yo [place]ian creole speaking F with PMH of HTN and T2DM p/w L eye vision changes, dizziness, and mild HA. Last known normal was 2 nights ago. Pt woke up yesterday morning with L eye double vision, mild dizziness, and BL frontal headache. No falls/trauma, not on blood thinners. Son also noticed pt's L eye was deviating towards her nose. Pt's BP also noted to be elevated to SBP 180s, reports compliance with meds. Pt had same constellation of sx 6 months ago, was hospitalized, CTA neck with minimal plaque in prox R ICA, CTA head with 1mm ACOM aneurysm, MR head WNL. Pt seen at  and found to have "horizontal nystagmus," was referred to ED.

## 2021-06-27 NOTE — ED PROVIDER NOTE - CLINICAL SUMMARY MEDICAL DECISION MAKING FREE TEXT BOX
Blaise, PGY2 - 60F p/w L eye double vision, "eye deviation," dizziness, bifrontal cao, LKN 2 days ago, similar sx in 6mo ago, had neuro w/u with CT/CTA/MR. Few beats of horizontal nystagmus BL, neuro exam otherwise normal. DDx includes hypertensive urgency, less likely CVA, migraine. Plan: labs, CTH, cxr, ekg, reeval

## 2021-06-27 NOTE — H&P ADULT - PROBLEM SELECTOR PROBLEM 4
Hyperlipidemia Type 2 diabetes mellitus Type 2 diabetes mellitus with hyperglycemia, with long-term current use of insulin

## 2021-06-27 NOTE — ED PROVIDER NOTE - PHYSICAL EXAMINATION
I have reviewed the triage vital signs.  Const: AAOx3, in NAD  Eyes: no conjunctival injection, ~3 beats horizontal nystagmus BL  HENT: NCAT, Neck supple, oral mucosa moist  CV: RRR, +S1, S2  Resp: CTAB, no respiratory distress  GI: Abdomen soft, NTND, no guarding  Extremities: No peripheral edema  Skin: Warm, well perfused, no rash  MSK: No gross deformities appreciated  Neuro: CN2-12 grossly intact, MS +5/5 in UE and LE BL, finger to nose smooth and rapid, gross sensation intact in UE and LE BL, gait smooth and coordinated, negative rhomberg, negative pronator drift   Psych: Appropriate mood and affect

## 2021-06-27 NOTE — H&P ADULT - NSHPLABSRESULTS_GEN_ALL_CORE
Vital Signs Last 24 Hrs  T(C): 37.1 (27 Jun 2021 21:41), Max: 37.1 (27 Jun 2021 21:41)  T(F): 98.7 (27 Jun 2021 21:41), Max: 98.7 (27 Jun 2021 21:41)  HR: 76 (27 Jun 2021 21:41) (73 - 82)  BP: 174/82 (27 Jun 2021 21:41) (174/82 - 184/85)  BP(mean): --  RR: 17 (27 Jun 2021 21:41) (16 - 18)  SpO2: 100% (27 Jun 2021 21:41) (98% - 100%)                          13.8   12.47 )-----------( 399      ( 27 Jun 2021 16:03 )             44.8     06-27    144  |  105  |  12  ----------------------------<  178<H>  5.0   |  28  |  0.72    Ca    9.5      27 Jun 2021 16:06  TPro  7.6  /  Alb  4.2  /  TBili  0.2  /  DBili  x   /  AST  18  /  ALT  9   /  AlkPhos  190<H>  06-27    Troponin 9-->9.    COVID 19 PCR: Negative.    CTA Head and Neck:  FINDINGS:    CTA NECK AND BRAIN:  GREAT VESSELS: Bovine aortic arch.  COMMON CAROTID ARTERIES: Patent without stenosis.  CAROTID BIFURCATIONS: Within normal limits.  INTERNAL CAROTID ARTERIES: Evaluation of the both distal common carotid, proximal internal and external carotid arteries appear normal.  VERTEBRAL ARTERIES: Patent without stenosis or dissection.    ANTERIOR CIRCULATION: Patent anterior cerebral and middle cerebral arteries without stenosis or aneurysm. Hypoplastic right A1 segment is seen.  POSTERIOR CIRCULATION: Patent distal vertebral, basilar, and bilateral posterior cerebral arteries without stenosis or aneurysm.    VISUALIZED LUNGS: Within normal limits.  BONES: Degenerative changes.  SOFT TISSUE: Multinodular goiter with calcifications.    IMPRESSION:    No vessel occlusion.    CT Head:  Findings:  The lateral ventricles have a normal configuration.    There is no evidence of acute hemorrhage, mass or mass-effect in the posterior fossa or in the supratentorial region.    Evaluation of the osseous structures with the appropriate window appears unremarkable.    The visualized paranasal sinuses mastoid and middle ear regions appear clear.    Impression:  Unremarkable noncontrast head CT.    CXR:   FINDINGS:    The lungs are clear.  No pleural effusion or pneumothorax.  Heart size is unremarkable.  No acute abnormality within visible osseous structures.    IMPRESSION:  Clear lungs.    EKG 1: Sinus rhythm at 82 bpm. QT/QTc 468/546  EKG 2: Sinus rhythm at 68 bpm with 1st degree AV block. QT/QTc 404/429 13.8   12.47 )-----------( 399      ( 27 Jun 2021 16:03 )             44.8     06-27    144  |  105  |  12  ----------------------------<  178<H>  5.0   |  28  |  0.72    Ca    9.5      27 Jun 2021 16:06  TPro  7.6  /  Alb  4.2  /  TBili  0.2  /  DBili  x   /  AST  18  /  ALT  9   /  AlkPhos  190<H>  06-27    Troponin 9-->9.    COVID 19 PCR: Negative.    Imaging personally reviewed.  CTA Head and Neck:  FINDINGS:    CTA NECK AND BRAIN:  GREAT VESSELS: Bovine aortic arch.  COMMON CAROTID ARTERIES: Patent without stenosis.  CAROTID BIFURCATIONS: Within normal limits.  INTERNAL CAROTID ARTERIES: Evaluation of the both distal common carotid, proximal internal and external carotid arteries appear normal.  VERTEBRAL ARTERIES: Patent without stenosis or dissection.    ANTERIOR CIRCULATION: Patent anterior cerebral and middle cerebral arteries without stenosis or aneurysm. Hypoplastic right A1 segment is seen.  POSTERIOR CIRCULATION: Patent distal vertebral, basilar, and bilateral posterior cerebral arteries without stenosis or aneurysm.    VISUALIZED LUNGS: Within normal limits.  BONES: Degenerative changes.  SOFT TISSUE: Multinodular goiter with calcifications.    IMPRESSION:  No vessel occlusion.    CT Head:  Findings:  The lateral ventricles have a normal configuration.    There is no evidence of acute hemorrhage, mass or mass-effect in the posterior fossa or in the supratentorial region.    Evaluation of the osseous structures with the appropriate window appears unremarkable.    The visualized paranasal sinuses mastoid and middle ear regions appear clear.    Impression:  Unremarkable noncontrast head CT.    CXR:   FINDINGS:  The lungs are clear.  No pleural effusion or pneumothorax.  Heart size is unremarkable.  No acute abnormality within visible osseous structures.    IMPRESSION:  Clear lungs.    EKG personally reviewed.  EKG 1: Sinus rhythm at 82 bpm. QT/QTc 468/546 ms  EKG 2: Sinus rhythm at 68 bpm with 1st degree AV block. QT/QTc 404/429 ms

## 2021-06-27 NOTE — ED ADULT NURSE NOTE - OBJECTIVE STATEMENT
Pt. received in room 13, A/O x4, ambulatory, c/o left eye deviation, double vision and headache. PMH of HTN and DM. Pt.'s son states he noticed her left eye drifting two days ago. Denies chest pain, SOB, N/V, facial drooping and difficulty speaking. Endorses headache and double vision in left eye. Strength 5/5 bilaterally. 20 gauge IV placed in right AC. Patient placed on cardiac monitor. Son at bedside.

## 2021-06-27 NOTE — H&P ADULT - PROBLEM SELECTOR PLAN 4
Lipid level in am.  Atorvastatin 80 mg ordered. Pt on Lantus 12 units qhs and Prandin at home.  - Will lower Lantus dose to 9 units qhs for now   - Start low-dose ISS and FS checks qAC and qhs  - F/u A1c  - Endocrine consult to be called in AM

## 2021-06-27 NOTE — ED PROVIDER NOTE - ATTENDING CONTRIBUTION TO CARE
Attending Statement: I have personally seen and examined this patient. I have fully participated in the care of this patient. 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 No fall. Endorse feels "weak and unsteady" no focal numbness or weakness. no slurred speech. Endorse double vision  of left eye. Pt had same constellation of sx 6 months ago, was hospitalized, CTA neck with minimal plaque in prox R ICA, CTA head with 1mm ACOM aneurysm, MR head WNL.  Denies cp/sob Denies abdominal pain. no n/v/d   Vital signs noted. Sitting up, no distress. EOMI. no facial asymmetry no slurred speech. neg pronator drift. nl  bl hands. nl finger to nose. nl gait. normal S1-S2 good air entry. nt abdomen.   plan labs, ekg, ct head, re assess

## 2021-06-27 NOTE — H&P ADULT - NEGATIVE MUSCULOSKELETAL SYMPTOMS
no arthralgia/no arthritis no arthralgia/no arthritis/no myalgia/no muscle weakness/no back pain/no leg pain L/no leg pain R

## 2021-06-27 NOTE — ED ADULT TRIAGE NOTE - CHIEF COMPLAINT QUOTE
Pt sent from urgent care for r/o CVA, was noted to have horizontal nystagmus on exam. Pt with history of same symptoms in January and was diagnosed with "small stroke". Pt states that her BP increases, and then she gets double vision. Pt's symptoms started yesterday at 9am. Denies headache, c/o dizziness. Due to onset of symptoms, no code stroke called.

## 2021-06-27 NOTE — H&P ADULT - NEGATIVE OPHTHALMOLOGIC SYMPTOMS
no discharge L/no discharge R no photophobia/no lacrimation L/no lacrimation R/no blurred vision R/no discharge L/no discharge R/no pain L/no pain R/no loss of vision L/no loss of vision R

## 2021-06-27 NOTE — H&P ADULT - PROBLEM SELECTOR PLAN 1
Admit to tele, continue monitoring.  Neuro recs appreciated.  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  Neuro recommending MRI brain and orbits w/w/o contrast.  Tele monitoring  Start ASA 81 mg PO daily, to be reassessed pending MRI results.  Start atorvastatin 80 mg daily, can be titrated to LDL <70.  Monitor CBC, BMP.  A1c, LDL.  SBP goal <180 with gradual normotension.  BG <180 to avoid hypoglycemia.  DVT ppx with Admit to tele, continue monitoring.  Neuro recs appreciated.  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  Neuro recommending MRI brain and orbits w/w/o contrast.  Tele monitoring  Start ASA 81 mg PO daily, to be reassessed pending MRI results.  Start atorvastatin 80 mg daily, can be titrated to LDL <70.  Monitor CBC, BMP.  A1c, LDL.  SBP goal <180 with gradual normotension.  BG <180 to avoid hypoglycemia.  Ophthalmology consult to be called in AM - Neurology consulted, recs appreciated. 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  - Can also be due to thyroid disease (thyroid-associated ophthalmopathy). TSH low on admission. PUENTE uptake scan on 6/9/21 with possible autonomously functioning nodule. Repeat US thyroid/parathyroid to evaluate nodule and for possible FNA biopsy. F/u free T4 and total T3. Will obtain Endocrine consult in AM.  - MRI brain and orbits w/w/o contrast ordered  - Start ASA 81 mg PO daily, Neurology to reassess pending MRI results.  - Start atorvastatin 80 mg daily, can be titrated to LDL <70  - Check A1c and lipid profile  - SBP goal <180 with gradual normotension  - Plan for DM as below for better glycemic control  - Ophthalmology consult to be called in AM  - Monitor on tele

## 2021-06-27 NOTE — H&P ADULT - PROBLEM SELECTOR PLAN 2
WBC count of 12.47.  Patient afebrile.  Will order urinalysis.  Continue to trend. TSH low on admission. PUENTE uptake scan on 6/9/21 with possible autonomously functioning nodule.   - Repeat US thyroid/parathyroid to evaluate nodule and for possible FNA biopsy  - F/u free T4 and total T3  - Will obtain Endocrine consult in AM

## 2021-06-27 NOTE — CONSULT NOTE ADULT - ASSESSMENT
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 Patient states that when she awoke she noticed horizontal diplopia, which improves when she closes one eye, and is similar to the episode she had in January. Physical exam remarkable for CN 6 palsy OS. Labs remarkable for mild leukocytosis- 12.47 and hyperglycemia- 178. CTH shows no acute pathology. CTA shows no LVO or aneurysm, but shows a multinodular goiter with calcifications.    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 r/o demyelinating disorder vs. CNS mass or lesion vs. acute ischemic CVA of the brainstem which would likely be due to small vessel disease.    Recommendations:  [] MRI brain and orbits w/w/o contrast  [] Telemetry monitoring  [] Start ASA 81mg PO daily, to be reassessed pending MRI results  [] Start atorvastatin 80mg daily, to be titrated to LDL <70  [] Monitor CBC, BMP  [] A1C  [] LDL  [] SBP goal <180 with gradual normotension  [] BG <180, avoid hypoglycemia  [] DVT ppx    Case to be seen and discussed with stroke attending Dr. Libman.

## 2021-06-27 NOTE — H&P ADULT - NEGATIVE NEUROLOGICAL SYMPTOMS
no weakness/no syncope/no vertigo no weakness/no paresthesias/no syncope/no vertigo/no loss of sensation/no difficulty walking/no headache/no loss of consciousness/no confusion

## 2021-06-27 NOTE — CONSULT NOTE ADULT - SUBJECTIVE AND OBJECTIVE BOX
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 Patient states that when she awoke she noticed horizontal diplopia, which improves when she closes one eye, and is similar to the episode she had in January. Additionally she had a bifrontal headache with dizziness which have resolved. She also admits her blood pressure is high which was similar in January. She reports the double vision is the same both near or far, and is not worse in any particular direction. She denies recent changes in medications, reports adherence with medications for glucose and bp control, and is not currently taking aspirin. She denies vision loss, focal weakness, numbness, nausea, vomiting, dizziness or gait instability, recent fever, illness, or trauma.    Review of Systems:  As states in HPI.        PMHx:     Type 2 diabetes mellitus    Hypertension      PFHx:   FH: hypertension    FH: type 2 diabetes      PSuHx:   No significant past surgical history      Vitals:  T(C): 37 (06-27-21 @ 19:05), Max: 37 (06-27-21 @ 19:05)  HR: 75 (06-27-21 @ 19:05) (73 - 82)  BP: 179/92 (06-27-21 @ 19:05) (177/86 - 184/85)  RR: 18 (06-27-21 @ 19:05) (16 - 18)  SpO2: 98% (06-27-21 @ 19:05) (98% - 100%)    Physical Examination:  Neurologic:  - Mental Status: Alert, awake, oriented to person, place, and time; Speech is fluent with intact naming, repetition, and comprehension; Grossly follows all commands.  - Cranial Nerves:  II: Visual fields are full to confrontation; Pupils are equal, round, and reactive to light;  III, IV, VI: Extraocular movements show esotropia with limited abduction OS. No nystagmus.  V: Facial sensation is intact in the V1-V3 distribution bilaterally.  VII: Face is symmetric with normal eye closure and smile.  VIII: Hearing is intact to finger rub.  IX, X: Uvula is midline and soft palate rises symmetrically.  XI: Head turning and shoulder shrug are intact.  XII: Tongue protrudes in the midline.  - Motor: Strength is 5/5 throughout. There is no pronator drift.  - Reflexes: 2+ and symmetric at the biceps, brachioradialis, knees, and 1+ ankles. Plantar responses down bilaterally.  - Sensory: Intact throughout to light touch  - Coordination: Finger-nose-finger intact without dysmetria. Rapid alternating hand movements intact.  - Gait: Normal steps, base, arm swing, and turning. Romberg testing is negative.    Labs:                        13.8   12.47 )-----------( 399      ( 27 Jun 2021 16:03 )             44.8     06-27    144  |  105  |  12  ----------------------------<  178<H>  5.0   |  28  |  0.72    Ca    9.5      27 Jun 2021 16:06    TPro  7.6  /  Alb  4.2  /  TBili  0.2  /  DBili  x   /  AST  18  /  ALT  9   /  AlkPhos  190<H>  06-27    CAPILLARY BLOOD GLUCOSE        LIVER FUNCTIONS - ( 27 Jun 2021 16:06 )  Alb: 4.2 g/dL / Pro: 7.6 g/dL / ALK PHOS: 190 U/L / ALT: 9 U/L / AST: 18 U/L / GGT: x                 Radiology:  CT Head No Cont:  (27 Jun 2021 16:38)  Impression:  Unremarkable noncontrast head CT.    CT Angio Neck w/ IV Cont (06.27.21 @ 18:20)  IMPRESSION:    No vessel occlusion.

## 2021-06-27 NOTE — H&P ADULT - NSHPPHYSICALEXAM_GEN_ALL_CORE
Vital Signs Last 24 Hrs  T(C): 37.1 (27 Jun 2021 21:41), Max: 37.1 (27 Jun 2021 21:41)  T(F): 98.7 (27 Jun 2021 21:41), Max: 98.7 (27 Jun 2021 21:41)  HR: 76 (27 Jun 2021 21:41) (73 - 82)  BP: 174/82 (27 Jun 2021 21:41) (174/82 - 184/85)  BP(mean): --  RR: 17 (27 Jun 2021 21:41) (16 - 18)  SpO2: 100% (27 Jun 2021 21:41) (98% - 100%)

## 2021-06-27 NOTE — H&P ADULT - HISTORY OF PRESENT ILLNESS
59 y/o F with PMH of HTN, HLD and type 2 diabetes presents to the ED complaining of double vision and dizziness. Patient states that yesterday around 10 AM when walking her vision became blurry and doubled. Patient endorses having dizziness, which she describes as unsteadiness. Patient denies falls and trauma. Patient endorses compliance with most of her medications. Currently dizziness as resolves. Patient denies any numbness and weakness in her extremities, fever, chills, chest pain, shortness of breath. Per son patient did not have any slurred speech or facial droop. Patient was admitted in January 2021 for similar symptoms. 59 y/o F with PMH of HTN, HLD and type 2 diabetes presents to the ED complaining of double vision and dizziness. Patient states that yesterday around 10 AM when walking her vision became blurry and doubled. Patient endorses having dizziness, which she describes as unsteadiness. Patient denies falls and trauma. Patient endorses compliance with most of her medications. Currently dizziness as resolves. Patient denies any numbness and weakness in her extremities, fever, chills, chest pain, shortness of breath. Per son patient did not have any slurred speech or facial droop. Patient was admitted in January 2021 for similar symptoms. Patient denies difficulty swallowing. 59 y/o F, Chinese Creole speaking, with PMH of HTN, HLD, and type 2 DM presents to the ED complaining of double vision and dizziness. Patient states that yesterday around 10 AM when walking her vision became blurry and doubled. Patient endorses having dizziness, which she describes as unsteadiness. Patient denies falls and trauma. Patient endorses compliance with most of her medications. Currently dizziness as resolves. Patient denies any numbness and weakness in her extremities, fever, chills, chest pain, shortness of breath. Per son patient did not have any slurred speech or facial droop. Patient was admitted in January 2021 for similar symptoms. Patient denies difficulty swallowing. 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 Patient states that yesterday around 10 AM while walking, she started experiencing blurry vision of her L eye, then double vision of that eye. When she closes her L eye, the double vision disappears. Patient reports also having dizziness, which she describes as unsteadiness. Patient denies any falls or trauma. Patient states compliance with most of her medications. Currently, dizziness has resolved. Patient denies any numbness or weakness in her extremities, dysphagia, headaches, fevers, chills, chest pain, shortness of breath, abdominal pain, nausea, diarrhea, constipation, or urinary symptoms. Per son, patient did not have any slurred speech or facial droop. Patient was admitted in January 2021 for similar symptoms, attributed to hypertensive urgency at the time.

## 2021-06-27 NOTE — H&P ADULT - RS GEN PE MLT RESP DETAILS PC
breath sounds equal/respirations non-labored/clear to auscultation bilaterally breath sounds equal/respirations non-labored/clear to auscultation bilaterally/no rales/no rhonchi/no wheezes

## 2021-06-27 NOTE — H&P ADULT - NSHPSOCIALHISTORY_GEN_ALL_CORE
Patient denies any use of cigarettes and alcohol. Patient lives with her son. Patient denies any use of tobacco, alcohol, or illicit drug use. Patient lives with her son.

## 2021-06-28 DIAGNOSIS — Z29.9 ENCOUNTER FOR PROPHYLACTIC MEASURES, UNSPECIFIED: ICD-10-CM

## 2021-06-28 DIAGNOSIS — D72.829 ELEVATED WHITE BLOOD CELL COUNT, UNSPECIFIED: ICD-10-CM

## 2021-06-28 DIAGNOSIS — E11.65 TYPE 2 DIABETES MELLITUS WITH HYPERGLYCEMIA: ICD-10-CM

## 2021-06-28 DIAGNOSIS — E11.9 TYPE 2 DIABETES MELLITUS WITHOUT COMPLICATIONS: ICD-10-CM

## 2021-06-28 DIAGNOSIS — I10 ESSENTIAL (PRIMARY) HYPERTENSION: ICD-10-CM

## 2021-06-28 DIAGNOSIS — H53.2 DIPLOPIA: ICD-10-CM

## 2021-06-28 DIAGNOSIS — E78.5 HYPERLIPIDEMIA, UNSPECIFIED: ICD-10-CM

## 2021-06-28 DIAGNOSIS — E04.1 NONTOXIC SINGLE THYROID NODULE: ICD-10-CM

## 2021-06-28 LAB
A1C WITH ESTIMATED AVERAGE GLUCOSE RESULT: 8.3 % — HIGH (ref 4–5.6)
ANION GAP SERPL CALC-SCNC: 17 MMOL/L — HIGH (ref 7–14)
BASOPHILS # BLD AUTO: 0.02 K/UL — SIGNIFICANT CHANGE UP (ref 0–0.2)
BASOPHILS NFR BLD AUTO: 0.2 % — SIGNIFICANT CHANGE UP (ref 0–2)
BUN SERPL-MCNC: 10 MG/DL — SIGNIFICANT CHANGE UP (ref 7–23)
CALCIUM SERPL-MCNC: 9.2 MG/DL — SIGNIFICANT CHANGE UP (ref 8.4–10.5)
CHLORIDE SERPL-SCNC: 102 MMOL/L — SIGNIFICANT CHANGE UP (ref 98–107)
CHOLEST SERPL-MCNC: 185 MG/DL — SIGNIFICANT CHANGE UP
CO2 SERPL-SCNC: 24 MMOL/L — SIGNIFICANT CHANGE UP (ref 22–31)
CREAT SERPL-MCNC: 0.7 MG/DL — SIGNIFICANT CHANGE UP (ref 0.5–1.3)
EOSINOPHIL # BLD AUTO: 0.08 K/UL — SIGNIFICANT CHANGE UP (ref 0–0.5)
EOSINOPHIL NFR BLD AUTO: 0.8 % — SIGNIFICANT CHANGE UP (ref 0–6)
ESTIMATED AVERAGE GLUCOSE: 192 MG/DL — HIGH (ref 68–114)
GLUCOSE BLDC GLUCOMTR-MCNC: 110 MG/DL — HIGH (ref 70–99)
GLUCOSE BLDC GLUCOMTR-MCNC: 120 MG/DL — HIGH (ref 70–99)
GLUCOSE BLDC GLUCOMTR-MCNC: 97 MG/DL — SIGNIFICANT CHANGE UP (ref 70–99)
GLUCOSE SERPL-MCNC: 101 MG/DL — HIGH (ref 70–99)
HCT VFR BLD CALC: 43 % — SIGNIFICANT CHANGE UP (ref 34.5–45)
HDLC SERPL-MCNC: 33 MG/DL — LOW
HGB BLD-MCNC: 13.4 G/DL — SIGNIFICANT CHANGE UP (ref 11.5–15.5)
IANC: 6.32 K/UL — SIGNIFICANT CHANGE UP (ref 1.5–8.5)
IMM GRANULOCYTES NFR BLD AUTO: 0.3 % — SIGNIFICANT CHANGE UP (ref 0–1.5)
LIPID PNL WITH DIRECT LDL SERPL: 132 MG/DL — HIGH
LYMPHOCYTES # BLD AUTO: 3.12 K/UL — SIGNIFICANT CHANGE UP (ref 1–3.3)
LYMPHOCYTES # BLD AUTO: 30.4 % — SIGNIFICANT CHANGE UP (ref 13–44)
MAGNESIUM SERPL-MCNC: 2.1 MG/DL — SIGNIFICANT CHANGE UP (ref 1.6–2.6)
MCHC RBC-ENTMCNC: 26.1 PG — LOW (ref 27–34)
MCHC RBC-ENTMCNC: 31.2 GM/DL — LOW (ref 32–36)
MCV RBC AUTO: 83.8 FL — SIGNIFICANT CHANGE UP (ref 80–100)
MONOCYTES # BLD AUTO: 0.69 K/UL — SIGNIFICANT CHANGE UP (ref 0–0.9)
MONOCYTES NFR BLD AUTO: 6.7 % — SIGNIFICANT CHANGE UP (ref 2–14)
NEUTROPHILS # BLD AUTO: 6.32 K/UL — SIGNIFICANT CHANGE UP (ref 1.8–7.4)
NEUTROPHILS NFR BLD AUTO: 61.6 % — SIGNIFICANT CHANGE UP (ref 43–77)
NON HDL CHOLESTEROL: 152 MG/DL — HIGH
NRBC # BLD: 0 /100 WBCS — SIGNIFICANT CHANGE UP
NRBC # FLD: 0 K/UL — SIGNIFICANT CHANGE UP
PHOSPHATE SERPL-MCNC: 4.3 MG/DL — SIGNIFICANT CHANGE UP (ref 2.5–4.5)
PLATELET # BLD AUTO: 369 K/UL — SIGNIFICANT CHANGE UP (ref 150–400)
POTASSIUM SERPL-MCNC: 4.1 MMOL/L — SIGNIFICANT CHANGE UP (ref 3.5–5.3)
POTASSIUM SERPL-SCNC: 4.1 MMOL/L — SIGNIFICANT CHANGE UP (ref 3.5–5.3)
RBC # BLD: 5.13 M/UL — SIGNIFICANT CHANGE UP (ref 3.8–5.2)
RBC # FLD: 13.4 % — SIGNIFICANT CHANGE UP (ref 10.3–14.5)
SODIUM SERPL-SCNC: 143 MMOL/L — SIGNIFICANT CHANGE UP (ref 135–145)
T3 SERPL-MCNC: 126 NG/DL — SIGNIFICANT CHANGE UP (ref 80–200)
T4 AB SER-ACNC: 7.45 UG/DL — SIGNIFICANT CHANGE UP (ref 5.1–13)
T4 FREE SERPL-MCNC: 1.3 NG/DL — SIGNIFICANT CHANGE UP (ref 0.9–1.8)
TRIGL SERPL-MCNC: 102 MG/DL — SIGNIFICANT CHANGE UP
TSH SERPL-MCNC: 0.16 UIU/ML — LOW (ref 0.27–4.2)
WBC # BLD: 10.26 K/UL — SIGNIFICANT CHANGE UP (ref 3.8–10.5)
WBC # FLD AUTO: 10.26 K/UL — SIGNIFICANT CHANGE UP (ref 3.8–10.5)

## 2021-06-28 PROCEDURE — 99221 1ST HOSP IP/OBS SF/LOW 40: CPT

## 2021-06-28 PROCEDURE — 99223 1ST HOSP IP/OBS HIGH 75: CPT

## 2021-06-28 PROCEDURE — 76536 US EXAM OF HEAD AND NECK: CPT | Mod: 26

## 2021-06-28 PROCEDURE — 99222 1ST HOSP IP/OBS MODERATE 55: CPT

## 2021-06-28 RX ORDER — INSULIN GLARGINE 100 [IU]/ML
9 INJECTION, SOLUTION SUBCUTANEOUS AT BEDTIME
Refills: 0 | Status: DISCONTINUED | OUTPATIENT
Start: 2021-06-28 | End: 2021-06-28

## 2021-06-28 RX ORDER — ATORVASTATIN CALCIUM 80 MG/1
80 TABLET, FILM COATED ORAL AT BEDTIME
Refills: 0 | Status: DISCONTINUED | OUTPATIENT
Start: 2021-06-28 | End: 2021-06-30

## 2021-06-28 RX ORDER — INSULIN LISPRO 100/ML
VIAL (ML) SUBCUTANEOUS
Refills: 0 | Status: DISCONTINUED | OUTPATIENT
Start: 2021-06-28 | End: 2021-06-30

## 2021-06-28 RX ORDER — INSULIN LISPRO 100/ML
VIAL (ML) SUBCUTANEOUS AT BEDTIME
Refills: 0 | Status: DISCONTINUED | OUTPATIENT
Start: 2021-06-28 | End: 2021-06-30

## 2021-06-28 RX ORDER — DEXTROSE 50 % IN WATER 50 %
25 SYRINGE (ML) INTRAVENOUS ONCE
Refills: 0 | Status: DISCONTINUED | OUTPATIENT
Start: 2021-06-28 | End: 2021-06-30

## 2021-06-28 RX ORDER — DEXTROSE 50 % IN WATER 50 %
15 SYRINGE (ML) INTRAVENOUS ONCE
Refills: 0 | Status: DISCONTINUED | OUTPATIENT
Start: 2021-06-28 | End: 2021-06-30

## 2021-06-28 RX ORDER — DEXTROSE 50 % IN WATER 50 %
12.5 SYRINGE (ML) INTRAVENOUS ONCE
Refills: 0 | Status: DISCONTINUED | OUTPATIENT
Start: 2021-06-28 | End: 2021-06-30

## 2021-06-28 RX ORDER — SODIUM CHLORIDE 9 MG/ML
1000 INJECTION, SOLUTION INTRAVENOUS
Refills: 0 | Status: DISCONTINUED | OUTPATIENT
Start: 2021-06-28 | End: 2021-06-30

## 2021-06-28 RX ORDER — GLUCAGON INJECTION, SOLUTION 0.5 MG/.1ML
1 INJECTION, SOLUTION SUBCUTANEOUS ONCE
Refills: 0 | Status: DISCONTINUED | OUTPATIENT
Start: 2021-06-28 | End: 2021-06-30

## 2021-06-28 RX ORDER — METOPROLOL TARTRATE 50 MG
50 TABLET ORAL DAILY
Refills: 0 | Status: DISCONTINUED | OUTPATIENT
Start: 2021-06-28 | End: 2021-06-30

## 2021-06-28 RX ORDER — ENOXAPARIN SODIUM 100 MG/ML
40 INJECTION SUBCUTANEOUS DAILY
Refills: 0 | Status: DISCONTINUED | OUTPATIENT
Start: 2021-06-28 | End: 2021-06-30

## 2021-06-28 RX ORDER — LISINOPRIL 2.5 MG/1
20 TABLET ORAL DAILY
Refills: 0 | Status: DISCONTINUED | OUTPATIENT
Start: 2021-06-28 | End: 2021-06-30

## 2021-06-28 RX ORDER — METOPROLOL TARTRATE 50 MG
50 TABLET ORAL DAILY
Refills: 0 | Status: DISCONTINUED | OUTPATIENT
Start: 2021-06-28 | End: 2021-06-28

## 2021-06-28 RX ORDER — INSULIN GLARGINE 100 [IU]/ML
6 INJECTION, SOLUTION SUBCUTANEOUS AT BEDTIME
Refills: 0 | Status: DISCONTINUED | OUTPATIENT
Start: 2021-06-28 | End: 2021-06-30

## 2021-06-28 RX ORDER — ASPIRIN/CALCIUM CARB/MAGNESIUM 324 MG
81 TABLET ORAL DAILY
Refills: 0 | Status: DISCONTINUED | OUTPATIENT
Start: 2021-06-28 | End: 2021-06-28

## 2021-06-28 RX ORDER — INSULIN GLARGINE 100 [IU]/ML
2 INJECTION, SOLUTION SUBCUTANEOUS ONCE
Refills: 0 | Status: COMPLETED | OUTPATIENT
Start: 2021-06-28 | End: 2021-06-28

## 2021-06-28 RX ADMIN — SODIUM CHLORIDE 3 MILLILITER(S): 9 INJECTION INTRAMUSCULAR; INTRAVENOUS; SUBCUTANEOUS at 22:40

## 2021-06-28 RX ADMIN — SODIUM CHLORIDE 3 MILLILITER(S): 9 INJECTION INTRAMUSCULAR; INTRAVENOUS; SUBCUTANEOUS at 06:19

## 2021-06-28 RX ADMIN — ENOXAPARIN SODIUM 40 MILLIGRAM(S): 100 INJECTION SUBCUTANEOUS at 14:00

## 2021-06-28 RX ADMIN — Medication 1: at 14:00

## 2021-06-28 RX ADMIN — INSULIN GLARGINE 2 UNIT(S): 100 INJECTION, SOLUTION SUBCUTANEOUS at 02:32

## 2021-06-28 RX ADMIN — LISINOPRIL 20 MILLIGRAM(S): 2.5 TABLET ORAL at 14:00

## 2021-06-28 RX ADMIN — SODIUM CHLORIDE 3 MILLILITER(S): 9 INJECTION INTRAMUSCULAR; INTRAVENOUS; SUBCUTANEOUS at 14:09

## 2021-06-28 RX ADMIN — INSULIN GLARGINE 6 UNIT(S): 100 INJECTION, SOLUTION SUBCUTANEOUS at 22:48

## 2021-06-28 RX ADMIN — ATORVASTATIN CALCIUM 80 MILLIGRAM(S): 80 TABLET, FILM COATED ORAL at 22:48

## 2021-06-28 RX ADMIN — Medication 3: at 17:12

## 2021-06-28 RX ADMIN — Medication 50 MILLIGRAM(S): at 06:53

## 2021-06-28 NOTE — CHART NOTE - NSCHARTNOTEFT_GEN_A_CORE
Per telephone discussion w/ Neuro, patient doesn't need to be on aspirin ( will dc order), MRI can be done outpatient, and patient follow with outpatient neuro and opthalmology.

## 2021-06-28 NOTE — PROGRESS NOTE ADULT - SUBJECTIVE AND OBJECTIVE BOX
Name of Patient : DEJON QUEZADA  MRN: [id number]  DATE OF SERVICE: 06-28-21 @ 16:56    Subjective: Patient seen and examined. No new events except as noted.   doing okay   cont ot have dizziness     REVIEW OF SYSTEMS:    CONSTITUTIONAL: No weakness, fevers or chills  EYES/ENT: No visual changes;  No vertigo or throat pain   NECK: No pain or stiffness  RESPIRATORY: No cough, wheezing, hemoptysis; No shortness of breath  CARDIOVASCULAR: No chest pain or palpitations  GASTROINTESTINAL: No abdominal or epigastric pain. No nausea, vomiting, or hematemesis; No diarrhea or constipation. No melena or hematochezia.  GENITOURINARY: No dysuria, frequency or hematuria  NEUROLOGICAL: + dizziness   SKIN: No itching, burning, rashes, or lesions   All other review of systems is negative unless indicated above.    MEDICATIONS:  MEDICATIONS  (STANDING):  atorvastatin 80 milliGRAM(s) Oral at bedtime  dextrose 40% Gel 15 Gram(s) Oral once  dextrose 5%. 1000 milliLiter(s) (50 mL/Hr) IV Continuous <Continuous>  dextrose 5%. 1000 milliLiter(s) (100 mL/Hr) IV Continuous <Continuous>  dextrose 50% Injectable 25 Gram(s) IV Push once  dextrose 50% Injectable 12.5 Gram(s) IV Push once  dextrose 50% Injectable 25 Gram(s) IV Push once  enoxaparin Injectable 40 milliGRAM(s) SubCutaneous daily  glucagon  Injectable 1 milliGRAM(s) IntraMuscular once  insulin glargine Injectable (LANTUS) 9 Unit(s) SubCutaneous at bedtime  insulin lispro (ADMELOG) corrective regimen sliding scale   SubCutaneous three times a day before meals  insulin lispro (ADMELOG) corrective regimen sliding scale   SubCutaneous at bedtime  lisinopril 20 milliGRAM(s) Oral daily  metoprolol succinate ER 50 milliGRAM(s) Oral daily  sodium chloride 0.9% lock flush 3 milliLiter(s) IV Push every 8 hours      PHYSICAL EXAM:  T(C): 36.7 (06-28-21 @ 06:45), Max: 37.1 (06-27-21 @ 21:41)  HR: 81 (06-28-21 @ 14:01) (72 - 81)  BP: 163/78 (06-28-21 @ 14:01) (148/77 - 179/92)  RR: 16 (06-28-21 @ 12:03) (16 - 18)  SpO2: 100% (06-28-21 @ 12:03) (98% - 100%)  Wt(kg): --  I&O's Summary        Appearance: Normal	  HEENT:  PERRLA   Lymphatic: No lymphadenopathy   Cardiovascular: Normal S1 S2, no JVD  Respiratory: normal effort , clear  Gastrointestinal:  Soft, Non-tender  Skin: No rashes,  warm to touch  Psychiatry:  Mood & affect appropriate  Musculuskeletal: No edema      All labs, Imaging and EKGs personally reviewed                             13.4   10.26 )-----------( 369      ( 28 Jun 2021 07:09 )             43.0               06-28    143  |  102  |  10  ----------------------------<  101<H>  4.1   |  24  |  0.70    Ca    9.2      28 Jun 2021 07:09  Phos  4.3     06-28  Mg     2.1     06-28    TPro  7.6  /  Alb  4.2  /  TBili  0.2  /  DBili  x   /  AST  18  /  ALT  9   /  AlkPhos  190<H>  06-27

## 2021-06-28 NOTE — CONSULT NOTE ADULT - ASSESSMENT
Ms. Suzanna Morse is a 60yoF with a PMH of HTN, HLD, DM2 presenting with three days of blurry vision and dizziness. Pt continues to have blurry vision but the dizziness has since resolved. Endocrine consulted for low TSH and thyroid nodule on ultrasound.    Problem 1. Thyroid nodule  The largest nodule measuring 4.1cm is not a candidate for FNA because the nuclear scan shows 37% uptake (hot nodule). The 1.7cm nodule would be a candidate for FNA, but can be done outpatient. Thyroid nodule is unlikely to be the cause of eye symptoms.    Inpatient recommendations:  -F/U on T3 and FT4  -If elevated, may consider starting low dose methimazole for temporary management  -If normal or low, no further management  -Pt was unaware of thyroid nodule, contact family to determine if they are aware    Outpatient recommendations:  -Right sided 1.7cm nodule meets criteria fo FNA, which can be done outpatient  -Endocrinology followup appointment scheduled at [facility] [place] 10am ([phone])    Problem 2. Diabetes Type 2  Diabetes not well controlled with HbA1C 8.3%  -Recommend 6U Lantus qhs  -C/w low dose correctional scale ac meals and low dose correctional scale at bed time  -Fingersticks ac meals and qhs  -Carb consistent diet  -Goal blood glucose 100-180    Problem 3. Hypertension  Goal blood pressure <130/80, currently at 153/70  -Management per primary team   Ms. Suzanna Morse is a 60yoF with a PMH of HTN, HLD, DM2 presenting with three days of blurry vision and dizziness. Pt continues to have blurry vision but the dizziness has since resolved. Endocrine consulted for low TSH and thyroid nodule on ultrasound.    Problem 1. Thyroid nodule  The largest nodule measuring 4.1cm is not a candidate for FNA because the nuclear scan shows 37% uptake (hot nodule). The 1.7cm nodule would be a candidate for FNA, but can be done outpatient. Thyroid nodule is unlikely to be the cause of eye symptoms.    Inpatient recommendations:  -Reduced TSH, clarify subclinical vs clinical hyperthyroidism. F/U on T3 and FT4  -If elevated, may consider starting low dose methimazole for temporary management  -If normal or low, no further management  -Pt was unaware of thyroid nodule, contact family to determine if they are aware    Outpatient recommendations:  -Right sided 1.7cm nodule meets criteria fo FNA, which can be done outpatient  -Endocrinology followup appointment scheduled at [facility] [place] July 13th 10am ([phone])    Problem 2. Diabetes Type 2  Diabetes not well controlled with HbA1C 8.3%  -Recommend 6U Lantus qhs  -C/w low dose correctional scale ac meals and low dose correctional scale at bed time  -Fingersticks ac meals and qhs  -Carb consistent diet  -Goal blood glucose 100-180    Problem 3. Hypertension  Goal blood pressure <130/80, currently at 153/70  -Management per primary team

## 2021-06-28 NOTE — PROGRESS NOTE ADULT - ASSESSMENT
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

## 2021-06-28 NOTE — CONSULT NOTE ADULT - SUBJECTIVE AND OBJECTIVE BOX
NOTE INCOMPLETE/ IN PROGRESS  *Please wait for attending attestation for official recommendations.   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 Patient states that yesterday around 10 AM while walking, she started experiencing blurry vision of her L eye, then double vision of that eye. When she closes her L eye, the double vision disappears. Patient reports also having dizziness, which she describes as unsteadiness. Patient denies any falls or trauma. Patient states compliance with most of her medications. Currently, dizziness has resolved. Patient denies any numbness or weakness in her extremities, dysphagia, headaches, fevers, chills, chest pain, shortness of breath, abdominal pain, nausea, diarrhea, constipation, or urinary symptoms. Per son, patient did not have any slurred speech or facial droop. Patient was admitted in January 2021 for similar symptoms, attributed to hypertensive urgency at the time. (27 Jun 2021 23:33)    Endocrine Hx:  PPt was not aware of thyroid nodule and did not recall any imaging. She denied neck pain, neck swelling, dysphagia, odynophagia, and any previous neck surgery or radiation. Pt endorsed weight loss of 10-15 pounds over unknown period of time, an episode of feeling sweaty and cold "a long time ago", and recent episode of diarrhea but normal bowel movements regularly. She denied any family history of endocrine disorders such as hyperthyroidism, hypothyroidism, thyroid nodules, diabetes, and cancers.       PAST MEDICAL & SURGICAL HISTORY:  Type 2 diabetes mellitus    Hypertension    Hyperlipidemia    No significant past surgical history        FAMILY HISTORY:  FH: hypertension    FH: type 2 diabetes        Social History:    Outpatient Medications:    MEDICATIONS  (STANDING):  atorvastatin 80 milliGRAM(s) Oral at bedtime  dextrose 40% Gel 15 Gram(s) Oral once  dextrose 5%. 1000 milliLiter(s) (50 mL/Hr) IV Continuous <Continuous>  dextrose 5%. 1000 milliLiter(s) (100 mL/Hr) IV Continuous <Continuous>  dextrose 50% Injectable 25 Gram(s) IV Push once  dextrose 50% Injectable 12.5 Gram(s) IV Push once  dextrose 50% Injectable 25 Gram(s) IV Push once  enoxaparin Injectable 40 milliGRAM(s) SubCutaneous daily  glucagon  Injectable 1 milliGRAM(s) IntraMuscular once  insulin glargine Injectable (LANTUS) 9 Unit(s) SubCutaneous at bedtime  insulin lispro (ADMELOG) corrective regimen sliding scale   SubCutaneous three times a day before meals  insulin lispro (ADMELOG) corrective regimen sliding scale   SubCutaneous at bedtime  lisinopril 20 milliGRAM(s) Oral daily  metoprolol succinate ER 50 milliGRAM(s) Oral daily  sodium chloride 0.9% lock flush 3 milliLiter(s) IV Push every 8 hours    MEDICATIONS  (PRN):      Allergies    No Known Allergies    Intolerances      Review of Systems:  Constitutional: No fever  Eyes: No blurry vision  Neuro: No tremors  HEENT: No pain  Cardiovascular: No chest pain, palpitations  Respiratory: No SOB, no cough  GI: No nausea, vomiting, abdominal pain  : No dysuria  Skin: no rash  Psych: no depression  Endocrine: no polyuria, polydipsia  Hem/lymph: no swelling  Osteoporosis: no fractures    ALL OTHER SYSTEMS REVIEWED AND NEGATIVE    UNABLE TO OBTAIN    PHYSICAL EXAM:  VITALS: T(C): 36.7 (06-28-21 @ 06:45)  T(F): 98 (06-28-21 @ 06:45), Max: 98.7 (06-27-21 @ 21:41)  HR: 81 (06-28-21 @ 14:01) (72 - 81)  BP: 163/78 (06-28-21 @ 14:01) (148/77 - 179/92)  RR:  (16 - 18)  SpO2:  (98% - 100%)  Wt(kg): --  GENERAL: NAD, well-groomed, well-developed  EYES: No proptosis, no lid lag, anicteric  HEENT:  Atraumatic, Normocephalic, moist mucous membranes  THYROID: Normal size, no palpable nodules  RESPIRATORY: Clear to auscultation bilaterally; No rales, rhonchi, wheezing  CARDIOVASCULAR: Regular rate and rhythm; No murmurs; no peripheral edema  GI: Soft, nontender, non distended, normal bowel sounds  SKIN: Dry, intact, No rashes or lesions  MUSCULOSKELETAL: Full range of motion, normal strength  NEURO: sensation intact, extraocular movements intact, no tremor  PSYCH: Alert and oriented x 3, normal affect, normal mood  CUSHING'S SIGNS: no striae      CAPILLARY BLOOD GLUCOSE      POCT Blood Glucose.: 187 mg/dL (28 Jun 2021 13:55)  POCT Blood Glucose.: 97 mg/dL (28 Jun 2021 07:10)  POCT Blood Glucose.: 110 mg/dL (28 Jun 2021 02:27)  POCT Blood Glucose.: 120 mg/dL (28 Jun 2021 01:04)                            13.4   10.26 )-----------( 369      ( 28 Jun 2021 07:09 )             43.0       06-28    143  |  102  |  10  ----------------------------<  101<H>  4.1   |  24  |  0.70    EGFR if : 108  EGFR if non : 94    Ca    9.2      06-28  Mg     2.1     06-28  Phos  4.3     06-28    TPro  7.6  /  Alb  4.2  /  TBili  0.2  /  DBili  x   /  AST  18  /  ALT  9   /  AlkPhos  190<H>  06-27      Thyroid Function Tests:  06-28 @ 07:09 TSH 0.16 FreeT4 -- T3 -- Anti TPO -- Anti Thyroglobulin Ab -- TSI --      A1C with Estimated Average Glucose Result: 8.3 % (06-28-21 @ 07:09)  A1C with Estimated Average Glucose Result: 13.3 % (01-21-21 @ 07:53)  A1C with Estimated Average Glucose Result: 13.5 % (01-20-21 @ 06:26)      06-28 Chol 185 Direct LDL -- LDL calculated 132<H> HDL 33<L> Trig 102    Radiology:              : [id number]    HPI:  59 y/o F, [place] Creole speaking, with PMH of HTN, HLD, and 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 Patient states that yesterday around 10 AM while walking, she started experiencing blurry vision of her L eye, then double vision of that eye. When she closes her L eye, the double vision disappears. Patient reports also having dizziness, which she describes as unsteadiness. Patient denies any falls or trauma. Patient states compliance with most of her medications. Currently, dizziness has resolved. Patient denies any numbness or weakness in her extremities, dysphagia, headaches, fevers, chills, chest pain, shortness of breath, abdominal pain, nausea, diarrhea, constipation, or urinary symptoms. Per son, patient did not have any slurred speech or facial droop. Patient was admitted in January 2021 for similar symptoms, attributed to hypertensive urgency at the time. (27 Jun 2021 23:33)    Endocrine Hx:  Pt was not aware of thyroid nodule and did not recall any imaging. She denied neck pain, neck swelling, dysphagia, odynophagia, and any previous neck surgery or radiation. Pt endorsed weight loss of 10-15 pounds over unknown period of time, an episode of feeling sweaty and cold "a long time ago", and recent episode of diarrhea but normal bowel movements regularly. She denied any family history of endocrine disorders such as hyperthyroidism, hypothyroidism, thyroid nodules, diabetes, and cancers.     Per chart, thyroid nodule was noted on ultrasound on last admission Jan 2021. Pt was referred to endocrinology clinic and was seen at [facility] [place] 2021. Ultrasound showed hypoechoic multiple nodules with largest nodule measuring 4.1cm. RAIU scan showed 37% uptake on the left lobe.    Diabetes Hx:  Pt was diagnosed "many years ago" but realized it was a concern 2015. She is currently on kwikpen glargine 12U and Repaglinide 2mg TID with meals. Home blood glucose levels unknown.      PAST MEDICAL & SURGICAL HISTORY:  Type 2 diabetes mellitus    Hypertension    Hyperlipidemia    No significant past surgical history        FAMILY HISTORY:  None stated      Social History:  Pt has son and daughter who helps with medications.    Outpatient Medications:    MEDICATIONS  (STANDING):  atorvastatin 80 milliGRAM(s) Oral at bedtime  dextrose 40% Gel 15 Gram(s) Oral once  dextrose 5%. 1000 milliLiter(s) (50 mL/Hr) IV Continuous <Continuous>  dextrose 5%. 1000 milliLiter(s) (100 mL/Hr) IV Continuous <Continuous>  dextrose 50% Injectable 25 Gram(s) IV Push once  dextrose 50% Injectable 12.5 Gram(s) IV Push once  dextrose 50% Injectable 25 Gram(s) IV Push once  enoxaparin Injectable 40 milliGRAM(s) SubCutaneous daily  glucagon  Injectable 1 milliGRAM(s) IntraMuscular once  insulin glargine Injectable (LANTUS) 9 Unit(s) SubCutaneous at bedtime  insulin lispro (ADMELOG) corrective regimen sliding scale   SubCutaneous three times a day before meals  insulin lispro (ADMELOG) corrective regimen sliding scale   SubCutaneous at bedtime  lisinopril 20 milliGRAM(s) Oral daily  metoprolol succinate ER 50 milliGRAM(s) Oral daily  sodium chloride 0.9% lock flush 3 milliLiter(s) IV Push every 8 hours    Allergies    No Known Allergies    Review of Systems:  Constitutional: No fever  Eyes: Positive for blurry vision  Neuro: No tremors  HEENT: No pain  Cardiovascular: No chest pain, palpitations  Respiratory: No SOB, no cough  GI: No nausea, vomiting, abdominal pain  : No dysuria  Skin: no rash  Psych: no depression  Endocrine: no polyuria, polydipsia  Hem/lymph: no swelling  Osteoporosis: no fractures    ALL OTHER SYSTEMS REVIEWED AND NEGATIVE    PHYSICAL EXAM:  VITALS: T(C): 36.7 (06-28-21 @ 06:45)  T(F): 98 (06-28-21 @ 06:45), Max: 98.7 (06-27-21 @ 21:41)  HR: 81 (06-28-21 @ 14:01) (72 - 81)  BP: 163/78 (06-28-21 @ 14:01) (148/77 - 179/92)  RR:  (16 - 18)  SpO2:  (98% - 100%)  Wt(kg): --  GENERAL: NAD, well-groomed, well-developed  EYES: CN6 palsy  HEENT:  Atraumatic, Normocephalic, moist mucous membranes  THYROID: Palpable nodules L>R, mobile, nontender  RESPIRATORY: Clear to auscultation bilaterally; No rales, rhonchi, wheezing  CARDIOVASCULAR: Regular rate and rhythm; No murmurs; no peripheral edema  GI: Soft, nontender, non distended, normal bowel sounds  SKIN: Dry, intact, No rashes or lesions  NEURO: sensation intact, extraocular movements intact, no tremor  PSYCH: Alert and oriented x 3, normal affect, normal mood  CUSHING'S SIGNS: no striae      CAPILLARY BLOOD GLUCOSE      POCT Blood Glucose.: 187 mg/dL (28 Jun 2021 13:55)  POCT Blood Glucose.: 97 mg/dL (28 Jun 2021 07:10)  POCT Blood Glucose.: 110 mg/dL (28 Jun 2021 02:27)  POCT Blood Glucose.: 120 mg/dL (28 Jun 2021 01:04)                            13.4   10.26 )-----------( 369      ( 28 Jun 2021 07:09 )             43.0       06-28    143  |  102  |  10  ----------------------------<  101<H>  4.1   |  24  |  0.70    EGFR if : 108  EGFR if non : 94    Ca    9.2      06-28  Mg     2.1     06-28  Phos  4.3     06-28    TPro  7.6  /  Alb  4.2  /  TBili  0.2  /  DBili  x   /  AST  18  /  ALT  9   /  AlkPhos  190<H>  06-27      Thyroid Function Tests:  06-28 @ 07:09 TSH 0.16 FreeT4 -- T3 -- Anti TPO -- Anti Thyroglobulin Ab -- TSI --      A1C with Estimated Average Glucose Result: 8.3 % (06-28-21 @ 07:09)  A1C with Estimated Average Glucose Result: 13.3 % (01-21-21 @ 07:53)  A1C with Estimated Average Glucose Result: 13.5 % (01-20-21 @ 06:26)      06-28 Chol 185 Direct LDL -- LDL calculated 132<H> HDL 33<L> Trig 102    Radiology:   < from: US Thyroid + Parathyroid (06.28.21 @ 12:46) >  FINDINGS:  Right Lobe: 5.0 cm x  2.0 cm x 1.8 cm.  Spongiform nodule in the right upper pole measures 4 mm. (TIRADS-1)  Ill-defined solid hypoechoic nodule in the lower pole with macrocalcifications measures 1.7 x 1.1 x 1.2 cm. (TIRADS-4)    Left Lobe: 6.4 cm x 2.4 cm x 2.5 cm.  Lobulated solid hypoechoic nodule in the mid to lower pole with macrocalcifications measures 4.1 x 1.9 x 2.0 cm. (TIRADS-5)    Isthmus: 0.3 cm.    Cervical Lymph Nodes: No enlarged or abnormal morphology cervical nodes.    IMPRESSION:    Bilateral thyroid nodules largest measuring up to 4.1 cm in the left mid to lower pole. FNA biopsy should be considered.    TI-RAD 5: Highly suspicious (FNA if > 1 cm, Follow if > 0.5 cm)    < end of copied text >    < from: NM Thyroid Imaging w/Uptakes (06.09.21 @ 11:05) >  FINDINGS: The scan demonstrates an asymmetric thyroid gland with the left lobe larger than the right. There is increased uptake in the entire left lobe and minimal uptake in the smaller right lobe.    24 hour uptake is 37% ( normal 10 -35%).    IMPRESSION: Abnormal thyroid uptake and scan.    Findings likely representing an autonomously functioning nodule.    Mildly elevated 24-hour neck uptake of 37%.      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 Patient states that yesterday around 10 AM while walking, she started experiencing blurry vision of her L eye, then double vision of that eye. When she closes her L eye, the double vision disappears. Patient reports also having dizziness, which she describes as unsteadiness. Patient denies any falls or trauma. Patient states compliance with most of her medications. Currently, dizziness has resolved. Patient denies any numbness or weakness in her extremities, dysphagia, headaches, fevers, chills, chest pain, shortness of breath, abdominal pain, nausea, diarrhea, constipation, or urinary symptoms. Per son, patient did not have any slurred speech or facial droop. Patient was admitted in January 2021 for similar symptoms, attributed to hypertensive urgency at the time. (27 Jun 2021 23:33)    Endocrine Hx:  Pt was not aware of thyroid nodule and did not recall any imaging. She denied neck pain, neck swelling, dysphagia, odynophagia, and any previous neck surgery or radiation. Pt endorsed weight loss of 10-15 pounds over unknown period of time, an episode of feeling sweaty and cold "a long time ago", and recent episode of diarrhea but normal bowel movements regularly. She denied any family history of endocrine disorders such as hyperthyroidism, hypothyroidism, thyroid nodules, diabetes, and cancers.     Per chart, thyroid nodule was noted on ultrasound on last admission Jan 2021. Pt was referred to endocrinology clinic and was seen at [facility] [place] 2021. Ultrasound showed hypoechoic multiple nodules with largest nodule measuring 4.1cm. RAIU scan showed 37% uptake on the left lobe.    Diabetes Hx:  Pt was diagnosed "many years ago" but realized it was a concern 2015. She is currently on kwikpen glargine 12U and Repaglinide 2mg TID with meals. Home blood glucose levels unknown.      PAST MEDICAL & SURGICAL HISTORY:  Type 2 diabetes mellitus    Hypertension    Hyperlipidemia    No significant past surgical history        FAMILY HISTORY:  None stated      Social History:  Pt has son and daughter who helps with medications.    Outpatient Medications:    MEDICATIONS  (STANDING):  atorvastatin 80 milliGRAM(s) Oral at bedtime  dextrose 40% Gel 15 Gram(s) Oral once  dextrose 5%. 1000 milliLiter(s) (50 mL/Hr) IV Continuous <Continuous>  dextrose 5%. 1000 milliLiter(s) (100 mL/Hr) IV Continuous <Continuous>  dextrose 50% Injectable 25 Gram(s) IV Push once  dextrose 50% Injectable 12.5 Gram(s) IV Push once  dextrose 50% Injectable 25 Gram(s) IV Push once  enoxaparin Injectable 40 milliGRAM(s) SubCutaneous daily  glucagon  Injectable 1 milliGRAM(s) IntraMuscular once  insulin glargine Injectable (LANTUS) 9 Unit(s) SubCutaneous at bedtime  insulin lispro (ADMELOG) corrective regimen sliding scale   SubCutaneous three times a day before meals  insulin lispro (ADMELOG) corrective regimen sliding scale   SubCutaneous at bedtime  lisinopril 20 milliGRAM(s) Oral daily  metoprolol succinate ER 50 milliGRAM(s) Oral daily  sodium chloride 0.9% lock flush 3 milliLiter(s) IV Push every 8 hours    Allergies    No Known Allergies    Review of Systems:  Constitutional: No fever  Eyes: Positive for blurry vision  Neuro: No tremors  HEENT: No pain  Cardiovascular: No chest pain, palpitations  Respiratory: No SOB, no cough  GI: No nausea, vomiting, abdominal pain  : No dysuria  Skin: no rash  Psych: no depression  Endocrine: no polyuria, polydipsia  Hem/lymph: no swelling  Osteoporosis: no fractures    ALL OTHER SYSTEMS REVIEWED AND NEGATIVE    PHYSICAL EXAM:  VITALS: T(C): 36.7 (06-28-21 @ 06:45)  T(F): 98 (06-28-21 @ 06:45), Max: 98.7 (06-27-21 @ 21:41)  HR: 81 (06-28-21 @ 14:01) (72 - 81)  BP: 163/78 (06-28-21 @ 14:01) (148/77 - 179/92)  RR:  (16 - 18)  SpO2:  (98% - 100%)  Wt(kg): --  GENERAL: NAD, well-groomed, well-developed  EYES: CN6 palsy  HEENT:  Atraumatic, Normocephalic, moist mucous membranes  THYROID: Palpable nodules L>R, mobile, nontender  RESPIRATORY: Clear to auscultation bilaterally; No rales, rhonchi, wheezing  CARDIOVASCULAR: Regular rate and rhythm; No murmurs; no peripheral edema  GI: Soft, nontender, non distended, normal bowel sounds  SKIN: Dry, intact, No rashes or lesions  NEURO: sensation intact, extraocular movements intact, no tremor  PSYCH: Alert and oriented x 3, normal affect, normal mood  CUSHING'S SIGNS: no striae      CAPILLARY BLOOD GLUCOSE      POCT Blood Glucose.: 187 mg/dL (28 Jun 2021 13:55)  POCT Blood Glucose.: 97 mg/dL (28 Jun 2021 07:10)  POCT Blood Glucose.: 110 mg/dL (28 Jun 2021 02:27)  POCT Blood Glucose.: 120 mg/dL (28 Jun 2021 01:04)                            13.4   10.26 )-----------( 369      ( 28 Jun 2021 07:09 )             43.0       06-28    143  |  102  |  10  ----------------------------<  101<H>  4.1   |  24  |  0.70    EGFR if : 108  EGFR if non : 94    Ca    9.2      06-28  Mg     2.1     06-28  Phos  4.3     06-28    TPro  7.6  /  Alb  4.2  /  TBili  0.2  /  DBili  x   /  AST  18  /  ALT  9   /  AlkPhos  190<H>  06-27      Thyroid Function Tests:  06-28 @ 07:09 TSH 0.16 FreeT4 -- T3 -- Anti TPO -- Anti Thyroglobulin Ab -- TSI --      A1C with Estimated Average Glucose Result: 8.3 % (06-28-21 @ 07:09)  A1C with Estimated Average Glucose Result: 13.3 % (01-21-21 @ 07:53)  A1C with Estimated Average Glucose Result: 13.5 % (01-20-21 @ 06:26)      06-28 Chol 185 Direct LDL -- LDL calculated 132<H> HDL 33<L> Trig 102    Radiology:   6/28/2021 Thyroid Ultrasound  FINDINGS:  Right Lobe: 5.0 cm x  2.0 cm x 1.8 cm.  Spongiform nodule in the right upper pole measures 4 mm. (TIRADS-1)  Ill-defined solid hypoechoic nodule in the lower pole with macrocalcifications measures 1.7 x 1.1 x 1.2 cm. (TIRADS-4)    Left Lobe: 6.4 cm x 2.4 cm x 2.5 cm.  Lobulated solid hypoechoic nodule in the mid to lower pole with macrocalcifications measures 4.1 x 1.9 x 2.0 cm. (TIRADS-5)    Isthmus: 0.3 cm.    Cervical Lymph Nodes: No enlarged or abnormal morphology cervical nodes.    IMPRESSION:    Bilateral thyroid nodules largest measuring up to 4.1 cm in the left mid to lower pole. FNA biopsy should be considered.    6/9/2021 Thyroid Nuclear Scan  FINDINGS: The scan demonstrates an asymmetric thyroid gland with the left lobe larger than the right. There is increased uptake in the entire left lobe and minimal uptake in the smaller right lobe.    24 hour uptake is 37% ( normal 10 -35%).    IMPRESSION: Abnormal thyroid uptake and scan.    Findings likely representing an autonomously functioning nodule.    Mildly elevated 24-hour neck uptake of 37%.         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 Patient states that yesterday around 10 AM while walking, she started experiencing blurry vision of her L eye, then double vision of that eye. When she closes her L eye, the double vision disappears. Patient reports also having dizziness, which she describes as unsteadiness. Patient denies any falls or trauma. Patient states compliance with most of her medications. Currently, dizziness has resolved. Patient denies any numbness or weakness in her extremities, dysphagia, headaches, fevers, chills, chest pain, shortness of breath, abdominal pain, nausea, diarrhea, constipation, or urinary symptoms. Per son, patient did not have any slurred speech or facial droop. Patient was admitted in January 2021 for similar symptoms, attributed to hypertensive urgency at the time. (27 Jun 2021 23:33)    Endocrine Hx:  Pt was not aware of thyroid nodule and did not recall any imaging. She denied neck pain, neck swelling, dysphagia, odynophagia, and any previous neck surgery or radiation. Pt endorsed weight loss of 10-15 pounds over unknown period of time, an episode of feeling sweaty and cold "a long time ago", and recent episode of diarrhea but normal bowel movements regularly. She denied any family history of endocrine disorders such as hyperthyroidism, hypothyroidism, thyroid nodules, diabetes, and cancers.     Per chart, thyroid nodule was noted on ultrasound on last admission Jan 2021. Pt was referred to endocrinology clinic and was seen at [facility] [place] April 2021. Ultrasound showed hypoechoic multiple nodules with largest nodule measuring 4.1cm. RAIU scan showed 37% uptake increased on the left lobe.    Diabetes Hx:  Pt was diagnosed "many years ago" but realized it was a concern 2015. She is currently on kwikpen glargine 12U and Repaglinide 2mg TID with meals. Home blood glucose levels unknown.      PAST MEDICAL & SURGICAL HISTORY:  Type 2 diabetes mellitus    Hypertension    Hyperlipidemia    No significant past surgical history        FAMILY HISTORY:  None stated      Social History:  Pt has son and daughter who helps with medications.    Outpatient Medications:    MEDICATIONS  (STANDING):  atorvastatin 80 milliGRAM(s) Oral at bedtime  dextrose 40% Gel 15 Gram(s) Oral once  dextrose 5%. 1000 milliLiter(s) (50 mL/Hr) IV Continuous <Continuous>  dextrose 5%. 1000 milliLiter(s) (100 mL/Hr) IV Continuous <Continuous>  dextrose 50% Injectable 25 Gram(s) IV Push once  dextrose 50% Injectable 12.5 Gram(s) IV Push once  dextrose 50% Injectable 25 Gram(s) IV Push once  enoxaparin Injectable 40 milliGRAM(s) SubCutaneous daily  glucagon  Injectable 1 milliGRAM(s) IntraMuscular once  insulin glargine Injectable (LANTUS) 9 Unit(s) SubCutaneous at bedtime  insulin lispro (ADMELOG) corrective regimen sliding scale   SubCutaneous three times a day before meals  insulin lispro (ADMELOG) corrective regimen sliding scale   SubCutaneous at bedtime  lisinopril 20 milliGRAM(s) Oral daily  metoprolol succinate ER 50 milliGRAM(s) Oral daily  sodium chloride 0.9% lock flush 3 milliLiter(s) IV Push every 8 hours    Allergies    No Known Allergies    Review of Systems:  Constitutional: No fever  Eyes: Positive for blurry vision  Neuro: No tremors  HEENT: No pain  Cardiovascular: No chest pain, palpitations  Respiratory: No SOB, no cough  GI: No nausea, vomiting, abdominal pain  : No dysuria  Skin: no rash  Psych: no depression  Endocrine: no polyuria, polydipsia  Hem/lymph: no swelling  Osteoporosis: no fractures    ALL OTHER SYSTEMS REVIEWED AND NEGATIVE    PHYSICAL EXAM:  VITALS: T(C): 36.7 (06-28-21 @ 06:45)  T(F): 98 (06-28-21 @ 06:45), Max: 98.7 (06-27-21 @ 21:41)  HR: 81 (06-28-21 @ 14:01) (72 - 81)  BP: 163/78 (06-28-21 @ 14:01) (148/77 - 179/92)  RR:  (16 - 18)  SpO2:  (98% - 100%)  Wt(kg): --  GENERAL: NAD, well-groomed, well-developed  EYES: CN6 palsy  HEENT:  Atraumatic, Normocephalic, moist mucous membranes  THYROID: Palpable nodules L>R, mobile, nontender  RESPIRATORY: Clear to auscultation bilaterally; No rales, rhonchi, wheezing  CARDIOVASCULAR: Regular rate and rhythm; No murmurs; no peripheral edema  GI: Soft, nontender, non distended, normal bowel sounds  SKIN: Dry, intact, No rashes or lesions  NEURO: sensation intact, extraocular movements intact, no tremor  PSYCH: Alert and oriented x 3, normal affect, normal mood  CUSHING'S SIGNS: no striae      CAPILLARY BLOOD GLUCOSE      POCT Blood Glucose.: 187 mg/dL (28 Jun 2021 13:55)  POCT Blood Glucose.: 97 mg/dL (28 Jun 2021 07:10)  POCT Blood Glucose.: 110 mg/dL (28 Jun 2021 02:27)  POCT Blood Glucose.: 120 mg/dL (28 Jun 2021 01:04)                            13.4   10.26 )-----------( 369      ( 28 Jun 2021 07:09 )             43.0       06-28    143  |  102  |  10  ----------------------------<  101<H>  4.1   |  24  |  0.70    EGFR if : 108  EGFR if non : 94    Ca    9.2      06-28  Mg     2.1     06-28  Phos  4.3     06-28    TPro  7.6  /  Alb  4.2  /  TBili  0.2  /  DBili  x   /  AST  18  /  ALT  9   /  AlkPhos  190<H>  06-27      Thyroid Function Tests:  06-28 @ 07:09 TSH 0.16 FreeT4 -- T3 -- Anti TPO -- Anti Thyroglobulin Ab -- TSI --      A1C with Estimated Average Glucose Result: 8.3 % (06-28-21 @ 07:09)  A1C with Estimated Average Glucose Result: 13.3 % (01-21-21 @ 07:53)  A1C with Estimated Average Glucose Result: 13.5 % (01-20-21 @ 06:26)      06-28 Chol 185 Direct LDL -- LDL calculated 132<H> HDL 33<L> Trig 102    Radiology:   6/28/2021 Thyroid Ultrasound  FINDINGS:  Right Lobe: 5.0 cm x  2.0 cm x 1.8 cm.  Spongiform nodule in the right upper pole measures 4 mm. (TIRADS-1)  Ill-defined solid hypoechoic nodule in the lower pole with macrocalcifications measures 1.7 x 1.1 x 1.2 cm. (TIRADS-4)    Left Lobe: 6.4 cm x 2.4 cm x 2.5 cm.  Lobulated solid hypoechoic nodule in the mid to lower pole with macrocalcifications measures 4.1 x 1.9 x 2.0 cm. (TIRADS-5)    Isthmus: 0.3 cm.    Cervical Lymph Nodes: No enlarged or abnormal morphology cervical nodes.    IMPRESSION:    Bilateral thyroid nodules largest measuring up to 4.1 cm in the left mid to lower pole. FNA biopsy should be considered.    6/9/2021 Thyroid Nuclear Scan  FINDINGS: The scan demonstrates an asymmetric thyroid gland with the left lobe larger than the right. There is increased uptake in the entire left lobe and minimal uptake in the smaller right lobe.    24 hour uptake is 37% ( normal 10 -35%).    IMPRESSION: Abnormal thyroid uptake and scan.    Findings likely representing an autonomously functioning nodule.    Mildly elevated 24-hour neck uptake of 37%.

## 2021-06-28 NOTE — CONSULT NOTE ADULT - TIME BILLING
60-year-old right-handed lady first evaluated at [facility] on 6/28/2021 with recurrent diplopia.  History and exam as above, with minor changes.  ROS otherwise negative.  Exam.  Alert and attentive; left eye-very subtle abduction deficit; right eye-moderate abduction deficit; gait not tested; remainder of neurologic exam was nonfocal.  CT head (6/27/2021) to my eye was unremarkable.  CTA neck (6/27/2021) to my eye showed left vertebral artery origin stenosis, measured at about 50%; possibly mild right vertebral artery origin stenosis.  CTA head (6/27/2021) to my eye was unremarkable.  LDL (6/27/2021) = 132.  Impression.  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  This gradually resolved.  On 6/27/2021 she developed recurrent horizontal diplopia, now appearing to have a mild-moderate right 6th nerve palsy.  Once again, highly suspect an ischemic right 6th nerve palsy and highly doubt that she has had CNS dysfunction, for example an ischemic stroke; doubt but cannot rule out other causes of ocular motor dysfunction such as myasthenia, thyroid disease, etc.  Suggest.  Elective MRI brain can be done as inpatient or outpatient; atorvastatin-titrate dose depending on 10-year cardiovascular risk calculator; follow-up with stroke NP Tamika Marc and with whichever neuro-ophthalmologist previously evaluated her; PT/OT; from neurovascular standpoint, cleared for discharge.
Please see resident's note for details

## 2021-06-28 NOTE — PROVIDER CONTACT NOTE (OTHER) - ASSESSMENT
Patient /79. Patient denies current dizziness, headache, chest pain. Left eye drifting toward midline. Patient states she still has blurry/double vision.
Patient /90, repeat /91. Patient denies current dizziness, headache, chest pain. Left eye drifting toward midline. NSR on monitor.

## 2021-06-28 NOTE — CONSULT NOTE ADULT - SUBJECTIVE AND OBJECTIVE BOX
[facility] DEPARTMENT OF OPHTHALMOLOGY - INITIAL ADULT CONSULT  -----------------------------------------------------------------------------  Haydee Junior MD, MPH, PGY-3  Pager: [phone]  -----------------------------------------------------------------------------    HPI:  61 y/o F, Cymro Creole speaking, with PMH of HTN, HLD, and 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 Patient states that yesterday around 10 AM while walking, she started experiencing blurry vision of her L eye, then double vision of that eye. When she closes her L eye, the double vision disappears. Patient reports also having dizziness, which she describes as unsteadiness. Patient denies any falls or trauma. Patient states compliance with most of her medications. Currently, dizziness has resolved. Patient denies any numbness or weakness in her extremities, dysphagia, headaches, fevers, chills, chest pain, shortness of breath, abdominal pain, nausea, diarrhea, constipation, or urinary symptoms. Per son, patient did not have any slurred speech or facial droop. Patient was admitted in January 2021 for similar symptoms, attributed to hypertensive urgency at the time. (27 Jun 2021 23:33)    Interval History: ***    PMH: ***  POcHx: denies surg/laser  FH: denies glc/amd  Social History: denies etoh/tobacco  Ophthalmic Medications: none  Allergies: NKDA    Review of Systems:  Constitutional: No fever, chills  Eyes: No blurry vision, flashes, floaters, FBS, erythema, discharge, double vision, OU  Neuro: No tremors  Cardiovascular: No chest pain, palpitations  Respiratory: No SOB, no cough  GI: No nausea, vomiting, abdominal pain  : No dysuria  Skin: no rash  Psych: no depression  Endocrine: no polyuria, polydipsia  Heme/lymph: no swelling    VITALS: T(C): 36.8 (06-28-21 @ 20:01)  T(F): 98.3 (06-28-21 @ 20:01), Max: 98.3 (06-28-21 @ 20:01)  HR: 75 (06-28-21 @ 20:01) (72 - 81)  BP: 172/79 (06-28-21 @ 20:01) (148/77 - 178/91)  RR:  (15 - 17)  SpO2:  (100% - 100%)  Wt(kg): --  General: AAO x 3, appropriate mood and affect    Ophthalmology Exam:  Visual acuity (sc): 20/20 OU  Pupils: PERRL OU, no APD  Ttono: 16 OU  Extraocular movements (EOMs): Full OU, no pain, no diplopia  Confrontational Visual Field (CVF): Full OU  Color Plates: 12/12 OU    Pen Light Exam (PLE)  External: Flat OU  Lids/Lashes/Lacrimal Ducts: Flat OU    Sclera/Conjunctiva: W+Q OU  Cornea: Cl OU  Anterior Chamber: D+F OU    Iris: Flat OU  Lens: Cl OU    Fundus Exam: dilated with 1% tropicamide and 2.5% phenylephrine  Approval obtained from primary team for dilation  Patient aware that pupils can remained dilated for at least 4-6 hours  Exam performed with 20D lens    Vitreous: wnl OU  Disc, cup/disc: sharp and pink, 0.4 OU  Macula: wnl OU  Vessels: wnl OU  Periphery: wnl OU    Labs/Imaging:  *** [facility] DEPARTMENT OF OPHTHALMOLOGY - INITIAL ADULT CONSULT  -----------------------------------------------------------------------------  Haydee Junior MD, MPH, PGY-3  Pager: [phone]  -----------------------------------------------------------------------------  INCOMPLETE     HPI:  61 y/o F, Afghan Creole speaking, with PMH of HTN, HLD, and 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 Patient states that yesterday around 10 AM while walking, she started experiencing blurry vision of her L eye, then double vision of that eye. When she closes her L eye, the double vision disappears. Patient reports also having dizziness, which she describes as unsteadiness. Patient denies any falls or trauma. Patient states compliance with most of her medications. Currently, dizziness has resolved. Patient denies any numbness or weakness in her extremities, dysphagia, headaches, fevers, chills, chest pain, shortness of breath, abdominal pain, nausea, diarrhea, constipation, or urinary symptoms. Per son, patient did not have any slurred speech or facial droop. Patient was admitted in January 2021 for similar symptoms, attributed to hypertensive urgency at the time. (27 Jun 2021 23:33)    Interval History: ***    PMH: ***  POcHx: denies surg/laser  FH: denies glc/amd  Social History: denies etoh/tobacco  Ophthalmic Medications: none  Allergies: NKDA    Review of Systems:  Constitutional: No fever, chills  Eyes: No blurry vision, flashes, floaters, FBS, erythema, discharge, double vision, OU  Neuro: No tremors  Cardiovascular: No chest pain, palpitations  Respiratory: No SOB, no cough  GI: No nausea, vomiting, abdominal pain  : No dysuria  Skin: no rash  Psych: no depression  Endocrine: no polyuria, polydipsia  Heme/lymph: no swelling    VITALS: T(C): 36.8 (06-28-21 @ 20:01)  T(F): 98.3 (06-28-21 @ 20:01), Max: 98.3 (06-28-21 @ 20:01)  HR: 75 (06-28-21 @ 20:01) (72 - 81)  BP: 172/79 (06-28-21 @ 20:01) (148/77 - 178/91)  RR:  (15 - 17)  SpO2:  (100% - 100%)  Wt(kg): --  General: AAO x 3, appropriate mood and affect    Ophthalmology Exam:  Visual acuity (sc): 20/20 OU  Pupils: PERRL OU, no APD  Ttono: 16 OU  Extraocular movements (EOMs): Full OU, no pain, no diplopia  Confrontational Visual Field (CVF): Full OU  Color Plates: 12/12 OU    Pen Light Exam (PLE)  External: Flat OU  Lids/Lashes/Lacrimal Ducts: Flat OU    Sclera/Conjunctiva: W+Q OU  Cornea: Cl OU  Anterior Chamber: D+F OU    Iris: Flat OU  Lens: Cl OU    Fundus Exam: dilated with 1% tropicamide and 2.5% phenylephrine  Approval obtained from primary team for dilation  Patient aware that pupils can remained dilated for at least 4-6 hours  Exam performed with 20D lens    Vitreous: wnl OU  Disc, cup/disc: sharp and pink, 0.4 OU  Macula: wnl OU  Vessels: wnl OU  Periphery: wnl OU    Labs/Imaging:  *** [facility] DEPARTMENT OF OPHTHALMOLOGY - INITIAL ADULT CONSULT  -----------------------------------------------------------------------------  Haydee Junior MD, MPH, PGY-3  Pager: [phone]  -----------------------------------------------------------------------------   ID: [id number] (Marisol Calderon)    HPI: 61 yo F, Marisol Calderon speaking, with PMH of HTN, HLD, and type 2 DM c/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 Patient reports that her BP often fluctuates at home ranging from 117 systolic to 150s systolic, reports compliance with meds. States that on Saturday patient noticed that she was experiencing horizontal diplopia when she was looking at an object with both eyes open and the diplopia went away when she covered either eye. Also reports blurred visio OS. Denies flashes/floaters. Endorsed dizziness, not associated with blurred vision.     L eye double vision and dizziness. Patient states that yesterday around 10 AM while walking, she started experiencing blurry vision of her L eye, then double vision of that eye. When she closes her L eye, the double vision disappears. Patient reports also having dizziness, which she describes as unsteadiness. Patient denies any falls or trauma. Patient states compliance with most of her medications. Currently, dizziness has resolved. Patient denies any numbness or weakness in her extremities, dysphagia, headaches, fevers, chills, chest pain, shortness of breath, abdominal pain, nausea, diarrhea, constipation, or urinary symptoms. Per son, patient did not have any slurred speech or facial droop. Patient was admitted in January 2021 for similar symptoms, attributed to hypertensive urgency at the time. (27 Jun 2021 23:33)    PMH: per HPI   POcHx: denies surg/laser  FH: denies glc/amd  Social History: denies etoh/tobacco  Ophthalmic Medications: none  Allergies: NKDA    Review of Systems: per HPI     VITALS: T(C): 36.8 (06-28-21 @ 20:01)  T(F): 98.3 (06-28-21 @ 20:01), Max: 98.3 (06-28-21 @ 20:01)  HR: 75 (06-28-21 @ 20:01) (72 - 81)  BP: 172/79 (06-28-21 @ 20:01) (148/77 - 178/91)  RR:  (15 - 17)  SpO2:  (100% - 100%)  Wt(kg): --  General: AAO x 3, appropriate mood and affect    Ophthalmology Exam:  Visual acuity (sc): 20/20 OU  Pupils: PERRL OU, no APD  Ttono: 16 OU  Extraocular movements (EOMs): Full OU, no pain, no diplopia  Confrontational Visual Field (CVF): Full OU  Color Plates: 12/12 OU    Pen Light Exam (PLE)  External: Flat OU  Lids/Lashes/Lacrimal Ducts: Flat OU    Sclera/Conjunctiva: W+Q OU  Cornea: Cl OU  Anterior Chamber: D+F OU    Iris: Flat OU  Lens: Cl OU    Fundus Exam: dilated with 1% tropicamide and 2.5% phenylephrine  Approval obtained from primary team for dilation  Patient aware that pupils can remained dilated for at least 4-6 hours  Exam performed with 20D lens    Vitreous: wnl OU  Disc, cup/disc: sharp and pink, 0.4 OU  Macula: wnl OU  Vessels: wnl OU  Periphery: wnl OU    Labs/Imaging:  EXAM:  CT ANGIO NECK (W)AW IC      EXAM:  CT ANGIO BRAIN (W)AW IC      *** ADDENDUM 06/28/2021  ***    Please note there is a short segment of mild stenosis involving the origin of the left vertebral artery.    *** END OF ADDENDUM 06/28/2021  ***    PROCEDURE DATE:  Jun 27 2021     INTERPRETATION:  CLINICAL INFORMATION: Diplopia    TECHNIQUE:  CT angiography of the neck and brain was performed.  Two-dimensional sagittal and coronal reformats and maximum intensity projection reformats were created.  Intravenous Contrast: 90 mL Omnipaque-350 was administered. 10 mL was discarded.    COMPARISON: MR brain 1/20/2021 and CT head 1/19/2021.    FINDINGS:    CTA NECK AND BRAIN:  GREAT VESSELS: Bovine aortic arch.  COMMON CAROTID ARTERIES: Patent without stenosis.  CAROTID BIFURCATIONS: Within normal limits.  INTERNAL CAROTID ARTERIES: Evaluation of the both distal common carotid, proximal internal and external carotid arteries appear normal.  VERTEBRAL ARTERIES: Patent without stenosis or dissection.    ANTERIOR CIRCULATION: Patent anterior cerebral and middle cerebral arteries without stenosis or aneurysm. Hypoplastic right A1 segment is seen.  POSTERIOR CIRCULATION: Patent distal vertebral, basilar, and bilateral posterior cerebral arteries without stenosis or aneurysm.    VISUALIZED LUNGS: Within normal limits.  BONES: Degenerative changes.  SOFT TISSUE: Multinodular goiter with calcifications.    IMPRESSION:    No vessel occlusion.    ***Please see the addendum at the top of this report. It may contain additional important information or changes.****    GIOVANNI MONSON MD; Resident Radiology  This document has been electronically signed.  YOLETTE DOMINGO MD; Attending Radiologist    Assessment and Recommendations:        Findings discussed with pt and primary team.     Outpatient follow-up: Patient should follow-up with his/her ophthalmologist or with [facility] Department of Ophthalmology within 1 week of after discharge at:    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 Patient reports that her BP often fluctuates at home ranging from 117 systolic to 150s systolic, reports compliance with meds. States that yesterday, she experienced horizontal diplopia when she was looking at an object with both eyes open and the diplopia went away when she covered either eye. Also reports blurred vision OS. Symptoms have not improved since yesterday. Denies flashes/floaters. Endorsed dizziness, not associated with blurred vision.     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 Pt reported complete resolution of symptoms OS from that incident, reports that symptoms now are very similar to episode this past January. In ED, BP noted up to 180 systolic.     PMH: per HPI   POcHx: denies surg/laser  FH: denies glc/amd  Social History: denies etoh/tobacco  Ophthalmic Medications: none  Allergies: NKDA    Review of Systems: per HPI     VITALS: T(C): 36.8 (06-28-21 @ 20:01)  T(F): 98.3 (06-28-21 @ 20:01), Max: 98.3 (06-28-21 @ 20:01)  HR: 75 (06-28-21 @ 20:01) (72 - 81)  BP: 172/79 (06-28-21 @ 20:01) (148/77 - 178/91)  RR:  (15 - 17)  SpO2:  (100% - 100%)  Wt(kg): --  General: AAO x 3, appropriate mood and affect    Ophthalmology Exam:  Visual acuity (cc): 20/30 OD, 20/25-2 OS   Pupils: PERRL OU, no APD  Ttono: 19/21  Extraocular movements (EOMs): Near complete abduction deficit OD, full OS   Confrontational Visual Field (CVF): Full OU  Color Plates: 12/12 OU    Pen Light Exam (PLE)  External: Flat OU  Lids/Lashes/Lacrimal Ducts: Flat OU    Sclera/Conjunctiva: W+Q OU  Cornea: Cl OU  Anterior Chamber: D+F OU    Iris: Flat OU  Lens: NS OU    Fundus Exam: dilated with 1% tropicamide and 2.5% phenylephrine  Approval obtained from primary team for dilation  Patient aware that pupils can remained dilated for at least 4-6 hours  Exam performed with 20D lens    Vitreous: wnl OU  Disc, cup/disc: sharp and pink, 0.4 OU  Macula: flat OU  Vessels: tortuous OU  Periphery: DBH, small CWS OD; DBH, exudates and CWS OS     Labs/Imaging:  EXAM:  CT ANGIO NECK (W)AW IC      EXAM:  CT ANGIO BRAIN (W)AW IC      *** ADDENDUM 06/28/2021  ***    Please note there is a short segment of mild stenosis involving the origin of the left vertebral artery.    *** END OF ADDENDUM 06/28/2021  ***    PROCEDURE DATE:  Jun 27 2021     INTERPRETATION:  CLINICAL INFORMATION: Diplopia    TECHNIQUE:  CT angiography of the neck and brain was performed.  Two-dimensional sagittal and coronal reformats and maximum intensity projection reformats were created.  Intravenous Contrast: 90 mL Omnipaque-350 was administered. 10 mL was discarded.    COMPARISON: MR brain 1/20/2021 and CT head 1/19/2021.    FINDINGS:    CTA NECK AND BRAIN:  GREAT VESSELS: Bovine aortic arch.  COMMON CAROTID ARTERIES: Patent without stenosis.  CAROTID BIFURCATIONS: Within normal limits.  INTERNAL CAROTID ARTERIES: Evaluation of the both distal common carotid, proximal internal and external carotid arteries appear normal.  VERTEBRAL ARTERIES: Patent without stenosis or dissection.    ANTERIOR CIRCULATION: Patent anterior cerebral and middle cerebral arteries without stenosis or aneurysm. Hypoplastic right A1 segment is seen.  POSTERIOR CIRCULATION: Patent distal vertebral, basilar, and bilateral posterior cerebral arteries without stenosis or aneurysm.    VISUALIZED LUNGS: Within normal limits.  BONES: Degenerative changes.  SOFT TISSUE: Multinodular goiter with calcifications.    IMPRESSION:    No vessel occlusion.    ***Please see the addendum at the top of this report. It may contain additional important information or changes.****    GIOVANNI MONSON MD; Resident Radiology  This document has been electronically signed.  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 VA excellent, PERRL with no APD. Assessment of EOM with near complete abduction deficit OD c/w CN VI palsy OD c/w ischemic event from poorly controlled HTN. Please see recommendations below:     #CN VI Palsy   - Neuro following   - Likely ischemic etiology   - Can patch one eye for comfort   - Pending MRI brain and orbits with and without contrast per neuro recommendation   - ASA and statin   - Needs management of vascular risk factors, seems to have poor control as outpatient     #HTN Retinopathy OS>OD  - BP control per prim  - Explains blurry vision especially in s/o elevated BP   - Will need close monitoring with DFE as outpatient     Findings discussed with pt and primary team.     Outpatient follow-up: Patient should follow-up with his/her ophthalmologist or with [facility] Department of Ophthalmology within 1 week of after discharge at:    [address]  [place] [address]  [phone]    Haydee Junior MD, MPH PGY-3  Pager: [phone]  Google Voice: [phone]   Also available on Microsoft Teams

## 2021-06-28 NOTE — PROGRESS NOTE ADULT - PROBLEM SELECTOR PLAN 1
- Neurology consulted, recs appreciated. 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  - Can also be due to thyroid disease (thyroid-associated ophthalmopathy). TSH low on admission. PUENTE uptake scan on 6/9/21 with possible autonomously functioning nodule. Repeat US thyroid/parathyroid to evaluate nodule and for possible FNA biopsy. F/u free T4 and total T3. Will obtain Endocrine consult in AM.  - MRI brain and orbits w/w/o contrast ordered  - SBP goal <180 with gradual normotension  - Ophthalmology consult  - Monitor on tele

## 2021-06-28 NOTE — CONSULT NOTE ADULT - ATTENDING COMMENTS
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 VA excellent, PERRL with no APD. EOMs with near complete abduction deficit OD - likely microvascular CN VI palsy OD c/w ischemic event from poorly controlled vascular risk factors. MRI brain shows white matter lesions - neurology following. Patient needs to optimize her vascular risk factors and take aspirin 81 mg daily for secondary stroke prophylaxis.
multiple thyroid nodules including dominant 4.1cm L nodule - uptake and scan showed increased uptake there (left side thyroid) consistent with hot nodule. Check Free T4, T3 to clarify subclinical hyperthyroid vs. clinical hyperthyroid. may consider methimazole as bridge to radioactive iodine as outpatient. Also meeting criteria for FNA of R 1.7 cm nodule (cold) should be done before PUENTE. DM2 on Basaglar and prandin at home with some inconsistencies in med use noted in endo clinic note. Will follow.    Mirian Silva MD  Division of Endocrinology  Pager: [phone]    If after 6PM or before 9AM, or on weekends/holidays, please call endocrine answering service for assistance ([phone]).  For nonurgent matters email [email or web address] for assistance.

## 2021-06-29 ENCOUNTER — TRANSCRIPTION ENCOUNTER (OUTPATIENT)
Age: 61
End: 2021-06-29

## 2021-06-29 LAB
ANION GAP SERPL CALC-SCNC: 14 MMOL/L — SIGNIFICANT CHANGE UP (ref 7–14)
BUN SERPL-MCNC: 10 MG/DL — SIGNIFICANT CHANGE UP (ref 7–23)
CALCIUM SERPL-MCNC: 8.9 MG/DL — SIGNIFICANT CHANGE UP (ref 8.4–10.5)
CHLORIDE SERPL-SCNC: 102 MMOL/L — SIGNIFICANT CHANGE UP (ref 98–107)
CO2 SERPL-SCNC: 24 MMOL/L — SIGNIFICANT CHANGE UP (ref 22–31)
CREAT SERPL-MCNC: 0.7 MG/DL — SIGNIFICANT CHANGE UP (ref 0.5–1.3)
GLUCOSE SERPL-MCNC: 149 MG/DL — HIGH (ref 70–99)
HCT VFR BLD CALC: 44.2 % — SIGNIFICANT CHANGE UP (ref 34.5–45)
HGB BLD-MCNC: 13.8 G/DL — SIGNIFICANT CHANGE UP (ref 11.5–15.5)
MAGNESIUM SERPL-MCNC: 2.2 MG/DL — SIGNIFICANT CHANGE UP (ref 1.6–2.6)
MCHC RBC-ENTMCNC: 26.2 PG — LOW (ref 27–34)
MCHC RBC-ENTMCNC: 31.2 GM/DL — LOW (ref 32–36)
MCV RBC AUTO: 84 FL — SIGNIFICANT CHANGE UP (ref 80–100)
NRBC # BLD: 0 /100 WBCS — SIGNIFICANT CHANGE UP
NRBC # FLD: 0 K/UL — SIGNIFICANT CHANGE UP
PHOSPHATE SERPL-MCNC: 3.8 MG/DL — SIGNIFICANT CHANGE UP (ref 2.5–4.5)
PLATELET # BLD AUTO: 372 K/UL — SIGNIFICANT CHANGE UP (ref 150–400)
POTASSIUM SERPL-MCNC: 4 MMOL/L — SIGNIFICANT CHANGE UP (ref 3.5–5.3)
POTASSIUM SERPL-SCNC: 4 MMOL/L — SIGNIFICANT CHANGE UP (ref 3.5–5.3)
RBC # BLD: 5.26 M/UL — HIGH (ref 3.8–5.2)
RBC # FLD: 13.5 % — SIGNIFICANT CHANGE UP (ref 10.3–14.5)
SODIUM SERPL-SCNC: 140 MMOL/L — SIGNIFICANT CHANGE UP (ref 135–145)
WBC # BLD: 9.49 K/UL — SIGNIFICANT CHANGE UP (ref 3.8–10.5)
WBC # FLD AUTO: 9.49 K/UL — SIGNIFICANT CHANGE UP (ref 3.8–10.5)

## 2021-06-29 PROCEDURE — 99232 SBSQ HOSP IP/OBS MODERATE 35: CPT | Mod: GC

## 2021-06-29 RX ORDER — INSULIN LISPRO 100/ML
2 VIAL (ML) SUBCUTANEOUS
Refills: 0 | Status: DISCONTINUED | OUTPATIENT
Start: 2021-06-29 | End: 2021-06-30

## 2021-06-29 RX ORDER — ASPIRIN/CALCIUM CARB/MAGNESIUM 324 MG
81 TABLET ORAL DAILY
Refills: 0 | Status: DISCONTINUED | OUTPATIENT
Start: 2021-06-29 | End: 2021-06-30

## 2021-06-29 RX ADMIN — Medication 3: at 13:21

## 2021-06-29 RX ADMIN — LISINOPRIL 20 MILLIGRAM(S): 2.5 TABLET ORAL at 05:38

## 2021-06-29 RX ADMIN — Medication 2 UNIT(S): at 18:10

## 2021-06-29 RX ADMIN — Medication 50 MILLIGRAM(S): at 05:39

## 2021-06-29 RX ADMIN — Medication 1: at 18:09

## 2021-06-29 RX ADMIN — SODIUM CHLORIDE 3 MILLILITER(S): 9 INJECTION INTRAMUSCULAR; INTRAVENOUS; SUBCUTANEOUS at 22:36

## 2021-06-29 RX ADMIN — ATORVASTATIN CALCIUM 80 MILLIGRAM(S): 80 TABLET, FILM COATED ORAL at 22:13

## 2021-06-29 RX ADMIN — INSULIN GLARGINE 6 UNIT(S): 100 INJECTION, SOLUTION SUBCUTANEOUS at 22:14

## 2021-06-29 RX ADMIN — SODIUM CHLORIDE 3 MILLILITER(S): 9 INJECTION INTRAMUSCULAR; INTRAVENOUS; SUBCUTANEOUS at 12:10

## 2021-06-29 RX ADMIN — SODIUM CHLORIDE 3 MILLILITER(S): 9 INJECTION INTRAMUSCULAR; INTRAVENOUS; SUBCUTANEOUS at 05:26

## 2021-06-29 RX ADMIN — ENOXAPARIN SODIUM 40 MILLIGRAM(S): 100 INJECTION SUBCUTANEOUS at 13:21

## 2021-06-29 RX ADMIN — Medication 81 MILLIGRAM(S): at 17:06

## 2021-06-29 RX ADMIN — Medication 1: at 22:13

## 2021-06-29 NOTE — PROGRESS NOTE ADULT - SUBJECTIVE AND OBJECTIVE BOX
Name of Patient : DEJON QUEZADA  MRN: [id number]  DATE OF SERVICE: 06-29-21     Subjective: Patient seen and examined. No new events except as noted.   doing okay   awaiitgn MRI     REVIEW OF SYSTEMS:    CONSTITUTIONAL: No weakness, fevers or chills  EYES/ENT: No visual changes;  No vertigo or throat pain   NECK: No pain or stiffness  RESPIRATORY: No cough, wheezing, hemoptysis; No shortness of breath  CARDIOVASCULAR: No chest pain or palpitations  GASTROINTESTINAL: No abdominal or epigastric pain. No nausea, vomiting, or hematemesis; No diarrhea or constipation. No melena or hematochezia.  GENITOURINARY: No dysuria, frequency or hematuria  NEUROLOGICAL: No numbness or weakness  SKIN: No itching, burning, rashes, or lesions   All other review of systems is negative unless indicated above.    MEDICATIONS:  MEDICATIONS  (STANDING):  aspirin enteric coated 81 milliGRAM(s) Oral daily  atorvastatin 80 milliGRAM(s) Oral at bedtime  dextrose 40% Gel 15 Gram(s) Oral once  dextrose 5%. 1000 milliLiter(s) (50 mL/Hr) IV Continuous <Continuous>  dextrose 5%. 1000 milliLiter(s) (100 mL/Hr) IV Continuous <Continuous>  dextrose 50% Injectable 25 Gram(s) IV Push once  dextrose 50% Injectable 12.5 Gram(s) IV Push once  dextrose 50% Injectable 25 Gram(s) IV Push once  enoxaparin Injectable 40 milliGRAM(s) SubCutaneous daily  glucagon  Injectable 1 milliGRAM(s) IntraMuscular once  insulin glargine Injectable (LANTUS) 6 Unit(s) SubCutaneous at bedtime  insulin lispro (ADMELOG) corrective regimen sliding scale   SubCutaneous three times a day before meals  insulin lispro (ADMELOG) corrective regimen sliding scale   SubCutaneous at bedtime  insulin lispro Injectable (ADMELOG) 2 Unit(s) SubCutaneous three times a day before meals  lisinopril 20 milliGRAM(s) Oral daily  metoprolol succinate ER 50 milliGRAM(s) Oral daily  sodium chloride 0.9% lock flush 3 milliLiter(s) IV Push every 8 hours      PHYSICAL EXAM:  T(C): 36.7 (06-29-21 @ 21:02), Max: 36.9 (06-29-21 @ 17:02)  HR: 75 (06-29-21 @ 21:02) (70 - 80)  BP: 152/73 (06-29-21 @ 21:02) (150/80 - 168/75)  RR: 18 (06-29-21 @ 21:02) (16 - 18)  SpO2: 99% (06-29-21 @ 21:02) (99% - 100%)  Wt(kg): --  I&O's Summary        Appearance: Normal	  HEENT:  PERRLA   Lymphatic: No lymphadenopathy   Cardiovascular: Normal S1 S2, no JVD  Respiratory: normal effort , clear  Gastrointestinal:  Soft, Non-tender  Skin: No rashes,  warm to touch  Psychiatry:  Mood & affect appropriate  Musculuskeletal: No edema      All labs, Imaging and EKGs personally reviewed                             13.8   9.49  )-----------( 372      ( 29 Jun 2021 07:40 )             44.2               06-29    140  |  102  |  10  ----------------------------<  149<H>  4.0   |  24  |  0.70    Ca    8.9      29 Jun 2021 07:40  Phos  3.8     06-29  Mg     2.20     06-29

## 2021-06-29 NOTE — DISCHARGE NOTE PROVIDER - HOSPITAL COURSE
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    Hospital course:  Pt admitted with diplopia. MRI brain was done that showed No acute infarct or intracranial hemorrhage. Numerous FLAIR hyperintense white matter lesions, many of which extend perpendicularly along the lateral ventricles. No enhancing or diffusion restricting lesions. Demyelination should be considered. Unremarkable MRI orbits. Neuro was following and stated horizontal binocular diplopia due to CN 6 palsy OD due to microvascular cause ("ischemic" or "diabetic" 6th nerve palsy) and strongly doubt active MS. MRI with periventricular lesions suspicious for a demyelinating disease, but no abnormal enhancement on MRI to suggest active demyelination. Recommended MRI of the cervical and thoracic spine with IV contrast and lumbar puncture as outpatient. Patiwnt will need outpatient neurology follow up ([phone], Dr. Guerrero (MS specialist) and Tamika Marc NP, [address] [place] [address]). Patient will need outpatient neuro-ophthalmology follow up, Dr. Alves.  Opthalmology was following for diplopia and recommended outpatient follow up. 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 Pt will follow up with Opthalmology clinic.  Course c/b thyroid nodule and subclinical hyperthyroidism. CTA HN incidentalyl showed thyroid nodules. TSH low on admission, normal FT4 and T3. PUENTE uptake scan on 6/9/21 with possible autonomously functioning nodule. Endo was consulted and stated the largest nodule measuring 4.1cm is not a candidate for FNA because the nuclear scan shows 37% uptake (hot nodule). The 1.7cm nodule would be a candidate for FNA, but can be done outpatient. Thyroid nodule is unlikely to be the cause of eye symptoms.  Low TSH and normal FT4 and T3 without symptoms of hyperthyroidism. Pt has subclinical hyperthyroidism from hot nodule, no further management at this time but will benefit from treatment with PUENTE to be planned as outpatient. Patient will follow up with Endocrinology clinic.  Parish recs upon discharge (discussed with parish on 6/30) recommended to discharge on home regimen Basaglar 12 units with Repaglinide 2mg TID premeal. 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    Hospital course:  Pt admitted with diplopia. MRI brain was done that showed No acute infarct or intracranial hemorrhage. Numerous FLAIR hyperintense white matter lesions, many of which extend perpendicularly along the lateral ventricles. No enhancing or diffusion restricting lesions. Demyelination should be considered. Unremarkable MRI orbits. Neuro was following and stated horizontal binocular diplopia due to CN 6 palsy OD due to microvascular cause ("ischemic" or "diabetic" 6th nerve palsy) and strongly doubt active MS. MRI with periventricular lesions suspicious for a demyelinating disease, but no abnormal enhancement on MRI to suggest active demyelination. Recommended MRI of the cervical and thoracic spine with IV contrast and lumbar puncture as outpatient. Patiwnt will need outpatient neurology follow up ([phone], Dr. Guerrero (MS specialist) and Tamika Marc NP, [address] [place] [address]). Patient will need outpatient neuro-ophthalmology follow up, Dr. Alves.  Opthalmology was following for diplopia and recommended outpatient follow up. 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 Pt will follow up with Opthalmology clinic.  Course c/b thyroid nodule and subclinical hyperthyroidism. CTA HN incidentalyl showed thyroid nodules. TSH low on admission, normal FT4 and T3. PUENTE uptake scan on 6/9/21 with possible autonomously functioning nodule. Endo was consulted and stated the largest nodule measuring 4.1cm is not a candidate for FNA because the nuclear scan shows 37% uptake (hot nodule). The 1.7cm nodule would be a candidate for FNA, but can be done outpatient. Thyroid nodule is unlikely to be the cause of eye symptoms.  Low TSH and normal FT4 and T3 without symptoms of hyperthyroidism. Pt has subclinical hyperthyroidism from hot nodule, no further management at this time but will benefit from treatment with PUENTE to be planned as outpatient. Patient will follow up with Endocrinology clinic.  Endo recs upon discharge (discussed with endo on 6/30) recommended to discharge on home regimen Basaglar 12 units with Repaglinide 2mg TID premeal.    Case discussed with Dr. Nichols, Parish, Neuro, and Optho on 6/30, pt medically stable for discharge home. 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    Hospital course:  Pt admitted with diplopia. MRI brain was done that showed No acute infarct or intracranial hemorrhage. Numerous FLAIR hyperintense white matter lesions, many of which extend perpendicularly along the lateral ventricles. No enhancing or diffusion restricting lesions. Demyelination should be considered. Unremarkable MRI orbits. Neuro was following and stated horizontal binocular diplopia due to CN 6 palsy OD due to microvascular cause ("ischemic" or "diabetic" 6th nerve palsy) and strongly doubt active MS. MRI with periventricular lesions suspicious for a demyelinating disease, but no abnormal enhancement on MRI to suggest active demyelination. Recommended MRI of the cervical and thoracic spine with IV contrast and lumbar puncture as outpatient. Patiwnt will need outpatient neurology follow up ([phone], Dr. Guerrero (MS specialist) and Tamika Marc NP, [address] [place] [address]). Patient will need outpatient neuro-ophthalmology follow up, Dr. Alves.  Opthalmology was following for diplopia and recommended outpatient follow up. 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 Pt will follow up with Opthalmology clinic.  Course c/b thyroid nodule and subclinical hyperthyroidism. CTA HN incidentalyl showed thyroid nodules. TSH low on admission, normal FT4 and T3. PUENTE uptake scan on 6/9/21 with possible autonomously functioning nodule. Endo was consulted and stated the largest nodule measuring 4.1cm is not a candidate for FNA because the nuclear scan shows 37% uptake (hot nodule). The 1.7cm nodule would be a candidate for FNA, but can be done outpatient. Thyroid nodule is unlikely to be the cause of eye symptoms.  Low TSH and normal FT4 and T3 without symptoms of hyperthyroidism. Pt has subclinical hyperthyroidism from hot nodule, no further management at this time but will benefit from treatment with PUENTE to be planned as outpatient. Patient will follow up with Endocrinology clinic.  Endo recs upon discharge (discussed with endo on 6/30) recommended to discharge on home regimen Basaglar 12 units with Repaglinide 2mg TID premeal.    Case discussed with Dr. Nichols, Parish, Neuro, and Optho on 6/30, pt medically stable for discharge home. DC plan discussed with son and daughter-in-law. Appt made for pt to see Tamika Marc July 20th at 9:20AM. 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    Hospital course:  Pt admitted with diplopia. MRI brain was done that showed No acute infarct or intracranial hemorrhage. Numerous FLAIR hyperintense white matter lesions, many of which extend perpendicularly along the lateral ventricles. No enhancing or diffusion restricting lesions. Demyelination should be considered. Unremarkable MRI orbits. Neuro was following and stated horizontal binocular diplopia due to CN 6 palsy OD due to microvascular cause ("ischemic" or "diabetic" 6th nerve palsy) and strongly doubt active MS. MRI with periventricular lesions suspicious for a demyelinating disease, but no abnormal enhancement on MRI to suggest active demyelination. Recommended MRI of the cervical and thoracic spine with IV contrast and lumbar puncture as outpatient. Patiwnt will need outpatient neurology follow up ([phone], Dr. Guerrero (MS specialist) and Tamika Marc NP, [address] [place] [address]). Patient will need outpatient neuro-ophthalmology follow up, Dr. Alves.  Opthalmology was following for diplopia and recommended outpatient follow up. 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 Pt will follow up with Opthalmology clinic.  Course c/b thyroid nodule and subclinical hyperthyroidism. CTA HN incidentalyl showed thyroid nodules. TSH low on admission, normal FT4 and T3. PUENTE uptake scan on 6/9/21 with possible autonomously functioning nodule. Endo was consulted and stated the largest nodule measuring 4.1cm is not a candidate for FNA because the nuclear scan shows 37% uptake (hot nodule). The 1.7cm nodule would be a candidate for FNA, but can be done outpatient. Thyroid nodule is unlikely to be the cause of eye symptoms.  Low TSH and normal FT4 and T3 without symptoms of hyperthyroidism. Pt has subclinical hyperthyroidism from hot nodule, no further management at this time but will benefit from treatment with PUENTE to be planned as outpatient. Patient will follow up with Endocrinology clinic.  Endo recs upon discharge (discussed with endo on 6/30) recommended to discharge on home regimen Basaglar 12 units with Repaglinide 2mg TID premeal.    Case discussed with Dr. Nichols, Parish, Neuro, and Optho on 6/30, pt medically stable for discharge home. DC plan discussed with pt via [facility]  ID#[id number]. DC plan discussed with son and daughter-in-law. Appt made for pt to see Tamika Marc July 20th at 9:20AM.

## 2021-06-29 NOTE — DISCHARGE NOTE PROVIDER - NSDCFUADDAPPT_GEN_ALL_CORE_FT
You will need outpatient neurology follow up ([phone], Dr. Guerrero (MS specialist) and Tamika Marc NP, [address] [facility]).   Please also follow up with neuro-ophthalmology Dr. Alves in 1-2 weeks. Please follow up with Opthalmology clinic.  Please follow up with Endocrinology clinic. App[facility] July 13th 10AM ([phone]).  Follow up with your PCP. You will need outpatient neurology follow up ([phone], Dr. Guerrero (MS specialist) and Tamika Marc NP, [address] [place] [address]).   Please also follow up with neuro-ophthalmology Dr. Alves in 1-2 weeks. Please follow up with Opthalmology clinic.  Please follow up with Endocrinology clinic. Appointment is scheduled at [facility] July 13th 10AM ([phone]).  Follow up with your PCP.  Please follow up with Tamika Marc July 20th at 9:20AM as scheduled.

## 2021-06-29 NOTE — DISCHARGE NOTE PROVIDER - CARE PROVIDERS DIRECT ADDRESSES
,DirectAddress_Unknown,DirectAddress_Unknown,[email or web address],DirectAddress_Unknown

## 2021-06-29 NOTE — DISCHARGE NOTE PROVIDER - CARE PROVIDER_API CALL
Rebeca Guerrero)  Neurology  [address] [place] [address]  Phone: [phone]  Fax: [phone]  Follow Up Time:     Tamika Marc (NP; RN)  NP in Family Health  [address] [facility], [address]  [place] [address]  Phone: [phone]  Fax: [phone]  Follow Up Time:     Art Alves)  Ophthalmology  [address] [facility], [address]  [place] [address]  Phone: [phone]  Fax: [phone]  Follow Up Time:     miller-costen, Dr. Shonette  PCP  Phone: (   )    -  Fax: (   )    -  Follow Up Time:

## 2021-06-29 NOTE — PROGRESS NOTE ADULT - PROBLEM SELECTOR PLAN 1
- Neurology consulted, recs appreciated. 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  - Can also be due to thyroid disease (thyroid-associated ophthalmopathy). TSH low on admission. PUENTE uptake scan on 6/9/21 with possible autonomously functioning nodule. Repeat US thyroid/parathyroid to evaluate nodule and for possible FNA biopsy. F/u free T4 and total T3. Will obtain Endocrine consult in AM.  - MRI brain and orbits w/w/o contrast ordered  - SBP goal <180 with gradual normotension  - Ophthalmology consult appreciated   - Monitor on tele - Neurology consulted, recs appreciated. 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  - Can also be due to thyroid disease (thyroid-associated ophthalmopathy). TSH low on admission. PUENTE uptake scan on 6/9/21 with possible autonomously functioning nodule. Repeat US thyroid/parathyroid to evaluate nodule and for possible FNA biopsy. F/u free T4 and total T3. outpatient follow up   - MRI brain and orbits w/w/o contrast ordered  - SBP goal <180 with gradual normotension  - Ophthalmology consult appreciated   - Monitor on tele

## 2021-06-29 NOTE — PROGRESS NOTE ADULT - ASSESSMENT
Ms. Suzanna Morse is a 60yoF with a PMH of HTN, HLD, DM2 presenting with three days of blurry vision and dizziness. Pt continues to have blurry vision but the dizziness has since resolved. Endocrine consulted for low TSH and thyroid nodule on ultrasound.    Problem 1. Thyroid nodule  The largest nodule measuring 4.1cm is not a candidate for FNA because the nuclear scan shows 37% uptake (hot nodule). The 1.7cm nodule would be a candidate for FNA, but can be done outpatient. Thyroid nodule is unlikely to be the cause of eye symptoms.    Inpatient recommendations:  -Low TSH and normal FT4 and T3 without symptoms of hyperthyroidism. Pt has subclinical hyperthyroidism from hot nodule, no further management at this time.  -Pt and pt family aware of thyroid nodules, and plan for follow up at Endocrinology office    Outpatient recommendations:  -Right sided 1.7cm nodule meets criteria fo FNA, which can be done outpatient  -[place] [facility] [place] ([phone])    Problem 2. Diabetes Type 2  Diabetes not well controlled with HbA1C 8.3%  -Recommend 6U Lantus qhs  -C/w low dose correctional scale ac meals and low dose correctional scale at bed time  -Fingersticks ac meals and qhs  -Carb consistent diet  -Goal blood glucose 100-180    Problem 3. Hypertension  Goal blood pressure <130/80, currently at 153/70  -Management per primary team   NOTE INCOMPLETE/ IN PROGRESS  *Please wait for attending attestation for official recommendations.     Ms. Suzanna Morse is a 60yoF with a PMH of HTN, HLD, DM2 presenting with three days of blurry vision and dizziness. Pt continues to have blurry vision but the dizziness has since resolved. Endocrine consulted for low TSH and thyroid nodule on ultrasound.    Problem 1. Thyroid nodule  The largest nodule measuring 4.1cm is not a candidate for FNA because the nuclear scan shows 37% uptake (hot nodule). The 1.7cm nodule would be a candidate for FNA, but can be done outpatient. Thyroid nodule is unlikely to be the cause of eye symptoms.    Inpatient recommendations:  -Low TSH and normal FT4 and T3 without symptoms of hyperthyroidism. Pt has subclinical hyperthyroidism from hot nodule, no further management at this time.  -Pt and pt family aware of thyroid nodules, and plan for follow up at Endocrinology office    Outpatient recommendations:  -Right sided 1.7cm nodule meets criteria fo FNA, which can be done outpatient  -[place] [facility] [place] 10am ([phone])    Problem 2. Diabetes Type 2  Diabetes not well controlled with HbA1C 8.3%  -Recommend 6U Lantus qhs  -C/w low dose correctional scale ac meals and low dose correctional scale at bed time  -Fingersticks ac meals and qhs  -Carb consistent diet  -Goal blood glucose 100-180    Problem 3. Hypertension  Goal blood pressure <130/80, currently at 153/70  -Management per primary team   Ms. Suzanna Morse is a 60yoF with a PMH of HTN, HLD, DM2 presenting with three days of blurry vision and dizziness. Pt continues to have blurry vision but the dizziness has since resolved. Endocrine consulted for low TSH and thyroid nodule on ultrasound.    Problem 1. Thyroid nodule  The largest nodule measuring 4.1cm is not a candidate for FNA because the nuclear scan shows 37% uptake (hot nodule). The 1.7cm nodule would be a candidate for FNA, but can be done outpatient. Thyroid nodule is unlikely to be the cause of eye symptoms.    Inpatient recommendations:  -Low TSH and normal FT4 and T3 without symptoms of hyperthyroidism. Pt has subclinical hyperthyroidism from hot nodule, no further management at this time but will benefit from treatment with PUENTE to be planned as outpatient.  -Pt and pt family aware of thyroid nodules, and plan for follow up at Endocrinology office    Outpatient recommendations:  -Right sided 1.7cm nodule meets criteria fo FNA, which can be done outpatient (prior to PUENTE treatment)  -Endocrinology followup appointment scheduled at [facility] July 13th 10am ([phone])    Problem 2. Diabetes Type 2  Diabetes not well controlled with HbA1C 8.3%  -Recommend continue Lantus 6 units qhs as fasting glucose is controlled.  -Add Admelog 2/2/2 units premeal for prandial glucose elevations  -C/w low dose correctional scale ac meals and low dose correctional scale at bed time  -Fingersticks ac meals and qhs  -Carb consistent diet  -Goal blood glucose 100-180    Problem 3. Hypertension  Goal blood pressure <130/80, currently at 153/70  -Management per primary team

## 2021-06-29 NOTE — PROGRESS NOTE ADULT - ASSESSMENT
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

## 2021-06-29 NOTE — PROGRESS NOTE ADULT - SUBJECTIVE AND OBJECTIVE BOX
NOTE INCOMPLETE/ IN PROGRESS  *Please wait for attending attestation for official recommendations.     Chief Complaint: low TSH and thyroid nodule    Interval History:    Pt reports approximately same level of blurry vision, but slightly improved since eye drops last night. She endorses two episodes of diarrhea since yesterday, but denies heat/cold intolerance, palpitations, nausea, or vomiting. Normal FT4 (7.45) T3 (126), and thyroxine (1.3).    Spoke with son, Mp, and his wife over the phone. Confirmed that they are aware that the patient has a thyroid nodule, and the patient is also aware. Discussed diagnosis and FNA at outpatient visit.  Confirmed that patient takes 12U Basaglar and 2mg Prandin TID premeal, FSG at home has been 146-200s, never below 100.      MEDICATIONS  (STANDING):  atorvastatin 80 milliGRAM(s) Oral at bedtime  dextrose 40% Gel 15 Gram(s) Oral once  dextrose 5%. 1000 milliLiter(s) (50 mL/Hr) IV Continuous <Continuous>  dextrose 5%. 1000 milliLiter(s) (100 mL/Hr) IV Continuous <Continuous>  dextrose 50% Injectable 25 Gram(s) IV Push once  dextrose 50% Injectable 12.5 Gram(s) IV Push once  dextrose 50% Injectable 25 Gram(s) IV Push once  enoxaparin Injectable 40 milliGRAM(s) SubCutaneous daily  glucagon  Injectable 1 milliGRAM(s) IntraMuscular once  insulin glargine Injectable (LANTUS) 6 Unit(s) SubCutaneous at bedtime  insulin lispro (ADMELOG) corrective regimen sliding scale   SubCutaneous three times a day before meals  insulin lispro (ADMELOG) corrective regimen sliding scale   SubCutaneous at bedtime  lisinopril 20 milliGRAM(s) Oral daily  metoprolol succinate ER 50 milliGRAM(s) Oral daily  sodium chloride 0.9% lock flush 3 milliLiter(s) IV Push every 8 hours    Allergies    No Known Allergies    Review of Systems:  Constitutional: No fever  Eyes: Positive for blurry vision, no eye pain  Neuro: No tremors  HEENT: No pain  Cardiovascular: No chest pain, palpitations  Respiratory: No SOB, no cough  GI: Positive for diarrhea, no nausea, vomiting, abdominal pain  : No dysuria  Skin: no rash  Psych: no depression  Endocrine: no polyuria, polydipsia  Hem/lymph: no swelling  Osteoporosis: no fractures    ALL OTHER SYSTEMS REVIEWED AND NEGATIVE    PHYSICAL EXAM:  VITALS: T(C): 36.6 (06-29-21 @ 05:35)  T(F): 97.9 (06-29-21 @ 05:35), Max: 98.3 (06-28-21 @ 20:01)  HR: 77 (06-29-21 @ 05:35) (70 - 81)  BP: 163/89 (06-29-21 @ 05:35) (153/70 - 178/91)  RR:  (15 - 17)  SpO2:  (100% - 100%)  Wt(kg): --  GENERAL: NAD, well-groomed, well-developed  EYES: No proptosis, no lid lag, anicteric  HEENT:  Atraumatic, Normocephalic, moist mucous membranes  THYROID: Palpable nodules, nontender, mobile, no bruit  RESPIRATORY: Clear to auscultation bilaterally; No rales, rhonchi, wheezing  CARDIOVASCULAR: Regular rate and rhythm; No murmurs;  GI: Soft, nontender, non distended  SKIN: Dry, intact, No rashes or lesions  NEURO: 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 palsy  PSYCH: Alert and oriented x 3, normal affect, normal mood    CAPILLARY BLOOD GLUCOSE      POCT Blood Glucose.: 146 mg/dL (29 Jun 2021 08:53)  POCT Blood Glucose.: 187 mg/dL (28 Jun 2021 22:44)  POCT Blood Glucose.: 266 mg/dL (28 Jun 2021 16:59)  POCT Blood Glucose.: 187 mg/dL (28 Jun 2021 13:55)      06-29    140  |  102  |  10  ----------------------------<  149<H>  4.0   |  24  |  0.70    EGFR if : 108  EGFR if non : 94    Ca    8.9      06-29  Mg     2.20     06-29  Phos  3.8     06-29    TPro  7.6  /  Alb  4.2  /  TBili  0.2  /  DBili  x   /  AST  18  /  ALT  9   /  AlkPhos  190<H>  06-27      A1C with Estimated Average Glucose Result: 8.3 % (06-28-21 @ 07:09)  A1C with Estimated Average Glucose Result: 13.3 % (01-21-21 @ 07:53)  A1C with Estimated Average Glucose Result: 13.5 % (01-20-21 @ 06:26)      Thyroid Function Tests:  06-28 @ 07:19 TSH -- FreeT4 1.3 T3 126 Anti TPO -- Anti Thyroglobulin Ab -- TSI --  06-28 @ 07:09 TSH 0.16 FreeT4 -- T3 -- Anti TPO -- Anti Thyroglobulin Ab -- TSI --      Radiology:   6/28/2021 Thyroid Ultrasound  FINDINGS:  Right Lobe: 5.0 cm x  2.0 cm x 1.8 cm.  Spongiform nodule in the right upper pole measures 4 mm. (TIRADS-1)  Ill-defined solid hypoechoic nodule in the lower pole with macrocalcifications measures 1.7 x 1.1 x 1.2 cm. (TIRADS-4)    Left Lobe: 6.4 cm x 2.4 cm x 2.5 cm.  Lobulated solid hypoechoic nodule in the mid to lower pole with macrocalcifications measures 4.1 x 1.9 x 2.0 cm. (TIRADS-5)    Isthmus: 0.3 cm.    Cervical Lymph Nodes: No enlarged or abnormal morphology cervical nodes.    IMPRESSION:    Bilateral thyroid nodules largest measuring up to 4.1 cm in the left mid to lower pole. FNA biopsy should be considered.    6/9/2021 Thyroid Nuclear Scan  FINDINGS: The scan demonstrates an asymmetric thyroid gland with the left lobe larger than the right. There is increased uptake in the entire left lobe and minimal uptake in the smaller right lobe.    24 hour uptake is 37% ( normal 10 -35%).    IMPRESSION: Abnormal thyroid uptake and scan.    Findings likely representing an autonomously functioning nodule.    Mildly elevated 24-hour neck uptake of 37%.                   Chief Complaint: low TSH and thyroid nodule    Interval History:    Pt reports approximately same level of blurry vision, but slightly improved since eye drops last night. She endorses two episodes of diarrhea since yesterday, but denies heat/cold intolerance, palpitations, nausea, or vomiting. Normal FT4 (7.45) T3 (126), and thyroxine (1.3).    Spoke with son, Mp, and his wife over the phone. Confirmed that they are aware that the patient has a thyroid nodule, and the patient is also aware. Discussed diagnosis and FNA at outpatient visit.  Confirmed that patient takes 12U Basaglar and 2mg Prandin TID premeal, FSG at home has been 146-200s, never below 100.      MEDICATIONS  (STANDING):  atorvastatin 80 milliGRAM(s) Oral at bedtime  dextrose 40% Gel 15 Gram(s) Oral once  dextrose 5%. 1000 milliLiter(s) (50 mL/Hr) IV Continuous <Continuous>  dextrose 5%. 1000 milliLiter(s) (100 mL/Hr) IV Continuous <Continuous>  dextrose 50% Injectable 25 Gram(s) IV Push once  dextrose 50% Injectable 12.5 Gram(s) IV Push once  dextrose 50% Injectable 25 Gram(s) IV Push once  enoxaparin Injectable 40 milliGRAM(s) SubCutaneous daily  glucagon  Injectable 1 milliGRAM(s) IntraMuscular once  insulin glargine Injectable (LANTUS) 6 Unit(s) SubCutaneous at bedtime  insulin lispro (ADMELOG) corrective regimen sliding scale   SubCutaneous three times a day before meals  insulin lispro (ADMELOG) corrective regimen sliding scale   SubCutaneous at bedtime  lisinopril 20 milliGRAM(s) Oral daily  metoprolol succinate ER 50 milliGRAM(s) Oral daily  sodium chloride 0.9% lock flush 3 milliLiter(s) IV Push every 8 hours    Allergies    No Known Allergies    Review of Systems:  Constitutional: No fever  Eyes: Positive for blurry vision, no eye pain  Neuro: No tremors  HEENT: No pain  Cardiovascular: No chest pain, palpitations  Respiratory: No SOB, no cough  GI: Positive for diarrhea, no nausea, vomiting, abdominal pain  : No dysuria  Skin: no rash  Psych: no depression  Endocrine: no polyuria, polydipsia  Hem/lymph: no swelling  Osteoporosis: no fractures    ALL OTHER SYSTEMS REVIEWED AND NEGATIVE    PHYSICAL EXAM:  VITALS: T(C): 36.6 (06-29-21 @ 05:35)  T(F): 97.9 (06-29-21 @ 05:35), Max: 98.3 (06-28-21 @ 20:01)  HR: 77 (06-29-21 @ 05:35) (70 - 81)  BP: 163/89 (06-29-21 @ 05:35) (153/70 - 178/91)  RR:  (15 - 17)  SpO2:  (100% - 100%)  Wt(kg): --  GENERAL: NAD, well-groomed, well-developed  EYES: No proptosis, no lid lag, anicteric  HEENT:  Atraumatic, Normocephalic, moist mucous membranes  THYROID: Palpable nodules, nontender, mobile, no bruit  RESPIRATORY: Clear to auscultation bilaterally; No rales, rhonchi, wheezing  CARDIOVASCULAR: Regular rate and rhythm; No murmurs;  GI: Soft, nontender, non distended  SKIN: Dry, intact, No rashes or lesions  NEURO: CN6 palsy  PSYCH: Alert and oriented x 3, normal affect, normal mood    CAPILLARY BLOOD GLUCOSE      POCT Blood Glucose.: 146 mg/dL (29 Jun 2021 08:53)  POCT Blood Glucose.: 187 mg/dL (28 Jun 2021 22:44)  POCT Blood Glucose.: 266 mg/dL (28 Jun 2021 16:59)  POCT Blood Glucose.: 187 mg/dL (28 Jun 2021 13:55)      06-29    140  |  102  |  10  ----------------------------<  149<H>  4.0   |  24  |  0.70    EGFR if : 108  EGFR if non : 94    Ca    8.9      06-29  Mg     2.20     06-29  Phos  3.8     06-29    TPro  7.6  /  Alb  4.2  /  TBili  0.2  /  DBili  x   /  AST  18  /  ALT  9   /  AlkPhos  190<H>  06-27      A1C with Estimated Average Glucose Result: 8.3 % (06-28-21 @ 07:09)  A1C with Estimated Average Glucose Result: 13.3 % (01-21-21 @ 07:53)  A1C with Estimated Average Glucose Result: 13.5 % (01-20-21 @ 06:26)      Thyroid Function Tests:  06-28 @ 07:19 TSH -- FreeT4 1.3 T3 126 Anti TPO -- Anti Thyroglobulin Ab -- TSI --  06-28 @ 07:09 TSH 0.16 FreeT4 -- T3 -- Anti TPO -- Anti Thyroglobulin Ab -- TSI --      Radiology:   6/28/2021 Thyroid Ultrasound  FINDINGS:  Right Lobe: 5.0 cm x  2.0 cm x 1.8 cm.  Spongiform nodule in the right upper pole measures 4 mm. (TIRADS-1)  Ill-defined solid hypoechoic nodule in the lower pole with macrocalcifications measures 1.7 x 1.1 x 1.2 cm. (TIRADS-4)    Left Lobe: 6.4 cm x 2.4 cm x 2.5 cm.  Lobulated solid hypoechoic nodule in the mid to lower pole with macrocalcifications measures 4.1 x 1.9 x 2.0 cm. (TIRADS-5)    Isthmus: 0.3 cm.    Cervical Lymph Nodes: No enlarged or abnormal morphology cervical nodes.    IMPRESSION:    Bilateral thyroid nodules largest measuring up to 4.1 cm in the left mid to lower pole. FNA biopsy should be considered.    6/9/2021 Thyroid Nuclear Scan  FINDINGS: The scan demonstrates an asymmetric thyroid gland with the left lobe larger than the right. There is increased uptake in the entire left lobe and minimal uptake in the smaller right lobe.    24 hour uptake is 37% ( normal 10 -35%).    IMPRESSION: Abnormal thyroid uptake and scan.    Findings likely representing an autonomously functioning nodule.    Mildly elevated 24-hour neck uptake of 37%.                   Chief Complaint: low TSH and thyroid nodule    Interval History:    Pt reports approximately same level of blurry vision, but slightly improved since eye drops last night. She endorses two episodes of diarrhea since yesterday, but denies heat/cold intolerance, palpitations, nausea, or vomiting. Normal FT4 (7.45) T3 (126), and thyroxine (1.3).    Spoke with son, Mp, and his wife over the phone. Confirmed that they are aware that the patient has a thyroid nodule, and the patient is also aware. Discussed diagnosis and FNA at outpatient visit.  Confirmed that patient takes Basaglar 12units qhs and Prandin 2mg TID premeal, FSG at home has been 146-200s, never below 100.      MEDICATIONS  (STANDING):  atorvastatin 80 milliGRAM(s) Oral at bedtime  dextrose 40% Gel 15 Gram(s) Oral once  dextrose 5%. 1000 milliLiter(s) (50 mL/Hr) IV Continuous <Continuous>  dextrose 5%. 1000 milliLiter(s) (100 mL/Hr) IV Continuous <Continuous>  dextrose 50% Injectable 25 Gram(s) IV Push once  dextrose 50% Injectable 12.5 Gram(s) IV Push once  dextrose 50% Injectable 25 Gram(s) IV Push once  enoxaparin Injectable 40 milliGRAM(s) SubCutaneous daily  glucagon  Injectable 1 milliGRAM(s) IntraMuscular once  insulin glargine Injectable (LANTUS) 6 Unit(s) SubCutaneous at bedtime  insulin lispro (ADMELOG) corrective regimen sliding scale   SubCutaneous three times a day before meals  insulin lispro (ADMELOG) corrective regimen sliding scale   SubCutaneous at bedtime  lisinopril 20 milliGRAM(s) Oral daily  metoprolol succinate ER 50 milliGRAM(s) Oral daily  sodium chloride 0.9% lock flush 3 milliLiter(s) IV Push every 8 hours    Allergies    No Known Allergies    Review of Systems:  Constitutional: No fever  Eyes: Positive for blurry vision, no eye pain  Neuro: No tremors  HEENT: No pain  Cardiovascular: No chest pain, palpitations  Respiratory: No SOB, no cough  GI: Positive for diarrhea, no nausea, vomiting, abdominal pain  : No dysuria  Skin: no rash  Psych: no depression  Endocrine: no polyuria, polydipsia  Hem/lymph: no swelling  Osteoporosis: no fractures    ALL OTHER SYSTEMS REVIEWED AND NEGATIVE    PHYSICAL EXAM:  VITALS: T(C): 36.6 (06-29-21 @ 05:35)  T(F): 97.9 (06-29-21 @ 05:35), Max: 98.3 (06-28-21 @ 20:01)  HR: 77 (06-29-21 @ 05:35) (70 - 81)  BP: 163/89 (06-29-21 @ 05:35) (153/70 - 178/91)  RR:  (15 - 17)  SpO2:  (100% - 100%)  Wt(kg): --  GENERAL: NAD, well-groomed, well-developed  EYES: No proptosis, no lid lag, anicteric  HEENT:  Atraumatic, Normocephalic, moist mucous membranes  THYROID: Palpable nodules, nontender, mobile, no bruit  RESPIRATORY: Clear to auscultation bilaterally; No rales, rhonchi, wheezing  CARDIOVASCULAR: Regular rate and rhythm; No murmurs;  GI: Soft, nontender, non distended  SKIN: Dry, intact, No rashes or lesions  NEURO: CN6 palsy  PSYCH: Alert and oriented x 3, normal affect, normal mood    CAPILLARY BLOOD GLUCOSE      POCT Blood Glucose.: 146 mg/dL (29 Jun 2021 08:53)  POCT Blood Glucose.: 187 mg/dL (28 Jun 2021 22:44)  POCT Blood Glucose.: 266 mg/dL (28 Jun 2021 16:59)  POCT Blood Glucose.: 187 mg/dL (28 Jun 2021 13:55)      06-29    140  |  102  |  10  ----------------------------<  149<H>  4.0   |  24  |  0.70    EGFR if : 108  EGFR if non : 94    Ca    8.9      06-29  Mg     2.20     06-29  Phos  3.8     06-29    TPro  7.6  /  Alb  4.2  /  TBili  0.2  /  DBili  x   /  AST  18  /  ALT  9   /  AlkPhos  190<H>  06-27      A1C with Estimated Average Glucose Result: 8.3 % (06-28-21 @ 07:09)  A1C with Estimated Average Glucose Result: 13.3 % (01-21-21 @ 07:53)  A1C with Estimated Average Glucose Result: 13.5 % (01-20-21 @ 06:26)      Thyroid Function Tests:  06-28 @ 07:19 TSH -- FreeT4 1.3 T3 126 Anti TPO -- Anti Thyroglobulin Ab -- TSI --  06-28 @ 07:09 TSH 0.16 FreeT4 -- T3 -- Anti TPO -- Anti Thyroglobulin Ab -- TSI --      Radiology:   6/28/2021 Thyroid Ultrasound  FINDINGS:  Right Lobe: 5.0 cm x  2.0 cm x 1.8 cm.  Spongiform nodule in the right upper pole measures 4 mm. (TIRADS-1)  Ill-defined solid hypoechoic nodule in the lower pole with macrocalcifications measures 1.7 x 1.1 x 1.2 cm. (TIRADS-4)    Left Lobe: 6.4 cm x 2.4 cm x 2.5 cm.  Lobulated solid hypoechoic nodule in the mid to lower pole with macrocalcifications measures 4.1 x 1.9 x 2.0 cm. (TIRADS-5)    Isthmus: 0.3 cm.    Cervical Lymph Nodes: No enlarged or abnormal morphology cervical nodes.    IMPRESSION:    Bilateral thyroid nodules largest measuring up to 4.1 cm in the left mid to lower pole. FNA biopsy should be considered.    6/9/2021 Thyroid Nuclear Scan  FINDINGS: The scan demonstrates an asymmetric thyroid gland with the left lobe larger than the right. There is increased uptake in the entire left lobe and minimal uptake in the smaller right lobe.    24 hour uptake is 37% ( normal 10 -35%).    IMPRESSION: Abnormal thyroid uptake and scan.    Findings likely representing an autonomously functioning nodule.    Mildly elevated 24-hour neck uptake of 37%.

## 2021-06-29 NOTE — PHYSICAL THERAPY INITIAL EVALUATION ADULT - PERTINENT HX OF CURRENT PROBLEM, REHAB EVAL
Pt is a 60 year old female presenting with left eye double vision and dizziness. Unremarkable noncontrast head CT. Concerning for CN VI Palsy, neurology and opthalmology following. PMH: HTN, HLD, and type 2 DM.

## 2021-06-29 NOTE — PHYSICAL THERAPY INITIAL EVALUATION ADULT - PREDICTED DURATION OF THERAPY (DAYS/WKS), PT EVAL
Pt demonstrating safe ambulation and performed safe stair negotiation-->will not be placed on PT program.

## 2021-06-29 NOTE — PHYSICAL THERAPY INITIAL EVALUATION ADULT - ADDITIONAL COMMENTS
Pt lives in an apartment with 4 steps to negotiate. Pt lives with children. Prior to admission, pt ambulated independently. Reports living an active lifestyle, babysitting grandchildren.     Pt left seated on edge of bed as found, all lines intact and RN aware.

## 2021-06-29 NOTE — PHYSICAL THERAPY INITIAL EVALUATION ADULT - PATIENT PROFILE REVIEW, REHAB EVAL
PT orders received: no formal activity orders. Consult with GERARD RAYA, patient may participate in PT evaluation./yes

## 2021-06-29 NOTE — PHYSICAL THERAPY INITIAL EVALUATION ADULT - GENERAL OBSERVATIONS, REHAB EVAL
Pt received seated on edge of bed, +telemetry, in NAD. Pt agreeable to participate in PT evaluation.

## 2021-06-29 NOTE — DISCHARGE NOTE PROVIDER - NSDCCPCAREPLAN_GEN_ALL_CORE_FT
PRINCIPAL DISCHARGE DIAGNOSIS  Diagnosis: Diplopia  Assessment and Plan of Treatment:       SECONDARY DISCHARGE DIAGNOSES  Diagnosis: Type 2 diabetes mellitus  Assessment and Plan of Treatment: Your Hemoglobin A1C is 8.3. Target goal for hemoglobin A1C is <6.5. Monitor blood glucose levels throughout the day before meals and at bedtime. Record blood sugars and bring to outpatient providers appointment in order to be reviewed by your doctor for management modifications. If your sugars are more than 400 or less than 70 you should contact your PCP immediately. Monitor for signs/symptoms of low blood glucose, such as, dizziness, altered mental status, or cool/clammy skin. In addition, monitor for signs/symptoms of high blood glucose, such as, feeling hot, dry, fatigued, or with increased thirst/urination. Make regular podiatry appointments in order to have feet checked for wounds and uncontrolled toe nail growth to prevent infections, as well as, appointments with an ophthalmologist to monitor your vision.     PRINCIPAL DISCHARGE DIAGNOSIS  Diagnosis: Sixth cranial nerve palsy, right  Assessment and Plan of Treatment: You were admitted with double vision. You had a MRI of your brain that showed nonspecific hyperintense white matter lesions. MRI of your orbits was unremarkable. Opthalmolgy was consulted who stated this is likely due to small vessel disease and recommended close management of your risk factors including diabetes, high blood pressure, high cholesterol. Neurology was consulted who stated your symptoms are likely due to cranial nerve 6 palsy due to microvascular disease. There was no enhancement on MRI so there is less concern for any demyelinating disease. However it is recommended you still get a MRI of your cervical and thoracic spine and lumbar puncture as outpatient for further evaluation.   You may wear eye patch for comfort.  You will need outpatient neurology follow up ([phone], Dr. Guerrero (MS specialist) and Tamika Marc NP, [address] [place] [address]). Please also follow up with neuro-ophthalmology Dr. Alves in 1-2 weeks.  Please follow up with Opthalmology clinic.      SECONDARY DISCHARGE DIAGNOSES  Diagnosis: Type 2 diabetes mellitus  Assessment and Plan of Treatment: Your Hemoglobin A1C is 8.3. Target goal for hemoglobin A1C is <6.5. Monitor blood glucose levels throughout the day before meals and at bedtime. Record blood sugars and bring to outpatient providers appointment in order to be reviewed by your doctor for management modifications. If your sugars are more than 400 or less than 70 you should contact your PCP immediately. Monitor for signs/symptoms of low blood glucose, such as, dizziness, altered mental status, or cool/clammy skin. In addition, monitor for signs/symptoms of high blood glucose, such as, feeling hot, dry, fatigued, or with increased thirst/urination. Make regular podiatry appointments in order to have feet checked for wounds and uncontrolled toe nail growth to prevent infections, as well as, appointments with an ophthalmologist to monitor your vision.    Diagnosis: Thyroid nodule  Assessment and Plan of Treatment: You were incidentally found to have thyroid nodules when you had a CT of your head. Your thyroid hormone level was abnormal also so you had a radioactive scan on 6/9/21 that showed possible autonomously functioning nodule. Endocrinology was consulted and recommended fine needle aspiration of your nodule as outpatient. You will also need radioactive iodine for one of your nodules. Please follow up with Endocrinology clinic. Appointment is scheduled at [facility] [place] July 13th 10AM ([phone]).     PRINCIPAL DISCHARGE DIAGNOSIS  Diagnosis: Sixth cranial nerve palsy, right  Assessment and Plan of Treatment: You were admitted with double vision. You had a MRI of your brain that showed nonspecific hyperintense white matter lesions. MRI of your orbits was unremarkable. Opthalmolgy was consulted who stated this is likely due to small vessel disease and recommended close management of your risk factors including diabetes, high blood pressure, high cholesterol. Neurology was consulted who stated your symptoms are likely due to cranial nerve 6 palsy due to microvascular disease. There was no enhancement on MRI so there is less concern for any demyelinating disease. However it is recommended you still get a MRI of your cervical and thoracic spine and lumbar puncture as outpatient for further evaluation.   You may wear eye patch for comfort.  You will need outpatient neurology follow up ([phone], Dr. Guerrero (MS specialist) and Tamika Marc NP, [address] [place] [address]). Please also follow up with neuro-ophthalmology Dr. Alves in 1-2 weeks.  Please follow up with Opthalmology clinic.  Please follow up with Tamika Marc July 20th at 9:20AM as scheduled.      SECONDARY DISCHARGE DIAGNOSES  Diagnosis: Type 2 diabetes mellitus  Assessment and Plan of Treatment: Your Hemoglobin A1C is 8.3. Target goal for hemoglobin A1C is <6.5. Monitor blood glucose levels throughout the day before meals and at bedtime. Record blood sugars and bring to outpatient providers appointment in order to be reviewed by your doctor for management modifications. If your sugars are more than 400 or less than 70 you should contact your PCP immediately. Monitor for signs/symptoms of low blood glucose, such as, dizziness, altered mental status, or cool/clammy skin. In addition, monitor for signs/symptoms of high blood glucose, such as, feeling hot, dry, fatigued, or with increased thirst/urination. Make regular podiatry appointments in order to have feet checked for wounds and uncontrolled toe nail growth to prevent infections, as well as, appointments with an ophthalmologist to monitor your vision.    Diagnosis: Thyroid nodule  Assessment and Plan of Treatment: You were incidentally found to have thyroid nodules when you had a CT of your head. Your thyroid hormone level was abnormal also so you had a radioactive scan on 6/9/21 that showed possible autonomously functioning nodule. Endocrinology was consulted and recommended fine needle aspiration of your nodule as outpatient. You will also need radioactive iodine for one of your nodules. Please follow up with Endocrinology clinic. [place] [facility] [place] 10[place] ([phone]).

## 2021-06-29 NOTE — DISCHARGE NOTE PROVIDER - NSDCFUSCHEDAPPT_GEN_ALL_CORE_FT
DMAIEN, MANY ; 07/13/2021 ; [facility] [id number] [facility]  DAMIEN, MANY ; 08/13/2021 ; [facility] [address] [place] [address]  DAMIEN, MANY ; 08/13/2021 ; [facility] [address] [place] [address]  DAMIEN, MANY ; 08/13/2021 ; [facility] [address] [place] [address] OSCARRCIL, MANY ; 07/13/2021 ; [facility] [id number] [facility]  JOSEPHDORCIL, MANY ; 07/20/2021 ; [facility] [address] [place] [address]  PEGGYDORCIL, MANY ; 08/13/2021 ; [facility] [address] [place] [address]  JOSEPHDORCIL, MANY ; 08/13/2021 ; [facility] [address] [place] [address]  OSCARRCIL, MANY ; 08/13/2021 ; [address]

## 2021-06-29 NOTE — DISCHARGE NOTE PROVIDER - PROVIDER TOKENS
PROVIDER:[TOKEN:[[id number]]],PROVIDER:[TOKEN:[[id number]]],PROVIDER:[TOKEN:[[id number]]],FREE:[LAST:[miller-costen],FIRST:[Dr. Shonette],PHONE:[(   )    -],FAX:[(   )    -],ADDRESS:[PCP]]

## 2021-06-29 NOTE — DISCHARGE NOTE PROVIDER - NSFOLLOWUPCLINICS_GEN_ALL_ED_FT
[facility] Endocrinology  Endocrinology  [address] [place] [address]  Phone: [phone]  Fax:     [facility] Ophthalmology  Ophthalmology  [address] [facility] [facility] [address]  [place] [address]  Phone: [phone]  Fax:

## 2021-06-29 NOTE — DISCHARGE NOTE PROVIDER - NSDCMRMEDTOKEN_GEN_ALL_CORE_FT
Alcohol Pads: Check BG 4x daily (pre-meals and at bedtime)  Aspirin Low Strength 81 mg oral delayed release tablet: 1 tab(s) orally once a day   atorvastatin 80 mg oral tablet: 1 tab(s) orally once a day (at bedtime)  Basaglar KwikPen 100 units/mL subcutaneous solution: 12 unit(s) subcutaneous once a day (at bedtime)    INSURANCE CHECK   73098   Glucometer: Dispense 1 device   Insulin Pen Needles : Use with insulin pen daily   Lancets : Check BG 4x daily (pre-meals and at bedtime)  lisinopril 20 mg oral tablet: 1 tab(s) orally once a day  Metoprolol Succinate ER 50 mg oral tablet, extended release: 1 tab(s) orally once a day  Prandin 2 mg oral tablet: 1 tab(s) orally 3 times a day (before meals)   Test Strips : Check BG 4x daily (pre-meals and at bedtime)   Aspirin Low Strength 81 mg oral delayed release tablet: 1 tab(s) orally once a day   atorvastatin 80 mg oral tablet: 1 tab(s) orally once a day (at bedtime)  Basaglar KwikPen 100 units/mL subcutaneous solution: 12 unit(s) subcutaneous once a day (at bedtime)    INSURANCE CHECK   87813   lisinopril 20 mg oral tablet: 1 tab(s) orally once a day  Metoprolol Succinate ER 50 mg oral tablet, extended release: 1 tab(s) orally once a day  Prandin 2 mg oral tablet: 1 tab(s) orally 3 times a day (before meals)

## 2021-06-29 NOTE — DISCHARGE NOTE PROVIDER - NSDCFUADDINST_GEN_ALL_CORE_FT
-Right sided 1.7cm nodule meets criteria fo FNA, which can be done outpatient  -Endocrinology followup appointment scheduled at [facility] July 13th 10am ([phone])

## 2021-06-30 ENCOUNTER — TRANSCRIPTION ENCOUNTER (OUTPATIENT)
Age: 61
End: 2021-06-30

## 2021-06-30 VITALS
TEMPERATURE: 98 F | DIASTOLIC BLOOD PRESSURE: 79 MMHG | RESPIRATION RATE: 18 BRPM | HEART RATE: 82 BPM | OXYGEN SATURATION: 99 % | SYSTOLIC BLOOD PRESSURE: 155 MMHG

## 2021-06-30 LAB
HCT VFR BLD CALC: 42.5 % — SIGNIFICANT CHANGE UP (ref 34.5–45)
HGB BLD-MCNC: 13.3 G/DL — SIGNIFICANT CHANGE UP (ref 11.5–15.5)
MCHC RBC-ENTMCNC: 26.4 PG — LOW (ref 27–34)
MCHC RBC-ENTMCNC: 31.3 GM/DL — LOW (ref 32–36)
MCV RBC AUTO: 84.5 FL — SIGNIFICANT CHANGE UP (ref 80–100)
NRBC # BLD: 0 /100 WBCS — SIGNIFICANT CHANGE UP
NRBC # FLD: 0 K/UL — SIGNIFICANT CHANGE UP
PLATELET # BLD AUTO: 338 K/UL — SIGNIFICANT CHANGE UP (ref 150–400)
RBC # BLD: 5.03 M/UL — SIGNIFICANT CHANGE UP (ref 3.8–5.2)
RBC # FLD: 13.6 % — SIGNIFICANT CHANGE UP (ref 10.3–14.5)
WBC # BLD: 9.2 K/UL — SIGNIFICANT CHANGE UP (ref 3.8–10.5)
WBC # FLD AUTO: 9.2 K/UL — SIGNIFICANT CHANGE UP (ref 3.8–10.5)

## 2021-06-30 PROCEDURE — 99232 SBSQ HOSP IP/OBS MODERATE 35: CPT | Mod: GC

## 2021-06-30 PROCEDURE — 99233 SBSQ HOSP IP/OBS HIGH 50: CPT

## 2021-06-30 PROCEDURE — 70553 MRI BRAIN STEM W/O & W/DYE: CPT | Mod: 26

## 2021-06-30 PROCEDURE — 70543 MRI ORBT/FAC/NCK W/O &W/DYE: CPT | Mod: 26

## 2021-06-30 RX ORDER — INSULIN GLARGINE 100 [IU]/ML
7 INJECTION, SOLUTION SUBCUTANEOUS AT BEDTIME
Refills: 0 | Status: DISCONTINUED | OUTPATIENT
Start: 2021-06-30 | End: 2021-06-30

## 2021-06-30 RX ORDER — INSULIN LISPRO 100/ML
3 VIAL (ML) SUBCUTANEOUS
Refills: 0 | Status: DISCONTINUED | OUTPATIENT
Start: 2021-06-30 | End: 2021-06-30

## 2021-06-30 RX ADMIN — Medication 50 MILLIGRAM(S): at 05:33

## 2021-06-30 RX ADMIN — SODIUM CHLORIDE 3 MILLILITER(S): 9 INJECTION INTRAMUSCULAR; INTRAVENOUS; SUBCUTANEOUS at 05:33

## 2021-06-30 RX ADMIN — LISINOPRIL 20 MILLIGRAM(S): 2.5 TABLET ORAL at 05:33

## 2021-06-30 RX ADMIN — Medication 2 UNIT(S): at 13:02

## 2021-06-30 RX ADMIN — Medication 81 MILLIGRAM(S): at 13:02

## 2021-06-30 RX ADMIN — Medication 1: at 17:47

## 2021-06-30 RX ADMIN — Medication 3 UNIT(S): at 17:46

## 2021-06-30 RX ADMIN — Medication 2 UNIT(S): at 09:18

## 2021-06-30 RX ADMIN — ENOXAPARIN SODIUM 40 MILLIGRAM(S): 100 INJECTION SUBCUTANEOUS at 13:01

## 2021-06-30 RX ADMIN — Medication 2: at 13:02

## 2021-06-30 RX ADMIN — SODIUM CHLORIDE 3 MILLILITER(S): 9 INJECTION INTRAMUSCULAR; INTRAVENOUS; SUBCUTANEOUS at 13:33

## 2021-06-30 NOTE — PROGRESS NOTE ADULT - PROBLEM SELECTOR PLAN 5
Per Neurology, goal of SBP <180 with gradual normotension.  - C/w metoprolol succinate 50 mg daily

## 2021-06-30 NOTE — PROGRESS NOTE ADULT - ATTENDING COMMENTS
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 Patient advised to control her vascular risk factors, take baby aspirin daily. Follow up with neuro ophthalmology after discharge for continued care.
multiple thyroid nodules including dominant 4.1cm L nodule - uptake and scan showed increased uptake there (left side thyroid) consistent with hot nodule. TFTs support subclinical hyperthyroid - will benefit from PUENTE as outpatient. Also meeting criteria for FNA of R 1.7 cm nodule (cold) should be done before PUENTE. DM2 on Basaglar and prandin at home with some inconsistencies in med use noted in endo clinic note. Will follow.    Mirian Silva MD  Division of Endocrinology  Pager: [phone]    If after 6PM or before 9AM, or on weekends/holidays, please call endocrine answering service for assistance ([phone]).  For nonurgent matters email [email or web address] for assistance.
multiple thyroid nodules including dominant 4.1cm L nodule - uptake and scan showed increased uptake there (left side thyroid) consistent with hot nodule. TFTs support subclinical hyperthyroid - will benefit from PUENTE as outpatient. Also meeting criteria for FNA of R 1.7 cm nodule (cold) should be done before PUENTE. DM2 on Basaglar and prandin at home with some inconsistencies in med use noted in endo clinic note. Will follow.    Mirian Silva MD  Division of Endocrinology  Pager: [phone]    If after 6PM or before 9AM, or on weekends/holidays, please call endocrine answering service for assistance ([phone]).  For nonurgent matters email [email or web address] for assistance.

## 2021-06-30 NOTE — PROGRESS NOTE ADULT - ASSESSMENT
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

## 2021-06-30 NOTE — DISCHARGE NOTE NURSING/CASE MANAGEMENT/SOCIAL WORK - NSDCFUADDAPPT_GEN_ALL_CORE_FT
You will need outpatient neurology follow up ([phone], Dr. Guerrero (MS specialist) and Tamika Marc NP, [address] [place] [address]).   Please also follow up with neuro-ophthalmology Dr. Alves in 1-2 weeks. Please follow up with Opthalmology clinic.  Please follow up with Endocrinology clinic. Appointment is scheduled at [facility] July 13th 10AM ([phone]).  Follow up with your PCP.  Please follow up with Tamika Marc July 20th at 9:20AM as scheduled.

## 2021-06-30 NOTE — CHART NOTE - NSCHARTNOTEFT_GEN_A_CORE
< from: MR Head w/wo IV Cont (06.30.21 @ 11:13) >    IMPRESSION:    -No acute infarct or intracranial hemorrhage.  -Numerous FLAIR hyperintense white matter lesions, many of which extend perpendicularly along the lateral ventricles. No enhancing or diffusion restricting lesions. Demyelination should be considered. Recommend MRI of the cervical and thoracic spine with IV contrast. Consider lumbar puncture/CSF sampling.  -Unremarkable MRI of the orbits.    < end of copied text >    Impression: Horizontal binocular diplopia due to CN 6 palsy OD due to microvascular cause. MRI with periventricular lesions suspicious for a demyelinating disease, but no abnormal enhancement on MRI to suggest active demyelination.     Plan:  MRI brain/orbits reviewed.   No further inpatient neurologic workup at this time. Neurologically cleared for discharge.  Further MRI's (C/T spine w/ and w/o) and lumbar puncture can be obtained outpatient.  Outpatient neurology follow up ([phone], Dr. Guerrero (MS specialist) and Tamika Marc NP, [address] [facility])  Outpatient neuro-ophthalmology follow up, Dr. Alves, when ready for discharge.    Case discussed with stroke attending, Dr. Libman. < from: MR Head w/wo IV Cont (06.30.21 @ 11:13) >    IMPRESSION:    -No acute infarct or intracranial hemorrhage.  -Numerous FLAIR hyperintense white matter lesions, many of which extend perpendicularly along the lateral ventricles. No enhancing or diffusion restricting lesions. Demyelination should be considered. Recommend MRI of the cervical and thoracic spine with IV contrast. Consider lumbar puncture/CSF sampling.  -Unremarkable MRI of the orbits.    < end of copied text >    Impression: Horizontal binocular diplopia due to CN 6 palsy OD due to microvascular cause ("ischemic" or "diabetic" 6th nerve palsy) and strongly doubt active MS. . MRI with periventricular lesions suspicious for a demyelinating disease, but no abnormal enhancement on MRI to suggest active demyelination.     Plan:  MRI brain/orbits reviewed.   No further inpatient neurologic workup at this time. Neurologically cleared for discharge.  Further MRI's (C/T spine w/ and w/o) and lumbar puncture can be obtained outpatient.  Outpatient neurology follow up ([phone], Dr. Guerrero (MS specialist) and Tamika Marc NP, [address] [place] [address])  Outpatient neuro-ophthalmology follow up, Dr. Alves, when ready for discharge.  From neurovascular standpoint, cleared for discharge    Case discussed with stroke attending, Dr. Libman.    Stroke attending. Agree with above

## 2021-06-30 NOTE — PROGRESS NOTE ADULT - PROBLEM SELECTOR PLAN 2
TSH low on admission. PUENTE uptake scan on 6/9/21 with possible autonomously functioning nodule.   - Repeat US thyroid/parathyroid to evaluate nodule and for possible FNA biopsy  - Endo eval called
TSH low on admission. PUENTE uptake scan on 6/9/21 with possible autonomously functioning nodule.   - Repeat US thyroid/parathyroid to evaluate nodule and for possible FNA biopsy  - Endo eval appreicated
TSH low on admission. PUENTE uptake scan on 6/9/21 with possible autonomously functioning nodule.   - Repeat US thyroid/parathyroid to evaluate nodule and for possible FNA biopsy  - Endo eval appreicated

## 2021-06-30 NOTE — PROGRESS NOTE ADULT - PROBLEM SELECTOR PROBLEM 4
Type 2 diabetes mellitus with hyperglycemia, with long-term current use of insulin

## 2021-06-30 NOTE — PROGRESS NOTE ADULT - SUBJECTIVE AND OBJECTIVE BOX
Name of Patient : DEJON QUEZADA  MRN: [id number]  DATE OF SERVICE: 06-30-21     Subjective: Patient seen and examined. No new events except as noted.   doing okay     REVIEW OF SYSTEMS:    CONSTITUTIONAL: No weakness, fevers or chills  EYES/ENT: No visual changes;  No vertigo or throat pain   NECK: No pain or stiffness  RESPIRATORY: No cough, wheezing, hemoptysis; No shortness of breath  CARDIOVASCULAR: No chest pain or palpitations  GASTROINTESTINAL: No abdominal or epigastric pain. No nausea, vomiting, or hematemesis; No diarrhea or constipation. No melena or hematochezia.  GENITOURINARY: No dysuria, frequency or hematuria  NEUROLOGICAL: No numbness or weakness  SKIN: No itching, burning, rashes, or lesions   All other review of systems is negative unless indicated above.    MEDICATIONS:  MEDICATIONS  (STANDING):  aspirin enteric coated 81 milliGRAM(s) Oral daily  atorvastatin 80 milliGRAM(s) Oral at bedtime  dextrose 40% Gel 15 Gram(s) Oral once  dextrose 5%. 1000 milliLiter(s) (50 mL/Hr) IV Continuous <Continuous>  dextrose 5%. 1000 milliLiter(s) (100 mL/Hr) IV Continuous <Continuous>  dextrose 50% Injectable 25 Gram(s) IV Push once  dextrose 50% Injectable 12.5 Gram(s) IV Push once  dextrose 50% Injectable 25 Gram(s) IV Push once  enoxaparin Injectable 40 milliGRAM(s) SubCutaneous daily  glucagon  Injectable 1 milliGRAM(s) IntraMuscular once  insulin glargine Injectable (LANTUS) 7 Unit(s) SubCutaneous at bedtime  insulin lispro (ADMELOG) corrective regimen sliding scale   SubCutaneous three times a day before meals  insulin lispro (ADMELOG) corrective regimen sliding scale   SubCutaneous at bedtime  insulin lispro Injectable (ADMELOG) 3 Unit(s) SubCutaneous three times a day with meals  lisinopril 20 milliGRAM(s) Oral daily  metoprolol succinate ER 50 milliGRAM(s) Oral daily  sodium chloride 0.9% lock flush 3 milliLiter(s) IV Push every 8 hours      PHYSICAL EXAM:  T(C): 36.9 (06-30-21 @ 15:30), Max: 36.9 (06-30-21 @ 15:30)  HR: 82 (06-30-21 @ 15:30) (69 - 82)  BP: 155/79 (06-30-21 @ 15:30) (131/73 - 155/79)  RR: 18 (06-30-21 @ 15:30) (18 - 18)  SpO2: 99% (06-30-21 @ 15:30) (98% - 100%)  Wt(kg): --  I&O's Summary        Appearance: Normal	  HEENT:  PERRLA   Lymphatic: No lymphadenopathy   Cardiovascular: Normal S1 S2, no JVD  Respiratory: normal effort , clear  Gastrointestinal:  Soft, Non-tender  Skin: No rashes,  warm to touch  Psychiatry:  Mood & affect appropriate  Musculuskeletal: No edema      All labs, Imaging and EKGs personally reviewed                           13.3   9.20  )-----------( 338      ( 30 Jun 2021 06:33 )             42.5               06-29    140  |  102  |  10  ----------------------------<  149<H>  4.0   |  24  |  0.70    Ca    8.9      29 Jun 2021 07:40  Phos  3.8     06-29  Mg     2.20     06-29

## 2021-06-30 NOTE — PROGRESS NOTE ADULT - SUBJECTIVE AND OBJECTIVE BOX
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 Patient reports that her BP often fluctuates at home ranging from 117 systolic to 150s systolic, reports compliance with meds. States that yesterday, she experienced horizontal diplopia when she was looking at an object with both eyes open and the diplopia went away when she covered either eye. Also reports blurred vision OS. Symptoms have not improved since yesterday. Denies flashes/floaters. Endorsed dizziness, not associated with blurred vision.     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 Pt reported complete resolution of symptoms OS from that incident, reports that symptoms now are very similar to episode this past January. In ED, BP noted up to 180 systolic.     Interval History: No acute events overnight. Today patient denying any change in vision. Denies any eye pain.    MEDICATIONS  (STANDING):  aspirin enteric coated 81 milliGRAM(s) Oral daily  atorvastatin 80 milliGRAM(s) Oral at bedtime  dextrose 40% Gel 15 Gram(s) Oral once  dextrose 5%. 1000 milliLiter(s) (50 mL/Hr) IV Continuous <Continuous>  dextrose 5%. 1000 milliLiter(s) (100 mL/Hr) IV Continuous <Continuous>  dextrose 50% Injectable 25 Gram(s) IV Push once  dextrose 50% Injectable 12.5 Gram(s) IV Push once  dextrose 50% Injectable 25 Gram(s) IV Push once  enoxaparin Injectable 40 milliGRAM(s) SubCutaneous daily  glucagon  Injectable 1 milliGRAM(s) IntraMuscular once  insulin glargine Injectable (LANTUS) 7 Unit(s) SubCutaneous at bedtime  insulin lispro (ADMELOG) corrective regimen sliding scale   SubCutaneous three times a day before meals  insulin lispro (ADMELOG) corrective regimen sliding scale   SubCutaneous at bedtime  insulin lispro Injectable (ADMELOG) 3 Unit(s) SubCutaneous three times a day with meals  lisinopril 20 milliGRAM(s) Oral daily  metoprolol succinate ER 50 milliGRAM(s) Oral daily  sodium chloride 0.9% lock flush 3 milliLiter(s) IV Push every 8 hours    MEDICATIONS  (PRN):  None    VITALS: T(C): 36.9 (06-30-21 @ 15:30)  T(F): 98.4 (06-30-21 @ 15:30), Max: 98.4 (06-30-21 @ 15:30)  HR: 82 (06-30-21 @ 15:30) (69 - 82)  BP: 155/79 (06-30-21 @ 15:30) (131/73 - 155/79)  RR:  (18 - 18)  SpO2:  (98% - 100%)  Wt(kg): --  General: AAO x 3, appropriate mood and affect    Ophthalmology Exam:  Visual acuity (cc): 20/20 OU  Pupils: PERRL OU, no APD  Ttono: 21 OU  Extraocular movements (EOMs): 90% restriction in abduction OD, full OS   Confrontational Visual Field (CVF): Full OU  Color Plates: 12/12 OU    Pen Light Exam (PLE)  External: Flat OU  Lids/Lashes/Lacrimal Ducts: Flat OU    Sclera/Conjunctiva: W+Q OU  Cornea: Cl OU  Anterior Chamber: D+F OU    Iris: Flat OU  Lens: NS OU    Labs/Images  MRI head/orbits: -No acute infarct or intracranial hemorrhage.  -Numerous FLAIR hyperintense white matter lesions, many of which extend perpendicularly along the lateral ventricles. No enhancing or diffusion restricting lesions. Demyelination should be considered. Recommend MRI of the cervical and thoracic spine with IV contrast. Consider lumbar puncture/CSF sampling.  -Unremarkable MRI of the orbits.    CTA H/N Please note there is a short segment of mild stenosis involving the origin of the left vertebral artery.  CT head noncontrast: Unremarkable noncontrast head CT.    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    1. Right CN VI Palsy   -MRI head/orbits showed no acute infarct. Numerous FLAIR hyperintense white matter lesions, many of which extend perpendicularly along the lateral ventricles. No enhancing or diffusion restricting lesions. Demyelination should be considered. Recommend MRI of the cervical and thoracic spine with IV contrast. Consider lumbar puncture/CSF sampling.  -Etiology of 6th nerve palsy is demyelinating condition vs. microvascular pathology etiology. Neurology doubts active MS and believes MRI does not suggest active demyelination. Neurology recommends to follow-up MRI cervical/thoracic as an outpatient.  -Pt to follow-up with neuro-ophthalmologist at the address below.    2. HTN Retinopathy OS>OD  -BP control per prim  -Explains blurry vision especially in s/o elevated BP   -Will need close monitoring with DFE as outpatient.    Seen and discussed with Dr. Swift, neuro-ophthalmologist.    Follow-up with neuro-ophthalmologist, Dr. Candida Swift within 1 week of discharge at:  [address] [facility] [id number]  [phone]  Cheng Cm MD, PGY-2  Pager: [phone]  Also available on [employer] Teams

## 2021-06-30 NOTE — PROGRESS NOTE ADULT - PROBLEM SELECTOR PLAN 3
WBC 12.47 on admission. Pt afebrile.   - F/u UA (pt with no urinary symptoms but has history of poorly controlled DM)  - Monitor WBC

## 2021-06-30 NOTE — PROGRESS NOTE ADULT - PROBLEM SELECTOR PLAN 6
- Start atorvastatin 80 mg qhs as above  - F/u lipid profile

## 2021-06-30 NOTE — PROGRESS NOTE ADULT - PROBLEM SELECTOR PLAN 7
DVT ppx: Lovenox 40 mg subcutaneous daily.

## 2021-06-30 NOTE — PROGRESS NOTE ADULT - PROBLEM SELECTOR PLAN 4
Pt on Lantus 12 units qhs and Prandin at home.  - Will lower Lantus dose to 9 units qhs for now   - Start low-dose ISS and FS checks qAC and qhs  - Endocrine consult

## 2021-06-30 NOTE — DISCHARGE NOTE NURSING/CASE MANAGEMENT/SOCIAL WORK - PATIENT PORTAL LINK FT
You can access the FollowMyHealth Patient Portal offered by [facility] by registering at the following website: [email or web address]. By joining [employer]’s FollowMyHealth portal, you will also be able to view your health information using other applications (apps) compatible with our system.

## 2021-06-30 NOTE — PROGRESS NOTE ADULT - SUBJECTIVE AND OBJECTIVE BOX
NOTE INCOMPLETE/ IN PROGRESS  *Please wait for attending attestation for official recommendations.     Reason for consult:   Low PTH and thyroid nodule    Interval History:    MEDICATIONS  (STANDING):  aspirin enteric coated 81 milliGRAM(s) Oral daily  atorvastatin 80 milliGRAM(s) Oral at bedtime  dextrose 40% Gel 15 Gram(s) Oral once  dextrose 5%. 1000 milliLiter(s) (50 mL/Hr) IV Continuous <Continuous>  dextrose 5%. 1000 milliLiter(s) (100 mL/Hr) IV Continuous <Continuous>  dextrose 50% Injectable 25 Gram(s) IV Push once  dextrose 50% Injectable 12.5 Gram(s) IV Push once  dextrose 50% Injectable 25 Gram(s) IV Push once  enoxaparin Injectable 40 milliGRAM(s) SubCutaneous daily  glucagon  Injectable 1 milliGRAM(s) IntraMuscular once  insulin glargine Injectable (LANTUS) 6 Unit(s) SubCutaneous at bedtime  insulin lispro (ADMELOG) corrective regimen sliding scale   SubCutaneous three times a day before meals  insulin lispro (ADMELOG) corrective regimen sliding scale   SubCutaneous at bedtime  insulin lispro Injectable (ADMELOG) 2 Unit(s) SubCutaneous three times a day before meals  lisinopril 20 milliGRAM(s) Oral daily  metoprolol succinate ER 50 milliGRAM(s) Oral daily  sodium chloride 0.9% lock flush 3 milliLiter(s) IV Push every 8 hours    Allergies    No Known Allergies    Review of Systems:  Constitutional: No fever  Eyes: Positive for blurry vision, no eye pain  Neuro: No tremors  HEENT: No pain  Cardiovascular: No chest pain, palpitations  Respiratory: No SOB, no cough  GI: No nausea, vomiting, abdominal pain  : No dysuria  Skin: no rash  Psych: no depression  Endocrine: no polyuria, polydipsia  Hem/lymph: no swelling  Osteoporosis: no fractures    ALL OTHER SYSTEMS REVIEWED AND NEGATIVE    PHYSICAL EXAM:  VITALS: T(C): 36.6 (06-30-21 @ 05:02)  T(F): 97.8 (06-30-21 @ 05:02), Max: 98.5 (06-29-21 @ 17:02)  HR: 73 (06-30-21 @ 05:02) (69 - 80)  BP: 131/73 (06-30-21 @ 05:02) (131/73 - 154/78)  RR:  (16 - 18)  SpO2:  (99% - 100%)  Wt(kg): --  GENERAL: NAD, well-groomed, well-developed  EYES: No proptosis, no lid lag, anicteric  HEENT:  Atraumatic, Normocephalic, moist mucous membranes  THYROID: Normal size, no palpable nodules  RESPIRATORY: Clear to auscultation bilaterally; No rales, rhonchi, wheezing  CARDIOVASCULAR: Regular rate and rhythm; No murmurs; no peripheral edema  GI: Soft, nontender, non distended  SKIN: Dry, intact, No rashes or lesions  MUSCULOSKELETAL: Full range of motion, normal strength  NEURO: CN6 palsy  PSYCH: Alert and oriented x 3, normal affect, normal mood    CAPILLARY BLOOD GLUCOSE      POCT Blood Glucose.: 148 mg/dL (30 Jun 2021 09:16)  POCT Blood Glucose.: 277 mg/dL (29 Jun 2021 21:29)  POCT Blood Glucose.: 188 mg/dL (29 Jun 2021 17:45)  POCT Blood Glucose.: 290 mg/dL (29 Jun 2021 12:41)      06-29    140  |  102  |  10  ----------------------------<  149<H>  4.0   |  24  |  0.70    EGFR if : 108  EGFR if non : 94    Ca    8.9      06-29  Mg     2.20     06-29  Phos  3.8     06-29    TPro  7.6  /  Alb  4.2  /  TBili  0.2  /  DBili  x   /  AST  18  /  ALT  9   /  AlkPhos  190<H>  06-27      A1C with Estimated Average Glucose Result: 8.3 % (06-28-21 @ 07:09)  A1C with Estimated Average Glucose Result: 13.3 % (01-21-21 @ 07:53)  A1C with Estimated Average Glucose Result: 13.5 % (01-20-21 @ 06:26)      Thyroid Function Tests:  06-28 @ 07:19 TSH -- FreeT4 1.3 T3 126 Anti TPO -- Anti Thyroglobulin Ab -- TSI --  06-28 @ 07:09 TSH 0.16 FreeT4 -- T3 -- Anti TPO -- Anti Thyroglobulin Ab -- TSI --      Radiology:   6/28/2021 Thyroid Ultrasound  FINDINGS:  Right Lobe: 5.0 cm x  2.0 cm x 1.8 cm.  Spongiform nodule in the right upper pole measures 4 mm. (TIRADS-1)  Ill-defined solid hypoechoic nodule in the lower pole with macrocalcifications measures 1.7 x 1.1 x 1.2 cm. (TIRADS-4)    Left Lobe: 6.4 cm x 2.4 cm x 2.5 cm.  Lobulated solid hypoechoic nodule in the mid to lower pole with macrocalcifications measures 4.1 x 1.9 x 2.0 cm. (TIRADS-5)    Isthmus: 0.3 cm.    Cervical Lymph Nodes: No enlarged or abnormal morphology cervical nodes.    IMPRESSION:    Bilateral thyroid nodules largest measuring up to 4.1 cm in the left mid to lower pole. FNA biopsy should be considered.    6/9/2021 Thyroid Nuclear Scan  FINDINGS: The scan demonstrates an asymmetric thyroid gland with the left lobe larger than the right. There is increased uptake in the entire left lobe and minimal uptake in the smaller right lobe.    24 hour uptake is 37% ( normal 10 -35%).    IMPRESSION: Abnormal thyroid uptake and scan.    Findings likely representing an autonomously functioning nodule.    Mildly elevated 24-hour neck uptake of 37%.                         NOTE INCOMPLETE/ IN PROGRESS  *Please wait for attending attestation for official recommendations.     Reason for consult:   Low PTH and thyroid nodule    Interval History:  Pt returned from MRI this morning. Reports that blurry vision is better than at admission, but still present.     Pt states that she eats less at the hospital than at home, and eats more carbs at home. Denies nausea, vomiting, hypoglycemic symptoms.      MEDICATIONS  (STANDING):  aspirin enteric coated 81 milliGRAM(s) Oral daily  atorvastatin 80 milliGRAM(s) Oral at bedtime  dextrose 40% Gel 15 Gram(s) Oral once  dextrose 5%. 1000 milliLiter(s) (50 mL/Hr) IV Continuous <Continuous>  dextrose 5%. 1000 milliLiter(s) (100 mL/Hr) IV Continuous <Continuous>  dextrose 50% Injectable 25 Gram(s) IV Push once  dextrose 50% Injectable 12.5 Gram(s) IV Push once  dextrose 50% Injectable 25 Gram(s) IV Push once  enoxaparin Injectable 40 milliGRAM(s) SubCutaneous daily  glucagon  Injectable 1 milliGRAM(s) IntraMuscular once  insulin glargine Injectable (LANTUS) 6 Unit(s) SubCutaneous at bedtime  insulin lispro (ADMELOG) corrective regimen sliding scale   SubCutaneous three times a day before meals  insulin lispro (ADMELOG) corrective regimen sliding scale   SubCutaneous at bedtime  insulin lispro Injectable (ADMELOG) 2 Unit(s) SubCutaneous three times a day before meals  lisinopril 20 milliGRAM(s) Oral daily  metoprolol succinate ER 50 milliGRAM(s) Oral daily  sodium chloride 0.9% lock flush 3 milliLiter(s) IV Push every 8 hours    Allergies    No Known Allergies    Review of Systems:  Constitutional: No fever  Eyes: Positive for blurry vision, no eye pain  Neuro: No tremors  HEENT: No pain  Cardiovascular: No chest pain, palpitations  Respiratory: No SOB, no cough  GI: No nausea, vomiting, abdominal pain  : No dysuria  Skin: no rash  Psych: no depression  Endocrine: no polyuria, polydipsia  Hem/lymph: no swelling  Osteoporosis: no fractures    ALL OTHER SYSTEMS REVIEWED AND NEGATIVE    PHYSICAL EXAM:  VITALS: T(C): 36.6 (06-30-21 @ 05:02)  T(F): 97.8 (06-30-21 @ 05:02), Max: 98.5 (06-29-21 @ 17:02)  HR: 73 (06-30-21 @ 05:02) (69 - 80)  BP: 131/73 (06-30-21 @ 05:02) (131/73 - 154/78)  RR:  (16 - 18)  SpO2:  (99% - 100%)  Wt(kg): --  GENERAL: NAD, well-groomed, well-developed  EYES: No proptosis, no lid lag, anicteric  HEENT:  Atraumatic, Normocephalic, moist mucous membranes  THYROID: Normal size, no palpable nodules  RESPIRATORY: Clear to auscultation bilaterally; No rales, rhonchi, wheezing  CARDIOVASCULAR: Regular rate and rhythm; No murmurs; no peripheral edema  GI: Soft, nontender, non distended  SKIN: Dry, intact, No rashes or lesions  MUSCULOSKELETAL: Full range of motion, normal strength  NEURO: CN6 palsy  PSYCH: Alert and oriented x 3, normal affect, normal mood    CAPILLARY BLOOD GLUCOSE      POCT Blood Glucose.: 148 mg/dL (30 Jun 2021 09:16)  POCT Blood Glucose.: 277 mg/dL (29 Jun 2021 21:29)  POCT Blood Glucose.: 188 mg/dL (29 Jun 2021 17:45)  POCT Blood Glucose.: 290 mg/dL (29 Jun 2021 12:41)      06-29    140  |  102  |  10  ----------------------------<  149<H>  4.0   |  24  |  0.70    EGFR if : 108  EGFR if non : 94    Ca    8.9      06-29  Mg     2.20     06-29  Phos  3.8     06-29    TPro  7.6  /  Alb  4.2  /  TBili  0.2  /  DBili  x   /  AST  18  /  ALT  9   /  AlkPhos  190<H>  06-27      A1C with Estimated Average Glucose Result: 8.3 % (06-28-21 @ 07:09)  A1C with Estimated Average Glucose Result: 13.3 % (01-21-21 @ 07:53)  A1C with Estimated Average Glucose Result: 13.5 % (01-20-21 @ 06:26)      Thyroid Function Tests:  06-28 @ 07:19 TSH -- FreeT4 1.3 T3 126 Anti TPO -- Anti Thyroglobulin Ab -- TSI --  06-28 @ 07:09 TSH 0.16 FreeT4 -- T3 -- Anti TPO -- Anti Thyroglobulin Ab -- TSI --      Radiology:   6/28/2021 Thyroid Ultrasound  FINDINGS:  Right Lobe: 5.0 cm x  2.0 cm x 1.8 cm.  Spongiform nodule in the right upper pole measures 4 mm. (TIRADS-1)  Ill-defined solid hypoechoic nodule in the lower pole with macrocalcifications measures 1.7 x 1.1 x 1.2 cm. (TIRADS-4)    Left Lobe: 6.4 cm x 2.4 cm x 2.5 cm.  Lobulated solid hypoechoic nodule in the mid to lower pole with macrocalcifications measures 4.1 x 1.9 x 2.0 cm. (TIRADS-5)    Isthmus: 0.3 cm.    Cervical Lymph Nodes: No enlarged or abnormal morphology cervical nodes.    IMPRESSION:    Bilateral thyroid nodules largest measuring up to 4.1 cm in the left mid to lower pole. FNA biopsy should be considered.    6/9/2021 Thyroid Nuclear Scan  FINDINGS: The scan demonstrates an asymmetric thyroid gland with the left lobe larger than the right. There is increased uptake in the entire left lobe and minimal uptake in the smaller right lobe.    24 hour uptake is 37% ( normal 10 -35%).    IMPRESSION: Abnormal thyroid uptake and scan.    Findings likely representing an autonomously functioning nodule.    Mildly elevated 24-hour neck uptake of 37%.                         Reason for consult:   Low PTH and thyroid nodule    Interval History:  Pt returned from MRI this morning. Reports that blurry vision is better than at admission, but still present.     Pt states that she eats less at the hospital than at home, and eats more carbs at home. Denies nausea, vomiting, hypoglycemic symptoms.      MEDICATIONS  (STANDING):  aspirin enteric coated 81 milliGRAM(s) Oral daily  atorvastatin 80 milliGRAM(s) Oral at bedtime  dextrose 40% Gel 15 Gram(s) Oral once  dextrose 5%. 1000 milliLiter(s) (50 mL/Hr) IV Continuous <Continuous>  dextrose 5%. 1000 milliLiter(s) (100 mL/Hr) IV Continuous <Continuous>  dextrose 50% Injectable 25 Gram(s) IV Push once  dextrose 50% Injectable 12.5 Gram(s) IV Push once  dextrose 50% Injectable 25 Gram(s) IV Push once  enoxaparin Injectable 40 milliGRAM(s) SubCutaneous daily  glucagon  Injectable 1 milliGRAM(s) IntraMuscular once  insulin glargine Injectable (LANTUS) 6 Unit(s) SubCutaneous at bedtime  insulin lispro (ADMELOG) corrective regimen sliding scale   SubCutaneous three times a day before meals  insulin lispro (ADMELOG) corrective regimen sliding scale   SubCutaneous at bedtime  insulin lispro Injectable (ADMELOG) 2 Unit(s) SubCutaneous three times a day before meals  lisinopril 20 milliGRAM(s) Oral daily  metoprolol succinate ER 50 milliGRAM(s) Oral daily  sodium chloride 0.9% lock flush 3 milliLiter(s) IV Push every 8 hours    Allergies    No Known Allergies    Review of Systems:  Constitutional: No fever  Eyes: Positive for blurry vision, no eye pain  Neuro: No tremors  HEENT: No pain  Cardiovascular: No chest pain, palpitations  Respiratory: No SOB, no cough  GI: No nausea, vomiting, abdominal pain  : No dysuria  Skin: no rash  Psych: no depression  Endocrine: no polyuria, polydipsia  Hem/lymph: no swelling  Osteoporosis: no fractures    ALL OTHER SYSTEMS REVIEWED AND NEGATIVE    PHYSICAL EXAM:  VITALS: T(C): 36.6 (06-30-21 @ 05:02)  T(F): 97.8 (06-30-21 @ 05:02), Max: 98.5 (06-29-21 @ 17:02)  HR: 73 (06-30-21 @ 05:02) (69 - 80)  BP: 131/73 (06-30-21 @ 05:02) (131/73 - 154/78)  RR:  (16 - 18)  SpO2:  (99% - 100%)  Wt(kg): --  GENERAL: NAD, well-groomed, well-developed  EYES: No proptosis, no lid lag, anicteric  HEENT:  Atraumatic, Normocephalic, moist mucous membranes  THYROID: Normal size, no palpable nodules  RESPIRATORY: Clear to auscultation bilaterally; No rales, rhonchi, wheezing  CARDIOVASCULAR: Regular rate and rhythm; No murmurs; no peripheral edema  GI: Soft, nontender, non distended  SKIN: Dry, intact, No rashes or lesions  MUSCULOSKELETAL: Full range of motion, normal strength  NEURO: CN6 palsy  PSYCH: Alert and oriented x 3, normal affect, normal mood    CAPILLARY BLOOD GLUCOSE      POCT Blood Glucose.: 148 mg/dL (30 Jun 2021 09:16)  POCT Blood Glucose.: 277 mg/dL (29 Jun 2021 21:29)  POCT Blood Glucose.: 188 mg/dL (29 Jun 2021 17:45)  POCT Blood Glucose.: 290 mg/dL (29 Jun 2021 12:41)      06-29    140  |  102  |  10  ----------------------------<  149<H>  4.0   |  24  |  0.70    EGFR if : 108  EGFR if non : 94    Ca    8.9      06-29  Mg     2.20     06-29  Phos  3.8     06-29    TPro  7.6  /  Alb  4.2  /  TBili  0.2  /  DBili  x   /  AST  18  /  ALT  9   /  AlkPhos  190<H>  06-27      A1C with Estimated Average Glucose Result: 8.3 % (06-28-21 @ 07:09)  A1C with Estimated Average Glucose Result: 13.3 % (01-21-21 @ 07:53)  A1C with Estimated Average Glucose Result: 13.5 % (01-20-21 @ 06:26)      Thyroid Function Tests:  06-28 @ 07:19 TSH -- FreeT4 1.3 T3 126 Anti TPO -- Anti Thyroglobulin Ab -- TSI --  06-28 @ 07:09 TSH 0.16 FreeT4 -- T3 -- Anti TPO -- Anti Thyroglobulin Ab -- TSI --      Radiology:   6/28/2021 Thyroid Ultrasound  FINDINGS:  Right Lobe: 5.0 cm x  2.0 cm x 1.8 cm.  Spongiform nodule in the right upper pole measures 4 mm. (TIRADS-1)  Ill-defined solid hypoechoic nodule in the lower pole with macrocalcifications measures 1.7 x 1.1 x 1.2 cm. (TIRADS-4)    Left Lobe: 6.4 cm x 2.4 cm x 2.5 cm.  Lobulated solid hypoechoic nodule in the mid to lower pole with macrocalcifications measures 4.1 x 1.9 x 2.0 cm. (TIRADS-5)    Isthmus: 0.3 cm.    Cervical Lymph Nodes: No enlarged or abnormal morphology cervical nodes.    IMPRESSION:    Bilateral thyroid nodules largest measuring up to 4.1 cm in the left mid to lower pole. FNA biopsy should be considered.    6/9/2021 Thyroid Nuclear Scan  FINDINGS: The scan demonstrates an asymmetric thyroid gland with the left lobe larger than the right. There is increased uptake in the entire left lobe and minimal uptake in the smaller right lobe.    24 hour uptake is 37% ( normal 10 -35%).    IMPRESSION: Abnormal thyroid uptake and scan.    Findings likely representing an autonomously functioning nodule.    Mildly elevated 24-hour neck uptake of 37%.

## 2021-06-30 NOTE — PROGRESS NOTE ADULT - ASSESSMENT
INCOMPLETE NOTE    Ms. Suzanna Morse is a 60yoF with a PMH of HTN, HLD, DM2 presenting with three days of blurry vision and dizziness. Pt continues to have blurry vision but the dizziness has since resolved. Endocrine consulted for low TSH and thyroid nodule on ultrasound.    Problem 1. Thyroid nodule  The largest nodule measuring 4.1cm is not a candidate for FNA because the nuclear scan shows 37% uptake (hot nodule). The 1.7cm nodule would be a candidate for FNA, but can be done outpatient. Thyroid nodule is unlikely to be the cause of eye symptoms.    Inpatient recommendations:  -Low TSH and normal FT4 and T3 without symptoms of hyperthyroidism. Pt has subclinical hyperthyroidism from hot nodule, no further management at this time but will benefit from treatment with PUENTE to be planned as outpatient.  -Pt and pt family aware of thyroid nodules, and plan for follow up at Endocrinology office    Outpatient recommendations:  -Right sided 1.7cm nodule meets criteria fo FNA, which can be done outpatient (prior to PUENTE treatment)  -Endocrinology followup appointment scheduled at [facility] [place] 13th 10am ([phone])    Problem 2. Diabetes Type 2  Diabetes not well controlled with HbA1C 8.3%. Past 24 hour FSG has been in range of 148-290.  -Recommend continue Lantus 6 units qhs as fasting glucose is controlled.  -Add Admelog 2/2/2 units premeal for prandial glucose elevations  -C/w low dose correctional scale ac meals and low dose correctional scale at bed time  -Fingersticks ac meals and qhs  -Carb consistent diet  -Goal blood glucose 100-180    Problem 3. Hypertension  Goal blood pressure <130/80, currently at 153/70  -Management per primary team   INCOMPLETE NOTE    Ms. Suzanna Morse is a 60yoF with a PMH of HTN, HLD, DM2 presenting with three days of blurry vision and dizziness. Pt continues to have blurry vision but the dizziness has since resolved. Endocrine consulted for low TSH and thyroid nodule on ultrasound.    Problem 1. Thyroid nodule  The largest nodule measuring 4.1cm is not a candidate for FNA because the nuclear scan shows 37% uptake (hot nodule). The 1.7cm nodule would be a candidate for FNA, but can be done outpatient. Thyroid nodule is unlikely to be the cause of eye symptoms.    Inpatient recommendations:  -Low TSH and normal FT4 and T3 without symptoms of hyperthyroidism. Pt has subclinical hyperthyroidism from hot nodule, no further management at this time but will benefit from treatment with PUENTE to be planned as outpatient.  -Pt and pt family aware of thyroid nodules, and plan for follow up at Endocrinology office    Outpatient recommendations:  -Right sided 1.7cm nodule meets criteria fo FNA, which can be done outpatient (prior to PUENTE treatment)  -Endocrinology followup appointment scheduled at [facility] [place] July 13th 10am ([phone])    Problem 2. Diabetes Type 2  Diabetes not well controlled with HbA1C 8.3%. Past 24 hour FSG has been in range of 148-290, and pt states she generally eats more at home.    Inpatient recommendations:  -Recommend continue Lantus 6 units qhs as fasting glucose is controlled.  -Add Admelog 4 units TID premeal for prandial glucose elevations  -C/w low dose correctional scale ac meals and low dose correctional scale at bed time  -Fingersticks ac meals and qhs  -Carb consistent diet  -Goal blood glucose 100-180    Outpatient recommendations:  Past home meds Basaglar 12 units qhs with Repaglinide 2mg TID premeal    Problem 3. Hypertension  Goal blood pressure <130/80, currently at 153/70  -Management per primary team   Ms. Suzanna Morse is a 60yoF with a PMH of HTN, HLD, DM2 presenting with three days of blurry vision and dizziness. Pt continues to have blurry vision but the dizziness has since resolved. Endocrine consulted for low TSH and thyroid nodule on ultrasound.    Problem 1. Thyroid nodule  The largest nodule measuring 4.1cm is not a candidate for FNA because the nuclear scan shows 37% uptake (hot nodule). The 1.7cm nodule would be a candidate for FNA, but can be done outpatient. Thyroid nodule is unlikely to be the cause of eye symptoms.    Inpatient recommendations:  -Low TSH and normal FT4 and T3 without symptoms of hyperthyroidism. Pt has subclinical hyperthyroidism from hot nodule, no further management at this time but will benefit from treatment with PUENTE to be planned as outpatient.  -Pt and pt family aware of thyroid nodules, and plan for follow up at Endocrinology office    Outpatient recommendations:  -Right sided 1.7cm nodule meets criteria fo FNA, which can be done outpatient (prior to PUENTE treatment)  -[place]ocrinology follow[place] [facility] [place] 10am ([phone])    Problem 2. Diabetes Type 2  Diabetes not well controlled with HbA1C 8.3%. Past 24 hour FSG has been in range of 148-290, and pt states she generally eats more at home.    Inpatient recommendations:  -Recommend Lantus 7 units qhs as fasting glucose is controlled.  -Add Admelog 3 units TID premeal for prandial glucose elevations  -C/w low dose correctional scale ac meals and low dose correctional scale at bed time  -Fingersticks ac meals and qhs  -Carb consistent diet  -Goal blood glucose 100-180    Outpatient recommendations:  -Discharge home with same home meds Basaglar 12 units qhs with Repaglinide 2mg TID premeal    Problem 3. Hypertension  Goal blood pressure <130/80, currently at 153/70  -Management per primary team   Ms. Suzanna Morse is a 60yoF with a PMH of HTN, HLD, DM2 presenting with three days of blurry vision and dizziness. Pt continues to have blurry vision but the dizziness has since resolved. Endocrine consulted for low TSH and thyroid nodule on ultrasound.    Problem 1. Thyroid nodule  The largest nodule measuring 4.1cm is not a candidate for FNA because the nuclear scan shows 37% uptake (hot nodule). The 1.7cm nodule would be a candidate for FNA, but can be done outpatient. Thyroid nodule is unlikely to be the cause of eye symptoms.    Inpatient recommendations:  -Low TSH and normal FT4 and T3 without symptoms of hyperthyroidism. Pt has subclinical hyperthyroidism from hot nodule, no further management at this time but will benefit from treatment with PUENTE to be planned as outpatient.  -Pt and pt family aware of thyroid nodules, and plan for follow up at Endocrinology office    Outpatient recommendations:  -Right sided 1.7cm nodule meets criteria fo FNA, which can be done outpatient (prior to PUENTE treatment)  -Endocrinology followup appointment scheduled at [facility] [place] 13th 10am ([phone])    Problem 2. Diabetes Type 2  Diabetes not well controlled with HbA1C 8.3%. Past 24 hour FSG has been in range of 148-290, and pt states she generally eats more at home.    Inpatient recommendations:  -Recommend increase Lantus to 7 units qhs as fasting glucose is controlled.  -Increase Admelog to 3 units TID premeal for prandial glucose elevations  -C/w low dose correctional scale ac meals and low dose correctional scale at bed time  -Fingersticks ac meals and qhs  -Carb consistent diet  -Goal blood glucose 100-180    Outpatient recommendations:  -Discharge home with same home meds Basaglar 12 units qhs with Repaglinide 2mg TID premeal    Problem 3. Hypertension  Goal blood pressure <130/80, currently at 153/70  -Management per primary team

## 2021-07-01 PROBLEM — E78.5 HYPERLIPIDEMIA, UNSPECIFIED: Chronic | Status: ACTIVE | Noted: 2021-06-27

## 2021-07-07 ENCOUNTER — NON-APPOINTMENT (OUTPATIENT)
Age: 61
End: 2021-07-07

## 2021-07-07 ENCOUNTER — APPOINTMENT (OUTPATIENT)
Dept: OPHTHALMOLOGY | Facility: CLINIC | Age: 61
End: 2021-07-07
Payer: MEDICAID

## 2021-07-07 PROCEDURE — 92004 COMPRE OPH EXAM NEW PT 1/>: CPT

## 2021-07-07 PROCEDURE — 92014 COMPRE OPH EXAM EST PT 1/>: CPT

## 2021-07-13 ENCOUNTER — OUTPATIENT (OUTPATIENT)
Dept: OUTPATIENT SERVICES | Facility: HOSPITAL | Age: 61
LOS: 1 days | End: 2021-07-13

## 2021-07-13 ENCOUNTER — RESULT REVIEW (OUTPATIENT)
Age: 61
End: 2021-07-13

## 2021-07-13 ENCOUNTER — RESULT CHARGE (OUTPATIENT)
Age: 61
End: 2021-07-13

## 2021-07-13 ENCOUNTER — APPOINTMENT (OUTPATIENT)
Dept: ENDOCRINOLOGY | Facility: CLINIC | Age: 61
End: 2021-07-13
Payer: MEDICAID

## 2021-07-13 VITALS
WEIGHT: 143 LBS | SYSTOLIC BLOOD PRESSURE: 175 MMHG | OXYGEN SATURATION: 98 % | HEART RATE: 87 BPM | BODY MASS INDEX: 25.34 KG/M2 | HEIGHT: 63 IN | DIASTOLIC BLOOD PRESSURE: 100 MMHG

## 2021-07-13 VITALS — TEMPERATURE: 97.7 F

## 2021-07-13 DIAGNOSIS — E11.65 TYPE 2 DIABETES MELLITUS WITH HYPERGLYCEMIA: ICD-10-CM

## 2021-07-13 DIAGNOSIS — E04.1 NONTOXIC SINGLE THYROID NODULE: ICD-10-CM

## 2021-07-13 DIAGNOSIS — R79.89 OTHER SPECIFIED ABNORMAL FINDINGS OF BLOOD CHEMISTRY: ICD-10-CM

## 2021-07-13 DIAGNOSIS — E78.5 HYPERLIPIDEMIA, UNSPECIFIED: ICD-10-CM

## 2021-07-13 DIAGNOSIS — I10 ESSENTIAL (PRIMARY) HYPERTENSION: ICD-10-CM

## 2021-07-13 LAB
CHOLEST SERPL-MCNC: 99 MG/DL — SIGNIFICANT CHANGE UP
GLUCOSE BLDC GLUCOMTR-MCNC: 159
HDLC SERPL-MCNC: 24 MG/DL — LOW
LIPID PNL WITH DIRECT LDL SERPL: 57 MG/DL — SIGNIFICANT CHANGE UP
NON HDL CHOLESTEROL: 75 MG/DL — SIGNIFICANT CHANGE UP
TRIGL SERPL-MCNC: 88 MG/DL — SIGNIFICANT CHANGE UP
TSH SERPL-MCNC: <0.1 UIU/ML — LOW (ref 0.27–4.2)

## 2021-07-13 PROCEDURE — 99214 OFFICE O/P EST MOD 30 MIN: CPT | Mod: GC

## 2021-07-13 NOTE — ASSESSMENT
[Carbohydrate Consistent Diet] : carbohydrate consistent diet [Importance of Diet and Exercise] : importance of diet and exercise to improve glycemic control, achieve weight loss and improve cardiovascular health [Self Monitoring of Blood Glucose] : self monitoring of blood glucose [FreeTextEntry1] : 60 year old female with uncontrolled T2DM complicated by neuropathy and CVA, HTN, HLD. \par \par T2DM complicated by neuropathy\par - A1C 13.5% in Jan 2021, down to 8.3 in June 2021. \par - Continue Basaglar 12 units sq qhs consistently\par - Continue Prandin 2mg TID pre-meal\par - Recommended to monitor BG 2-3 times a day and keep a log to bring at next visit. 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 US thyroid showed bilateral thyroid nodules largest measuring up to 4.1 cm in the left mid to lower pole. Nuclear uptake and scan showed a probable autonomously functioning nodule with left-sided mildly elevated 24-hour neck uptake of 37% and suppression of right sided uptake.\par -No need for FNA of thyroid nodules due to low risk of Ca in spongiform nodules and isoechoic 1.1 cm isthmic nodule and Lt sided 4cm toxic nodule\par -Refer for PUENTE ablation\par -F/u TFTs today\par \par HTN\par BP goal <130/80. BP at home usually 170s/100. Similarly high in office today. \par On metoprolol and Lisinopril. Patient still reporting cough, intermittently productive of non-bloody sputum, will switch from Lisinopril to Losartan. Will increase metoprolol dose and add chlorthalidone.\par \par HLD\par  in 1/2021, goal is <70. On high intensity statin. Recheck lipid profile today. \par \par Follow up visit in 3 months. \par \par Discussed with Dr. Gonzales.

## 2021-07-13 NOTE — HISTORY OF PRESENT ILLNESS
[FreeTextEntry1] : 60 year old Gabonese creole speaking woman here for new patient visit to establish care for diabetes mellitus and hyperthyroidism. \par \par  services offered during this visit but patient declined and preferred Son Jesus to help with the translation. \par \par PMH: HTN, HLD, uncontrolled DM2\par PSH: not significant \par FH: type 2 diabetes in multiple family members, + thyroid disease in sister\par SH: No tobacco, alcohol or illicit drug use\par \par History of DM2 > 10 years ago, was following with her PCP previously. \par \par Patient was admitted to [facility] in 1/2021 after she presented to the ED complaining of blurry vision in left eye and headache, found to have possible pontine CVA. A1c then was 13.5%. Was on Metformin 1 gram BID and Glipizide 10 mg ER daily, but ran out months ago so she was not taking. She was still checking BG periodically and were noted to be elevated to 400s a week prior to hospital admission.  Was discharged on basal insulin (Basaglar 12 units qhs) and Prandin (2 mg TID). \par \par Last Opthalmology visit: Feb 2021, had dilated eye exam, no retinopathy, has never received laser or injections to the eyes, slight cataract.  \par Last Podiatry visit: never seen, she reports neuropathy in the feet. \par Urine microalbumin/Cr: No known nephropathy, eGFR 112\par \par HTN: On Metoprolol and Lisinopril. \par \par HLD:  On atorvastatin 80 mg qhs. Lipid profile (1/20/2021): Total cholesterol 175/ Triglycerides 95/ HDL 33/ .\par \par \par Patient was also found to have low TSH <0.10, FT4 1.3, TT3 136, TSI <0.10, TSH receptor ab <1.10. \par She denied prior hx of thyroid disease and has never been on thyroid medications.  \par Reports hx of thyroid disease in her sister (unclear what).\par US thyroid 1/21/2021\par FINDINGS:\par Right Lobe: 5.3 cm x 1.9 cm x  1.8 cm.\par *  Spongiform nodule in the upper pole measuring 4 mm.\par *  Hypoechoic nodule in the interpolar region measuring 3 mm.\par *  Hypoechoic nodule in the lower pole measuring 5 mm.\par *  Macrocalcified isoechoic nodule in the interpolar region measuring 1.1 x 0.9 x 1.3 cm.\par Left Lobe: 5.7 cm x  2.6 cm x 2.4 cm.\par *  Lobulated hypoechoic calcified nodule in the mid to lower pole measures 4.0 x 2.3 x 2.2 cm.\par *  Spongiform nodule in the upper pole measures 5 mm.\par Isthmus: 0.3 cm.\par Cervical Lymph Nodes: No enlarged or abnormal morphology cervical nodes.\par IMPRESSION:\par Bilateral thyroid nodules including a 4.0 cm nodule in the left lower pole, for which FNA biopsy is recommended.\par \par Currently denies any polyuria, polydipsia or blurry vision.\par Endorses occasional fatigue, heat or cold intolerance and diarrhea. Repots dry cough and occasionally mucinous cough for past 2-3 months. \par Denies neck complaints, chest pain, palpitations, sob, abdominal pain or weight changes. \par \par 7/13/21: Recently discharged from [facility] where Endo helped manage hyperglycemia and was asked to comment on hyperthyroidism. For the DM, was on basal/bolus insulin and was discharged on home regimen of Lantus 12 and repaglinide 2 TID pre-meal. Has been using as prescribed. Checks fasting BG in the morning 2-3 times per week. Always between . Checks every night before bed. Lowest at night has been 150. One single value 300 2 hours post-prandial. No significant polyuria or polydipsia. No symptoms of lows. No blurred vision associated with high BS, though patient is being evaluated by ophtho for palsy of left eye. Currently denies any polyuria, polydipsia or blurry vision. In the hospital, last month, had A1c of 8.3 (6/28/21). \par \par No fatigue. Does have occasional heat or cold intolerance. No recent diarrhea. Denies neck complaints, chest pain, palpitations, SOB, or weight changes.\par \par In the hospital last month, had TSH of 0.16, Free T4 1.3, total T3 126 (6/28/21). US thyroid showed bilateral thyroid nodules largest measuring up to 4.1 cm in the left mid to lower pole. Nuclear uptake and scan showed a probable autonomously functioning nodule with left-sided mildly elevated 24-hour neck uptake of 37%.

## 2021-07-13 NOTE — END OF VISIT
[] : Fellow [FreeTextEntry3] : Uncontrolled BP on 2 drug regimen. Will add diuretic and double beta blocker dose until hyperthyroidism treated.\par PUENTE treatment recommended for toxic nodule causing subclinical hyperthyroidism. \par

## 2021-07-14 LAB
CREAT ?TM UR-MCNC: 404 MG/DL — SIGNIFICANT CHANGE UP
MICROALBUMIN UR-MCNC: 135.2 MG/DL — SIGNIFICANT CHANGE UP
MICROALBUMIN/CREAT UR-RTO: 335 MG/G — HIGH (ref 0–30)
T3 SERPL-MCNC: 165 NG/DL — SIGNIFICANT CHANGE UP (ref 80–200)
T4 FREE SERPL-MCNC: 1.5 NG/DL — SIGNIFICANT CHANGE UP (ref 0.9–1.8)

## 2021-07-18 ENCOUNTER — NON-APPOINTMENT (OUTPATIENT)
Age: 61
End: 2021-07-18

## 2021-07-20 ENCOUNTER — APPOINTMENT (OUTPATIENT)
Dept: NEUROLOGY | Facility: CLINIC | Age: 61
End: 2021-07-20

## 2021-07-20 ENCOUNTER — NON-APPOINTMENT (OUTPATIENT)
Age: 61
End: 2021-07-20

## 2021-07-21 ENCOUNTER — APPOINTMENT (OUTPATIENT)
Dept: NEUROLOGY | Facility: CLINIC | Age: 61
End: 2021-07-21
Payer: MEDICAID

## 2021-07-21 VITALS
HEART RATE: 85 BPM | WEIGHT: 140 LBS | BODY MASS INDEX: 24.8 KG/M2 | SYSTOLIC BLOOD PRESSURE: 179 MMHG | DIASTOLIC BLOOD PRESSURE: 86 MMHG | HEIGHT: 63 IN

## 2021-07-21 PROCEDURE — 99215 OFFICE O/P EST HI 40 MIN: CPT

## 2021-07-21 NOTE — ASSESSMENT
[FreeTextEntry1] : Assessment/Plan:\par  60 year old female a hx of HTN, DM2, Thyroid nodule is referred to see me for abnormal brain MRI and to rule out possible Multiple Sclerosis. She was recently admitted to [facility] in 1/2021 and later 6/2021 for left 6th nerve palsy and right 6th nerve palsy, respectively, thought to be 2/2 poorly controlled DM and HTN. Her left 6th nerve palsy has resolved, however continues to have deficits from right 6th nerve palsy on exam and reports 50-60% improvement in symptoms. No significant vascular stenosis/occlusion on CTA H/N, orbits MRI w/w/o normal and brain MRI negative for acute stroke. I have personally reviewed MRI brain imaging from 1/2021 and 6/2021, which are stable, and show non enhancing, bilateral cerebral hemisphere white matter T2/FLAIR hyperintense lesions in subcortical regions and linearly projecting from lateral ventricles, no definite corpus callosum or infratentorial lesions seen. \par \par Neurological exam with evidence of right 6th nerve palsy, hyporeflexia in LE with length dependent neuropathy findings- likely 2/2 to DM. \par \par 1. Abnormal brain MRI - Her history is not entirely consistent with Multiple Sclerosis, and would be rare to have new onset MS symptoms at the age 59. Additionally, her white matter lesions could potentially be explained by microvascular ischemic changes. However, given the question for possible MS and given that her DM could possibly be masking any myelopathic exam findings, would recommend obtaining MRI imaging of spine to rule out demyelinating cord lesions and MS mimickers labs. If this work up is negative, would just recommend following clinically and repeating brain MRI w/w/o in 1 year. However, if demyelinating lesions are seen on spine MRI, will plan to pursue spinal tap to look for oligoclonal bands. \par 2. 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 A detailed chart review was completed prior to visit.\par \par The above plan was discussed with MANY JOSEPHRCIL in great detail.  MANY JOSEPHDORCIL verbalized understanding and agrees with plan as detailed above. Patient was provided education and counselling on current diagnosis/symptoms, diagnostic work up, treatment options and potential side effects of any prescribed therapy/therapies. She was advised to call our clinic at [phone] for any new or worsening symptoms, or with any questions or concerns. In case of acute onset of neurological symptoms or worsening presentation, patient was advised to present to nearest emergency room for further evaluation. MANY DAMIEN expressed understanding and all her questions/concerns were addressed.\par \skyler Guerrero M.D\par

## 2021-07-21 NOTE — REASON FOR VISIT
[Initial Evaluation] : an initial evaluation [[employer] ] : provided by [employer]   [Other: _____] : [unfilled] [FreeTextEntry1] : [id number] [FreeTextEntry3] : Marisol Calderon

## 2021-07-21 NOTE — HISTORY OF PRESENT ILLNESS
[FreeTextEntry1] : HPI (initial visit Jul 21, 2021)- Suzanna is a 61 yo female with a hx of HTN, DM2, Thyroid nodule is referred to see me for abnormal brain MRI and to rule out possible Multiple Sclerosis. \par \par Of note, she was admitted to [facility] in 1/2021 for left 6th nerve palsy and then again most recently 6/2021 for horizontal diplopia and diagnosed with right 6th nerve palsy, both thought to be 2/2 to ischemic cranial neuropathy in the setting or poorly controlled DM and HTN. Of note, HbA1c 13.5% in Jan 2021 and trended down to 8.3% in June 2021. \par \par She had imaging studies during both these admissions. CT head's were unremarkable. 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 MRI brain with no evidence of acute stroke and studies were stable and showed scattered white matter FLAIR/T2 hyperintense lesions in b/l cerebral hemispheres linearly projecting from lateral ventricles, no enhancing lesions. MRI orbits w/w/o unremarkable. \par \par She reports she is "getting better", more than 50%. She was seen by Dr. Alves in follow up 7/7/2021 and was recommended to wear eye patch. She will follow up in 6 weeks. \par \par She denies any hx of unilateral vision loss. No hx of lateralized body numbness/weakness. She does describes numbness and tingling in feet that has been attributed to her DM.\par \par \par No family hx of Multiple Sclerosis\par \par

## 2021-07-21 NOTE — PHYSICAL EXAM
[FreeTextEntry1] : PHYSICAL EXAM\par Constitutional: Alert, no acute distress \par Neck: Full range of motion\par Psychiatric: appropriate affect and mood\par Pulmonary: No respiratory distress, stable on room air\par \par NEUROLOGICAL EXAM\par Mental status: The patient is alert, attentive, and oriented.\par Speech/language: No dysarthria \par Cranial nerves:\par CN II: Pupil size equal and briskly reactive to light. \par CN III, IV, VI: Complete limitation in abduction OD. \par CN V: Facial sensation is intact to pinprick in all 3 divisions bilaterally.\par CN VII: Face is symmetric with normal eye closure and smile.\par CN VII: Hearing is normal to rubbing fingers\par CN IX, X: Palate elevates symmetrically. Phonation is normal.\par CN XI: Head turning and shoulder shrug are intact\par CN XII: Tongue is midline with normal movements and no atrophy.\par Motor: There is no pronator drift of out-stretched arms. Muscle bulk and tone are normal. Strength is full bilaterally. 5/5 muscle power at bilat: Deltoid, Biceps, Triceps, Wrist ext, Finger abd, Hip flex, Hip ext, Knee flex, Knee ext, Ankle flex, Ankle ext\par Reflexes: Reflexes are 2+ and symmetric at the biceps, +1 knees, and absent ankles. Plantar responses are flexor.\par Sensory: Intact sensations to all light touch and pinprick in upper and lower extremities. Absent vibratory sensation at toe b/l.\par Coordination: Rapid alternating movements and fine finger movements are intact. There is no dysmetria on finger-to-nose. There are no abnormal or extraneous movements. \par Gait/Stance: Posture is normal. Gait is steady with normal steps, base, arm swing, and turning. \par \par \par \par \par

## 2021-08-04 ENCOUNTER — APPOINTMENT (OUTPATIENT)
Dept: INTERNAL MEDICINE | Facility: CLINIC | Age: 61
End: 2021-08-04
Payer: MEDICAID

## 2021-08-04 ENCOUNTER — OUTPATIENT (OUTPATIENT)
Dept: OUTPATIENT SERVICES | Facility: HOSPITAL | Age: 61
LOS: 1 days | End: 2021-08-04

## 2021-08-04 VITALS
DIASTOLIC BLOOD PRESSURE: 90 MMHG | HEART RATE: 76 BPM | HEIGHT: 63 IN | SYSTOLIC BLOOD PRESSURE: 150 MMHG | OXYGEN SATURATION: 98 % | WEIGHT: 142 LBS | BODY MASS INDEX: 25.16 KG/M2

## 2021-08-04 DIAGNOSIS — Z00.00 ENCOUNTER FOR GENERAL ADULT MEDICAL EXAMINATION W/OUT ABNORMAL FINDINGS: ICD-10-CM

## 2021-08-04 DIAGNOSIS — Z83.49 FAMILY HISTORY OF OTHER ENDOCRINE, NUTRITIONAL AND METABOLIC DISEASES: ICD-10-CM

## 2021-08-04 DIAGNOSIS — Z83.3 FAMILY HISTORY OF DIABETES MELLITUS: ICD-10-CM

## 2021-08-04 PROCEDURE — 99386 PREV VISIT NEW AGE 40-64: CPT

## 2021-08-09 ENCOUNTER — NON-APPOINTMENT (OUTPATIENT)
Age: 61
End: 2021-08-09

## 2021-08-11 ENCOUNTER — APPOINTMENT (OUTPATIENT)
Dept: NEUROLOGY | Facility: CLINIC | Age: 61
End: 2021-08-11

## 2021-08-11 DIAGNOSIS — Z00.00 ENCOUNTER FOR GENERAL ADULT MEDICAL EXAMINATION WITHOUT ABNORMAL FINDINGS: ICD-10-CM

## 2021-08-11 DIAGNOSIS — I10 ESSENTIAL (PRIMARY) HYPERTENSION: ICD-10-CM

## 2021-08-11 DIAGNOSIS — E78.5 HYPERLIPIDEMIA, UNSPECIFIED: ICD-10-CM

## 2021-08-11 DIAGNOSIS — E11.65 TYPE 2 DIABETES MELLITUS WITH HYPERGLYCEMIA: ICD-10-CM

## 2021-08-11 PROBLEM — Z83.3 FAMILY HISTORY OF DIABETES MELLITUS: Status: ACTIVE | Noted: 2021-08-11

## 2021-08-11 PROBLEM — Z83.49 FAMILY HISTORY OF THYROID DISEASE: Status: ACTIVE | Noted: 2021-08-11

## 2021-08-11 LAB
ESTIMATED AVERAGE GLUCOSE: 195
HBA1C MFR BLD HPLC: 8.3

## 2021-08-11 NOTE — HISTORY OF PRESENT ILLNESS
[Family Member] : family member [Patient Declined  Services] : - None: Patient declined  services [FreeTextEntry1] : annual check up [TWNoteComboBox1] : Kvng [de-identified] : Here for annual visit and establish care.  Patient reports having diabetes, hypertension and hyperlipidemia and recently found to have an abnormal MRI for which she is seeing Neurology for evaluation.  She is otherwise doing well, although complaining of loose stools, almost diarrhea like.  Patient is accompanied by her daughter. \par

## 2021-08-11 NOTE — ASSESSMENT
[FreeTextEntry1] : 61 yo female with uncontrolled diabetes, hypertension and hyperlipidemia recently found to have an abnormal MRI with findings suggestive of MS undergoing evaluation with Neuro, presents to establish care with this provider.  \par \par Will order Mammogram, will need to review with patient immunizations and other screening at next office visit in a month.  Patient advised to obtain medical records from previous PMD.\par \par Assessment as indicated above in impressions with plans through orders below.

## 2021-08-11 NOTE — DATA REVIEWED
[FreeTextEntry1] : Obtained a1c from [facility], chart updated as done in June.

## 2021-08-11 NOTE — PHYSICAL EXAM
[No Acute Distress] : no acute distress [Well Developed] : well developed [Normal Voice/Communication] : normal voice/communication [Normal Sclera/Conjunctiva] : normal sclera/conjunctiva [Normal TMs] : both tympanic membranes were normal [No Lymphadenopathy] : no lymphadenopathy [No Respiratory Distress] : no respiratory distress  [Clear to Auscultation] : lungs were clear to auscultation bilaterally [Normal Rate] : normal rate  [Regular Rhythm] : with a regular rhythm [Normal S1, S2] : normal S1 and S2 [No Abdominal Bruit] : a ~M bruit was not heard ~T in the abdomen [No Edema] : there was no peripheral edema [Soft] : abdomen soft [Non Tender] : non-tender [Non-distended] : non-distended [No HSM] : no HSM [Normal Bowel Sounds] : normal bowel sounds [Normal Supraclavicular Nodes] : no supraclavicular lymphadenopathy [Normal Posterior Cervical Nodes] : no posterior cervical lymphadenopathy [Normal Anterior Cervical Nodes] : no anterior cervical lymphadenopathy

## 2021-08-13 ENCOUNTER — APPOINTMENT (OUTPATIENT)
Dept: NEUROLOGY | Facility: CLINIC | Age: 61
End: 2021-08-13
Payer: MEDICAID

## 2021-08-13 VITALS
HEART RATE: 80 BPM | WEIGHT: 142 LBS | HEIGHT: 63 IN | DIASTOLIC BLOOD PRESSURE: 86 MMHG | BODY MASS INDEX: 25.16 KG/M2 | SYSTOLIC BLOOD PRESSURE: 188 MMHG

## 2021-08-13 VITALS
DIASTOLIC BLOOD PRESSURE: 85 MMHG | WEIGHT: 142 LBS | BODY MASS INDEX: 25.16 KG/M2 | HEART RATE: 80 BPM | SYSTOLIC BLOOD PRESSURE: 190 MMHG | HEIGHT: 63 IN

## 2021-08-13 DIAGNOSIS — G47.30 SLEEP APNEA, UNSPECIFIED: ICD-10-CM

## 2021-08-13 PROCEDURE — 93888 INTRACRANIAL LIMITED STUDY: CPT

## 2021-08-13 PROCEDURE — 93880 EXTRACRANIAL BILAT STUDY: CPT

## 2021-08-13 PROCEDURE — 99215 OFFICE O/P EST HI 40 MIN: CPT

## 2021-08-13 NOTE — DISCUSSION/SUMMARY
[FreeTextEntry1] : 8/13/21.  Her neurologic exam shows a subtle right eye abduction deficit consistent with a mild right 6th nerve palsy; decreased vibratory sense at the toes, probably reflecting diabetic peripheral neuropathy.\par \par To summarize, on 1/19/2021 she presented with diplopia due to a left 6th nerve palsy.  Most likely this was an "ischemic" or "diabetic" 6th nerve palsy and there was no evidence of stroke.  She improved after about a month.  In late 6/21, she developed recurrent horizontal diplopia, apparently due to a right 6th nerve palsy, now also improving.  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  Given multiple hyperintensities on MRI, however, Dr. Guerrero is also investigating her for the less likely possibility of MS.  She will be following up with Dr. Alves for her 6th nerve palsy.\par \par From the neurovascular standpoint, there is no clearly defined role for aspirin for primary cardiovascular prevention.  I had initially suggested that she could stop the aspirin.  Given a moderate degree on MRI of what is likely to be white matter hyperintensities of presumed vascular origin, however, I would reverse this recommendation and suggest that she continue aspirin.\par \par She has an incidental, unruptured tiny ACOM aneurysm which should be monitored for stability, and I would suggest that she have repeat CTA head on a yearly basis (next CTA in about 2/22).\par \par Her blood pressure was markedly elevated in my office, and she will be following up with you for this issue.\par \par She has some degree of daytime fatigue, raising the possibility of sleep apnea, and I have requested a home sleep study.\par \par She can follow-up with me in 6-8 months.  I hope that she remains free of serious trouble.

## 2021-08-13 NOTE — CONSULT LETTER
[FreeTextEntry2] : Yesi Hudson MD [FreeTextEntry3] : Richard B. Libman, MD, FRCPC\par , Neurology \par Co-Director, Stroke Center\par Professor of Neurology\par [facility] [employer] [facility]\par

## 2021-08-13 NOTE — HISTORY OF PRESENT ILLNESS
[FreeTextEntry1] : 60-year-old right-handed lady \par \par Summary from NP Tamika Marc's note dated 2/3/2021-revised by me\par \par She presents today for post hospitalization follow up after a 6th nerve palsy on 1/19/21\par \par She has a history of HTN, DM2.  On 1/19/2021 she presented with  visual changes since this morning.  She noticed "blurry" vision in her "L eye".  She came to the ED for hyperglycemia. Patient is poorly compliant with her medications and has not been seeing doctors.\par CT head (1/19/2021) was unremarkable. CTA neck (1/19/2021) showed minimal plaque in the proximal right ICA. 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 \par MRI negative for acute infarct \par \par Today she remains with diplopia.  She is on ASA for unknown reasons as directed by PCP.\par \par 8/13/21.  She came to the office today, accompanied by her son and her young granddaughter.\par \par Her diplopia in 1/21 resolved after perhaps a month.  In late 6/21, she developed recurrent diplopia, although she was an ambiguous historian and it was difficult to elicit a detailed description of this.  She stated that once again the diplopia is improving.\par \par Repeat MRI brain (6/30/2021) to my eye showed moderate periventricular and subcortical hyperintense foci of presumed vascular origin, with demyelinating lesions being possible but less likely.  No acute infarction.\par \par Carotid Doppler (8/13/2021) showed mild heterogeneous plaque on the right with approximately 45% stenosis.  On the left there was mild heterogeneous plaque with less than 40% stenosis.  The extracranial vertebral arteries showed anterograde flow with normal resistance.\par \par Transcranial Doppler (8/13/2021) showed normal ophthalmic arteries, carotid siphons, vertebral arteries and basilar artery.  We were unable to insonate the remainder of the [place] due to poor temporal acoustic windows.\par \par

## 2021-08-13 NOTE — PHYSICAL EXAM
[FreeTextEntry1] : Generally looked well. Mental status exam: Alert, oriented x3, speech fluent/prosodic without paraphasias; comprehension intact; memory and fund of knowledge intact. On cranial nerve exam, I was unable to see the fundi; very mild right eye abduction deficit; ? subtle left facial palsy-perhaps physiological;  the remainder of cranial nerves II through XII was intact. On motor exam tone was normal. There was no drift. Fine finger movements are intact. Power was normal throughout. Reflexes were 1+-2+ in the arms, absent in the legs and plantar reflexes were downgoing. Coordination and gait were normal. Tandem gait was normal. Romberg test was negative.  On sensory exam there was decreased vibratory sense at the toes and other sensory modalities were intact.

## 2021-08-16 ENCOUNTER — NON-APPOINTMENT (OUTPATIENT)
Age: 61
End: 2021-08-16

## 2021-08-18 ENCOUNTER — APPOINTMENT (OUTPATIENT)
Dept: OPHTHALMOLOGY | Facility: CLINIC | Age: 61
End: 2021-08-18

## 2021-09-08 ENCOUNTER — RESULT REVIEW (OUTPATIENT)
Age: 61
End: 2021-09-08

## 2021-09-08 ENCOUNTER — OUTPATIENT (OUTPATIENT)
Dept: OUTPATIENT SERVICES | Facility: HOSPITAL | Age: 61
LOS: 1 days | End: 2021-09-08

## 2021-09-08 ENCOUNTER — APPOINTMENT (OUTPATIENT)
Dept: INTERNAL MEDICINE | Facility: CLINIC | Age: 61
End: 2021-09-08
Payer: MEDICAID

## 2021-09-08 VITALS
OXYGEN SATURATION: 98 % | HEART RATE: 77 BPM | DIASTOLIC BLOOD PRESSURE: 80 MMHG | SYSTOLIC BLOOD PRESSURE: 168 MMHG | HEIGHT: 63 IN | BODY MASS INDEX: 24.8 KG/M2 | WEIGHT: 140 LBS

## 2021-09-08 VITALS — DIASTOLIC BLOOD PRESSURE: 78 MMHG | SYSTOLIC BLOOD PRESSURE: 180 MMHG

## 2021-09-08 VITALS — TEMPERATURE: 98.1 F

## 2021-09-08 LAB
A1C WITH ESTIMATED AVERAGE GLUCOSE RESULT: 8 % — HIGH (ref 4–5.6)
ANION GAP SERPL CALC-SCNC: 12 MMOL/L — SIGNIFICANT CHANGE UP (ref 7–14)
BUN SERPL-MCNC: 10 MG/DL — SIGNIFICANT CHANGE UP (ref 7–23)
CALCIUM SERPL-MCNC: 8.8 MG/DL — SIGNIFICANT CHANGE UP (ref 8.4–10.5)
CHLORIDE SERPL-SCNC: 103 MMOL/L — SIGNIFICANT CHANGE UP (ref 98–107)
CO2 SERPL-SCNC: 29 MMOL/L — SIGNIFICANT CHANGE UP (ref 22–31)
CREAT SERPL-MCNC: 1.03 MG/DL — SIGNIFICANT CHANGE UP (ref 0.5–1.3)
ESTIMATED AVERAGE GLUCOSE: 183 — SIGNIFICANT CHANGE UP
GLUCOSE SERPL-MCNC: 192 MG/DL — HIGH (ref 70–99)
POTASSIUM SERPL-MCNC: 3.5 MMOL/L — SIGNIFICANT CHANGE UP (ref 3.5–5.3)
POTASSIUM SERPL-SCNC: 3.5 MMOL/L — SIGNIFICANT CHANGE UP (ref 3.5–5.3)
SODIUM SERPL-SCNC: 144 MMOL/L — SIGNIFICANT CHANGE UP (ref 135–145)

## 2021-09-08 PROCEDURE — 99213 OFFICE O/P EST LOW 20 MIN: CPT

## 2021-09-08 RX ORDER — METOPROLOL SUCCINATE 100 MG/1
100 TABLET, EXTENDED RELEASE ORAL DAILY
Qty: 90 | Refills: 1 | Status: DISCONTINUED | COMMUNITY
Start: 2021-02-02 | End: 2021-09-08

## 2021-09-14 NOTE — PHYSICAL EXAM
[No Acute Distress] : no acute distress [Well-Appearing] : well-appearing [Normal Voice/Communication] : normal voice/communication [No Respiratory Distress] : no respiratory distress  [Clear to Auscultation] : lungs were clear to auscultation bilaterally [Normal] : normal rate, regular rhythm, normal S1 and S2 and no murmur heard [No Edema] : there was no peripheral edema

## 2021-09-14 NOTE — REVIEW OF SYSTEMS
[Chest Pain] : no chest pain [Palpitations] : no palpitations [Lower Ext Edema] : no lower extremity edema [Dyspnea on Exertion] : no dyspnea on exertion [Shortness Of Breath] : no shortness of breath [Negative] : Constitutional

## 2021-09-14 NOTE — HISTORY OF PRESENT ILLNESS
[FreeTextEntry1] : follow up chronic diseases [de-identified] : Patient presents following up on her chronic diseases without many concerns.  She is checking her fingersticks for diabetes, notices her fasting to be around 100s. Denies any sugars above 200. \par \par She doesn't check on her blood pressures at home, but is taking medications as prescribed.

## 2021-09-20 DIAGNOSIS — I10 ESSENTIAL (PRIMARY) HYPERTENSION: ICD-10-CM

## 2021-09-20 DIAGNOSIS — E11.65 TYPE 2 DIABETES MELLITUS WITH HYPERGLYCEMIA: ICD-10-CM

## 2021-10-09 ENCOUNTER — APPOINTMENT (OUTPATIENT)
Dept: MRI IMAGING | Facility: IMAGING CENTER | Age: 61
End: 2021-10-09
Payer: MEDICAID

## 2021-10-09 ENCOUNTER — OUTPATIENT (OUTPATIENT)
Dept: OUTPATIENT SERVICES | Facility: HOSPITAL | Age: 61
LOS: 1 days | End: 2021-10-09
Payer: MEDICAID

## 2021-10-09 DIAGNOSIS — C18.9 MALIGNANT NEOPLASM OF COLON, UNSPECIFIED: ICD-10-CM

## 2021-10-09 PROCEDURE — 72156 MRI NECK SPINE W/O & W/DYE: CPT | Mod: 26

## 2021-10-09 PROCEDURE — 72156 MRI NECK SPINE W/O & W/DYE: CPT

## 2021-10-09 PROCEDURE — 72157 MRI CHEST SPINE W/O & W/DYE: CPT | Mod: 26

## 2021-10-09 PROCEDURE — 72157 MRI CHEST SPINE W/O & W/DYE: CPT

## 2021-10-13 ENCOUNTER — APPOINTMENT (OUTPATIENT)
Dept: INTERNAL MEDICINE | Facility: CLINIC | Age: 61
End: 2021-10-13

## 2021-10-13 NOTE — SWALLOW BEDSIDE ASSESSMENT ADULT - SWALLOW EVAL: RECOMMENDED FEEDING/EATING TECHNIQUES
Controlled volume / rate of intake; No straws/maintain upright posture during/after eating for 30 mins/position upright (90 degrees) Finger Fracture in Children    WHAT YOU NEED TO KNOW:    A finger fracture is a break in one or more of the bones in your child's finger.    DISCHARGE INSTRUCTIONS:    Seek care immediately if:   •Your child's cast or splint gets wet, damaged, or comes off.      •Your child says his or her splint or cast feels too tight.      •Your child has severe pain in his or her finger.      •Your child's finger is cold, numb, or pale.      Call your child's doctor or hand specialist if:   •Your child's pain or swelling gets worse, even after treatment.      •You have questions or concerns about your child's condition or care.      Medicines: Your child may need any of the following:   •NSAIDs, such as ibuprofen, help decrease swelling, pain, and fever. This medicine is available with or without a doctor's order. NSAIDs can cause stomach bleeding or kidney problems in certain people. If your child takes blood thinner medicine, always ask if NSAIDs are safe for him or her. Always read the medicine label and follow directions. Do not give these medicines to children under 6 months of age without direction from your child's healthcare provider.      •Acetaminophen decreases pain and fever. It is available without a doctor's order. Ask how much to give your child and how often to give it. Follow directions. Read the labels of all other medicines your child uses to see if they also contain acetaminophen, or ask your child's doctor or pharmacist. Acetaminophen can cause liver damage if not taken correctly.      •Do not give aspirin to children under 18 years of age. Your child could develop Reye syndrome if he takes aspirin. Reye syndrome can cause life-threatening brain and liver damage. Check your child's medicine labels for aspirin, salicylates, or oil of wintergreen.       •Give your child's medicine as directed. Contact your child's healthcare provider if you think the medicine is not working as expected. Tell him or her if your child is allergic to any medicine. Keep a current list of the medicines, vitamins, and herbs your child takes. Include the amounts, and when, how, and why they are taken. Bring the list or the medicines in their containers to follow-up visits. Carry your child's medicine list with you in case of an emergency.      Help manage your child's symptoms:   •Have your child wear his or her splint as directed. Do not remove the splint until you follow up with your child's healthcare provider or hand specialist.      •Apply ice on your child's finger for 15 to 20 minutes every hour or as directed. Use an ice pack, or put crushed ice in a plastic bag. Cover it with a towel before you apply it to your child's skin. Ice helps prevent tissue damage and decreases swelling and pain.      •Elevate your child's finger above the level of his or her heart as often as you can. This will help decrease swelling and pain. Prop your child's hand on pillows or blankets to keep it elevated comfortably.  Elevate Arm           Follow up with your child's doctor or hand specialist within 2 days: Write down your questions so you remember to ask them during your child's visits.

## 2021-11-03 ENCOUNTER — APPOINTMENT (OUTPATIENT)
Dept: NEUROLOGY | Facility: CLINIC | Age: 61
End: 2021-11-03
Payer: MEDICAID

## 2021-11-03 VITALS
DIASTOLIC BLOOD PRESSURE: 73 MMHG | BODY MASS INDEX: 25.16 KG/M2 | WEIGHT: 142 LBS | SYSTOLIC BLOOD PRESSURE: 140 MMHG | HEIGHT: 63 IN | HEART RATE: 80 BPM

## 2021-11-03 DIAGNOSIS — H49.22 SIXTH [ABDUCENT] NERVE PALSY, LEFT EYE: ICD-10-CM

## 2021-11-03 PROCEDURE — 99214 OFFICE O/P EST MOD 30 MIN: CPT

## 2021-11-09 PROBLEM — H49.22 LEFT ABDUCENS NERVE PALSY: Status: ACTIVE | Noted: 2021-02-03

## 2021-11-09 NOTE — HISTORY OF PRESENT ILLNESS
[FreeTextEntry1] : 62 y/o woman hx of bilateral 6th nerve, presumed ischemic from diabetes, had MRIs in the past that showed some white matter lesions, no lesions the spinal cord, MS essentially ruled out. As instructed by Dr. Libman has been taking aspirin daily due to the evidence of ischemic changes on MRI brain. Blood sugars have been better controlled has been more in the low 100s-140s, occasionally up to 180. Has been taking Lantus along with dietary changes. /73 on this visit. No dizziness or lightheadedness on her BP medications.

## 2021-11-09 NOTE — ASSESSMENT
[FreeTextEntry1] : 60 y/o woman with hx of DMII, HTN, bilateral ischemic 6th nerve palsies which have returned to normal. She denies any new symptoms other than burning in her feet. On exam she has decreased vibration, proprioception at the toes up to below the knees, likely diabetic neuropathy. \par \par - Started gabapentin 100mg at night\par - Pt to follow up with Dr. Libman in 4 months.

## 2021-11-09 NOTE — PHYSICAL EXAM
[General Appearance - Alert] : alert [General Appearance - In No Acute Distress] : in no acute distress [Oriented To Time, Place, And Person] : oriented to person, place, and time [Impaired Insight] : insight and judgment were intact [Affect] : the affect was normal [Person] : oriented to person [Place] : oriented to place [Time] : oriented to time [Concentration Intact] : normal concentrating ability [Visual Intact] : visual attention was ~T not ~L decreased [Naming Objects] : no difficulty naming common objects [Repeating Phrases] : no difficulty repeating a phrase [Writing A Sentence] : no difficulty writing a sentence [Fluency] : fluency intact [Comprehension] : comprehension intact [Reading] : reading intact [Past History] : adequate knowledge of personal past history [Cranial Nerves Optic (II)] : visual acuity intact bilaterally,  visual fields full to confrontation, pupils equal round and reactive to light [Cranial Nerves Oculomotor (III)] : extraocular motion intact [Cranial Nerves Trigeminal (V)] : facial sensation intact symmetrically [Cranial Nerves Facial (VII)] : face symmetrical [Cranial Nerves Vestibulocochlear (VIII)] : hearing was intact bilaterally [Cranial Nerves Glossopharyngeal (IX)] : tongue and palate midline [Cranial Nerves Accessory (XI - Cranial And Spinal)] : head turning and shoulder shrug symmetric [Cranial Nerves Hypoglossal (XII)] : there was no tongue deviation with protrusion [Motor Tone] : muscle tone was normal in all four extremities [Motor Strength] : muscle strength was normal in all four extremities [No Muscle Atrophy] : normal bulk in all four extremities [Sensation Tactile Decrease] : light touch was intact [Tactile Decrease Lower Medial Thigh And Knee - Right Only] : diminished right lower medial thigh and knee [Tactile Decrease Distal Extremities (Glove And Stocking)] : diminished stocking/glove distribution [Pain / Temp Decrease Lower Medial Thigh & Knee Right Only] : diminished right lower medial thigh and knee [Pain / Temp Decrease Distal Extremities (Glove & Stocking)] : diminished stocking/glove distribution [Glove / Stocking] : decreased in a glove and stocking distribution [Vibration Decrease Leg / Foot Both Ankles] : decreased at both ankles [Vibration Decrease Leg / Foot Toes Both Feet] : decreased at the toes of both feet  [Position Sensation Decrease Leg/Foot At Level Of Toes] : impaired at the toes in both legs [Abnormal Walk] : normal gait [Balance] : balance was intact [2+] : Ankle jerk left 2+ [Past-pointing] : there was no past-pointing [Tremor] : no tremor present [Plantar Reflex Right Only] : normal on the right [Plantar Reflex Left Only] : normal on the left

## 2022-02-01 ENCOUNTER — APPOINTMENT (OUTPATIENT)
Dept: ENDOCRINOLOGY | Facility: CLINIC | Age: 62
End: 2022-02-01

## 2022-02-02 ENCOUNTER — RX RENEWAL (OUTPATIENT)
Age: 62
End: 2022-02-02

## 2022-03-02 ENCOUNTER — OUTPATIENT (OUTPATIENT)
Dept: OUTPATIENT SERVICES | Facility: HOSPITAL | Age: 62
LOS: 1 days | End: 2022-03-02

## 2022-03-02 ENCOUNTER — RESULT REVIEW (OUTPATIENT)
Age: 62
End: 2022-03-02

## 2022-03-02 ENCOUNTER — APPOINTMENT (OUTPATIENT)
Dept: INTERNAL MEDICINE | Facility: CLINIC | Age: 62
End: 2022-03-02
Payer: MEDICAID

## 2022-03-02 ENCOUNTER — RESULT CHARGE (OUTPATIENT)
Age: 62
End: 2022-03-02

## 2022-03-02 ENCOUNTER — NON-APPOINTMENT (OUTPATIENT)
Age: 62
End: 2022-03-02

## 2022-03-02 VITALS
WEIGHT: 143 LBS | BODY MASS INDEX: 25.34 KG/M2 | DIASTOLIC BLOOD PRESSURE: 100 MMHG | HEART RATE: 85 BPM | HEIGHT: 63 IN | SYSTOLIC BLOOD PRESSURE: 170 MMHG | OXYGEN SATURATION: 97 %

## 2022-03-02 VITALS — TEMPERATURE: 98 F

## 2022-03-02 DIAGNOSIS — E11.43 TYPE 2 DIABETES MELLITUS WITH DIABETIC AUTONOMIC (POLY)NEUROPATHY: ICD-10-CM

## 2022-03-02 DIAGNOSIS — Q21.1 ATRIAL SEPTAL DEFECT: ICD-10-CM

## 2022-03-02 DIAGNOSIS — R90.89 OTHER ABNORMAL FINDINGS ON DIAGNOSTIC IMAGING OF CENTRAL NERVOUS SYSTEM: ICD-10-CM

## 2022-03-02 DIAGNOSIS — E11.42 TYPE 2 DIABETES MELLITUS WITH DIABETIC POLYNEUROPATHY: ICD-10-CM

## 2022-03-02 DIAGNOSIS — I67.1 CEREBRAL ANEURYSM, NONRUPTURED: ICD-10-CM

## 2022-03-02 LAB
ALBUMIN SERPL ELPH-MCNC: 4.5 G/DL — SIGNIFICANT CHANGE UP (ref 3.3–5)
ALP SERPL-CCNC: 213 U/L — HIGH (ref 40–120)
ALT FLD-CCNC: 11 U/L — SIGNIFICANT CHANGE UP (ref 4–33)
ANION GAP SERPL CALC-SCNC: 12 MMOL/L — SIGNIFICANT CHANGE UP (ref 7–14)
AST SERPL-CCNC: 16 U/L — SIGNIFICANT CHANGE UP (ref 4–32)
BASOPHILS # BLD AUTO: 0.02 K/UL — SIGNIFICANT CHANGE UP (ref 0–0.2)
BASOPHILS NFR BLD AUTO: 0.2 % — SIGNIFICANT CHANGE UP (ref 0–2)
BILIRUB SERPL-MCNC: 0.3 MG/DL — SIGNIFICANT CHANGE UP (ref 0.2–1.2)
BUN SERPL-MCNC: 12 MG/DL — SIGNIFICANT CHANGE UP (ref 7–23)
CALCIUM SERPL-MCNC: 9.6 MG/DL — SIGNIFICANT CHANGE UP (ref 8.4–10.5)
CHLORIDE SERPL-SCNC: 103 MMOL/L — SIGNIFICANT CHANGE UP (ref 98–107)
CHOLEST SERPL-MCNC: 149 MG/DL — SIGNIFICANT CHANGE UP
CO2 SERPL-SCNC: 30 MMOL/L — SIGNIFICANT CHANGE UP (ref 22–31)
CREAT SERPL-MCNC: 1.02 MG/DL — SIGNIFICANT CHANGE UP (ref 0.5–1.3)
EGFR: 62 ML/MIN/1.73M2 — SIGNIFICANT CHANGE UP
EOSINOPHIL # BLD AUTO: 0.11 K/UL — SIGNIFICANT CHANGE UP (ref 0–0.5)
EOSINOPHIL NFR BLD AUTO: 0.8 % — SIGNIFICANT CHANGE UP (ref 0–6)
GLUCOSE SERPL-MCNC: 138 MG/DL — HIGH (ref 70–99)
HBA1C MFR BLD HPLC: 8.2
HCT VFR BLD CALC: 43.9 % — SIGNIFICANT CHANGE UP (ref 34.5–45)
HDLC SERPL-MCNC: 34 MG/DL — LOW
HGB BLD-MCNC: 13.3 G/DL — SIGNIFICANT CHANGE UP (ref 11.5–15.5)
IANC: 8.58 K/UL — HIGH (ref 1.5–8.5)
IMM GRANULOCYTES NFR BLD AUTO: 0.4 % — SIGNIFICANT CHANGE UP (ref 0–1.5)
LIPID PNL WITH DIRECT LDL SERPL: 93 MG/DL — SIGNIFICANT CHANGE UP
LYMPHOCYTES # BLD AUTO: 27.8 % — SIGNIFICANT CHANGE UP (ref 13–44)
LYMPHOCYTES # BLD AUTO: 3.66 K/UL — HIGH (ref 1–3.3)
MCHC RBC-ENTMCNC: 26.5 PG — LOW (ref 27–34)
MCHC RBC-ENTMCNC: 30.3 GM/DL — LOW (ref 32–36)
MCV RBC AUTO: 87.5 FL — SIGNIFICANT CHANGE UP (ref 80–100)
MONOCYTES # BLD AUTO: 0.75 K/UL — SIGNIFICANT CHANGE UP (ref 0–0.9)
MONOCYTES NFR BLD AUTO: 5.7 % — SIGNIFICANT CHANGE UP (ref 2–14)
NEUTROPHILS # BLD AUTO: 8.58 K/UL — HIGH (ref 1.8–7.4)
NEUTROPHILS NFR BLD AUTO: 65.1 % — SIGNIFICANT CHANGE UP (ref 43–77)
NON HDL CHOLESTEROL: 115 MG/DL — SIGNIFICANT CHANGE UP
NRBC # BLD: 0 /100 WBCS — SIGNIFICANT CHANGE UP
NRBC # FLD: 0 K/UL — SIGNIFICANT CHANGE UP
PLATELET # BLD AUTO: 363 K/UL — SIGNIFICANT CHANGE UP (ref 150–400)
POTASSIUM SERPL-MCNC: 5 MMOL/L — SIGNIFICANT CHANGE UP (ref 3.5–5.3)
POTASSIUM SERPL-SCNC: 5 MMOL/L — SIGNIFICANT CHANGE UP (ref 3.5–5.3)
PROT SERPL-MCNC: 8 G/DL — SIGNIFICANT CHANGE UP (ref 6–8.3)
RBC # BLD: 5.02 M/UL — SIGNIFICANT CHANGE UP (ref 3.8–5.2)
RBC # FLD: 13.7 % — SIGNIFICANT CHANGE UP (ref 10.3–14.5)
SODIUM SERPL-SCNC: 145 MMOL/L — SIGNIFICANT CHANGE UP (ref 135–145)
T4 FREE SERPL-MCNC: 1.5 NG/DL — SIGNIFICANT CHANGE UP (ref 0.9–1.8)
TRIGL SERPL-MCNC: 108 MG/DL — SIGNIFICANT CHANGE UP
TSH SERPL-MCNC: <0.1 UIU/ML — LOW (ref 0.27–4.2)
WBC # BLD: 13.17 K/UL — HIGH (ref 3.8–10.5)
WBC # FLD AUTO: 13.17 K/UL — HIGH (ref 3.8–10.5)

## 2022-03-02 PROCEDURE — 99214 OFFICE O/P EST MOD 30 MIN: CPT

## 2022-03-03 ENCOUNTER — TRANSCRIPTION ENCOUNTER (OUTPATIENT)
Age: 62
End: 2022-03-03

## 2022-03-03 LAB
ALBUMIN, RANDOM URINE: 15.5 MG/DL — SIGNIFICANT CHANGE UP
ALBUMIN/CREATININE RATIO (ACR): 313 MG/G — HIGH (ref 0–30)
CREAT ?TM UR-MCNC: 49 MG/DL — SIGNIFICANT CHANGE UP
HBV CORE AB SER-ACNC: SIGNIFICANT CHANGE UP
HBV SURFACE AB SER-ACNC: SIGNIFICANT CHANGE UP
HCV AB S/CO SERPL IA: 0.22 S/CO — SIGNIFICANT CHANGE UP (ref 0–0.99)
HCV AB SERPL-IMP: SIGNIFICANT CHANGE UP

## 2022-03-07 DIAGNOSIS — R79.89 OTHER SPECIFIED ABNORMAL FINDINGS OF BLOOD CHEMISTRY: ICD-10-CM

## 2022-03-07 DIAGNOSIS — E11.65 TYPE 2 DIABETES MELLITUS WITH HYPERGLYCEMIA: ICD-10-CM

## 2022-03-07 DIAGNOSIS — Q21.1 ATRIAL SEPTAL DEFECT: ICD-10-CM

## 2022-03-07 DIAGNOSIS — I10 ESSENTIAL (PRIMARY) HYPERTENSION: ICD-10-CM

## 2022-03-07 DIAGNOSIS — E11.42 TYPE 2 DIABETES MELLITUS WITH DIABETIC POLYNEUROPATHY: ICD-10-CM

## 2022-03-07 DIAGNOSIS — I67.1 CEREBRAL ANEURYSM, NONRUPTURED: ICD-10-CM

## 2022-03-07 DIAGNOSIS — E78.5 HYPERLIPIDEMIA, UNSPECIFIED: ICD-10-CM

## 2022-03-07 PROBLEM — E11.43 DIABETIC AUTONOMIC NEUROPATHY ASSOCIATED WITH TYPE 2 DIABETES MELLITUS: Status: ACTIVE | Noted: 2021-11-03

## 2022-03-07 PROBLEM — R90.89 ABNORMAL BRAIN MRI: Noted: 2021-07-21

## 2022-03-07 NOTE — PHYSICAL EXAM
[No Respiratory Distress] : no respiratory distress  [Clear to Auscultation] : lungs were clear to auscultation bilaterally [Normal] : normal rate, regular rhythm, normal S1 and S2 and no murmur heard [No Edema] : there was no peripheral edema

## 2022-03-07 NOTE — HISTORY OF PRESENT ILLNESS
[Patient was last seen on ___] : Patient was last seen on [unfilled] [Diabetes Mellitus] : Diabetes Mellitus [Hyperlipidemia] : Hyperlipidemia [Hypertension] : Hypertension [Family Member] : family member [Patient Declined  Services] : - None: Patient declined  services [FreeTextEntry6] : Patient presents for follow up of her chronic diseases stating that she has run out of medications.  She was taking her medications as prescribed until a few days ago.  She also has concerns regarding continued cramping intermittently, where she feels stiff. no pain in her legs, no more burning in her feet.  Doesn't notice swelling of her legs. [TWNoteComboBox1] : Kvng [No episodes] : No hypoglycemic episodes since the last visit. [Check glucose ___ x/day] : Patient checks  blood glucose [unfilled]  times a day [de-identified] : fasting::100s for the most part, but if doesn't take her insulin at night then her fasting can be 200\par 2 hours after dinner: almost 180s to 200 \par  [Does not check BP] : The patient is not checking blood pressure [120/80] : Target blood pressure is 120/80 [Target goal not met] : BP target goal not met [de-identified] : blood pressure readings: 140/84 to 150 [Managed with medications] : managed with  medication [High Intensity therapy] : Patient is currently on high intensity statin therapy

## 2022-04-20 ENCOUNTER — RESULT REVIEW (OUTPATIENT)
Age: 62
End: 2022-04-20

## 2022-04-20 ENCOUNTER — APPOINTMENT (OUTPATIENT)
Dept: INTERNAL MEDICINE | Facility: CLINIC | Age: 62
End: 2022-04-20
Payer: MEDICAID

## 2022-04-20 ENCOUNTER — OUTPATIENT (OUTPATIENT)
Dept: OUTPATIENT SERVICES | Facility: HOSPITAL | Age: 62
LOS: 1 days | End: 2022-04-20

## 2022-04-20 VITALS
SYSTOLIC BLOOD PRESSURE: 140 MMHG | HEART RATE: 89 BPM | HEIGHT: 63 IN | DIASTOLIC BLOOD PRESSURE: 76 MMHG | OXYGEN SATURATION: 99 % | TEMPERATURE: 97.3 F | BODY MASS INDEX: 25.71 KG/M2 | WEIGHT: 145.13 LBS | RESPIRATION RATE: 18 BRPM

## 2022-04-20 DIAGNOSIS — Z12.39 ENCOUNTER FOR OTHER SCREENING FOR MALIGNANT NEOPLASM OF BREAST: ICD-10-CM

## 2022-04-20 DIAGNOSIS — E04.1 NONTOXIC SINGLE THYROID NODULE: ICD-10-CM

## 2022-04-20 DIAGNOSIS — R74.8 ABNORMAL LEVELS OF OTHER SERUM ENZYMES: ICD-10-CM

## 2022-04-20 DIAGNOSIS — R79.89 OTHER SPECIFIED ABNORMAL FINDINGS OF BLOOD CHEMISTRY: ICD-10-CM

## 2022-04-20 LAB
ALBUMIN SERPL ELPH-MCNC: 4.3 G/DL — SIGNIFICANT CHANGE UP (ref 3.3–5)
ALP SERPL-CCNC: 214 U/L — HIGH (ref 40–120)
ALT FLD-CCNC: 15 U/L — SIGNIFICANT CHANGE UP (ref 4–33)
ANION GAP SERPL CALC-SCNC: 9 MMOL/L — SIGNIFICANT CHANGE UP (ref 7–14)
AST SERPL-CCNC: 19 U/L — SIGNIFICANT CHANGE UP (ref 4–32)
BASOPHILS # BLD AUTO: 0.03 K/UL — SIGNIFICANT CHANGE UP (ref 0–0.2)
BASOPHILS NFR BLD AUTO: 0.2 % — SIGNIFICANT CHANGE UP (ref 0–2)
BILIRUB SERPL-MCNC: <0.2 MG/DL — SIGNIFICANT CHANGE UP (ref 0.2–1.2)
BUN SERPL-MCNC: 17 MG/DL — SIGNIFICANT CHANGE UP (ref 7–23)
CALCIUM SERPL-MCNC: 9.5 MG/DL — SIGNIFICANT CHANGE UP (ref 8.4–10.5)
CHLORIDE SERPL-SCNC: 107 MMOL/L — SIGNIFICANT CHANGE UP (ref 98–107)
CO2 SERPL-SCNC: 24 MMOL/L — SIGNIFICANT CHANGE UP (ref 22–31)
CREAT SERPL-MCNC: 0.95 MG/DL — SIGNIFICANT CHANGE UP (ref 0.5–1.3)
EGFR: 68 ML/MIN/1.73M2 — SIGNIFICANT CHANGE UP
EOSINOPHIL # BLD AUTO: 0.14 K/UL — SIGNIFICANT CHANGE UP (ref 0–0.5)
EOSINOPHIL NFR BLD AUTO: 0.9 % — SIGNIFICANT CHANGE UP (ref 0–6)
GGT SERPL-CCNC: 19 U/L — SIGNIFICANT CHANGE UP (ref 5–36)
GLUCOSE SERPL-MCNC: 154 MG/DL — HIGH (ref 70–99)
HCT VFR BLD CALC: 40.8 % — SIGNIFICANT CHANGE UP (ref 34.5–45)
HGB BLD-MCNC: 12.7 G/DL — SIGNIFICANT CHANGE UP (ref 11.5–15.5)
IANC: 11.35 K/UL — HIGH (ref 1.8–7.4)
IMM GRANULOCYTES NFR BLD AUTO: 0.3 % — SIGNIFICANT CHANGE UP (ref 0–1.5)
LYMPHOCYTES # BLD AUTO: 20.4 % — SIGNIFICANT CHANGE UP (ref 13–44)
LYMPHOCYTES # BLD AUTO: 3.15 K/UL — SIGNIFICANT CHANGE UP (ref 1–3.3)
MCHC RBC-ENTMCNC: 26.9 PG — LOW (ref 27–34)
MCHC RBC-ENTMCNC: 31.1 GM/DL — LOW (ref 32–36)
MCV RBC AUTO: 86.4 FL — SIGNIFICANT CHANGE UP (ref 80–100)
MONOCYTES # BLD AUTO: 0.76 K/UL — SIGNIFICANT CHANGE UP (ref 0–0.9)
MONOCYTES NFR BLD AUTO: 4.9 % — SIGNIFICANT CHANGE UP (ref 2–14)
NEUTROPHILS # BLD AUTO: 11.35 K/UL — HIGH (ref 1.8–7.4)
NEUTROPHILS NFR BLD AUTO: 73.3 % — SIGNIFICANT CHANGE UP (ref 43–77)
NRBC # BLD: 0 /100 WBCS — SIGNIFICANT CHANGE UP
NRBC # FLD: 0 K/UL — SIGNIFICANT CHANGE UP
PLATELET # BLD AUTO: 374 K/UL — SIGNIFICANT CHANGE UP (ref 150–400)
POTASSIUM SERPL-MCNC: 5.8 MMOL/L — HIGH (ref 3.5–5.3)
POTASSIUM SERPL-SCNC: 5.8 MMOL/L — HIGH (ref 3.5–5.3)
PROT SERPL-MCNC: 7.9 G/DL — SIGNIFICANT CHANGE UP (ref 6–8.3)
RBC # BLD: 4.72 M/UL — SIGNIFICANT CHANGE UP (ref 3.8–5.2)
RBC # FLD: 13.9 % — SIGNIFICANT CHANGE UP (ref 10.3–14.5)
SODIUM SERPL-SCNC: 140 MMOL/L — SIGNIFICANT CHANGE UP (ref 135–145)
T4 FREE SERPL-MCNC: 1.4 NG/DL — SIGNIFICANT CHANGE UP (ref 0.9–1.8)
TSH SERPL-MCNC: <0.1 UIU/ML — LOW (ref 0.27–4.2)
WBC # BLD: 15.47 K/UL — HIGH (ref 3.8–10.5)
WBC # FLD AUTO: 15.47 K/UL — HIGH (ref 3.8–10.5)

## 2022-04-20 PROCEDURE — 99214 OFFICE O/P EST MOD 30 MIN: CPT

## 2022-04-22 DIAGNOSIS — E11.65 TYPE 2 DIABETES MELLITUS WITH HYPERGLYCEMIA: ICD-10-CM

## 2022-04-22 DIAGNOSIS — I10 ESSENTIAL (PRIMARY) HYPERTENSION: ICD-10-CM

## 2022-04-22 DIAGNOSIS — R79.89 OTHER SPECIFIED ABNORMAL FINDINGS OF BLOOD CHEMISTRY: ICD-10-CM

## 2022-04-22 DIAGNOSIS — R74.8 ABNORMAL LEVELS OF OTHER SERUM ENZYMES: ICD-10-CM

## 2022-04-22 DIAGNOSIS — E04.1 NONTOXIC SINGLE THYROID NODULE: ICD-10-CM

## 2022-04-22 DIAGNOSIS — D72.829 ELEVATED WHITE BLOOD CELL COUNT, UNSPECIFIED: ICD-10-CM

## 2022-04-22 NOTE — HISTORY OF PRESENT ILLNESS
[Family Member] : family member [Patient Declined  Services] : - None: Patient declined  services [FreeTextEntry1] : follow up diabetes and hypertension [de-identified] : Patient presents for follow up without many concerns.  She is checking her fingersticks and is noticing elevated fasting in the 200s, her random fingersticks tend to run in the 300s.\par \par She also checks her Blood pressure: 130/80- 140/90 and is taking her medications daily.\par \par She is otherwise without any other concerns.  none

## 2022-04-22 NOTE — PHYSICAL EXAM
[No Acute Distress] : no acute distress [Well Developed] : well developed [Well-Appearing] : well-appearing [Normal Voice/Communication] : normal voice/communication [Normal] : normal rate, regular rhythm, normal S1 and S2 and no murmur heard [No Edema] : there was no peripheral edema

## 2022-04-22 NOTE — REVIEW OF SYSTEMS
[Negative] : Psychiatric [de-identified] : afraid of needles and does not want vaccines as a result.

## 2022-05-02 ENCOUNTER — APPOINTMENT (OUTPATIENT)
Dept: NEUROLOGY | Facility: CLINIC | Age: 62
End: 2022-05-02
Payer: MEDICAID

## 2022-05-02 PROCEDURE — 93888 INTRACRANIAL LIMITED STUDY: CPT

## 2022-05-02 PROCEDURE — 93880 EXTRACRANIAL BILAT STUDY: CPT

## 2022-06-08 ENCOUNTER — TRANSCRIPTION ENCOUNTER (OUTPATIENT)
Age: 62
End: 2022-06-08

## 2022-06-08 ENCOUNTER — NON-APPOINTMENT (OUTPATIENT)
Age: 62
End: 2022-06-08

## 2022-06-08 ENCOUNTER — APPOINTMENT (OUTPATIENT)
Dept: NEUROLOGY | Facility: CLINIC | Age: 62
End: 2022-06-08
Payer: MEDICAID

## 2022-06-08 VITALS
WEIGHT: 147 LBS | BODY MASS INDEX: 26.05 KG/M2 | SYSTOLIC BLOOD PRESSURE: 140 MMHG | HEART RATE: 98 BPM | DIASTOLIC BLOOD PRESSURE: 83 MMHG | HEIGHT: 63 IN

## 2022-06-08 VITALS
BODY MASS INDEX: 26.05 KG/M2 | DIASTOLIC BLOOD PRESSURE: 83 MMHG | WEIGHT: 147 LBS | HEIGHT: 63 IN | SYSTOLIC BLOOD PRESSURE: 140 MMHG

## 2022-06-08 DIAGNOSIS — H49.21 SIXTH [ABDUCENT] NERVE PALSY, RIGHT EYE: ICD-10-CM

## 2022-06-08 PROCEDURE — 99215 OFFICE O/P EST HI 40 MIN: CPT

## 2022-06-08 NOTE — HISTORY OF PRESENT ILLNESS
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  On 1/19/2021 she presented with  visual changes since this morning.  She noticed "blurry" vision in her "L eye".  She came to the ED for hyperglycemia. Patient is poorly compliant with her medications and has not been seeing doctors.\par CT head (1/19/2021) was unremarkable. CTA neck (1/19/2021) showed minimal plaque in the proximal right ICA. 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 \par MRI negative for acute infarct \par \par Today she remains with diplopia.  She is on ASA for unknown reasons as directed by PCP.\par \par 8/13/21.  She came to the office today, accompanied by her son and her young granddaughter.\par \par Her diplopia in 1/21 resolved after perhaps a month.  In late 6/21, she developed recurrent diplopia, although she was an ambiguous historian and it was difficult to elicit a detailed description of this.  She stated that once again the diplopia is improving.\par \par Repeat MRI brain (6/30/2021) to my eye showed moderate periventricular and subcortical hyperintense foci of presumed vascular origin, with demyelinating lesions being possible but less likely.  No acute infarction.\par \par Carotid Doppler (8/13/2021) showed mild heterogeneous plaque on the right with approximately 45% stenosis.  On the left there was mild heterogeneous plaque with less than 40% stenosis.  The extracranial vertebral arteries showed anterograde flow with normal resistance.\par \par Transcranial Doppler (8/13/2021) showed normal ophthalmic arteries, carotid siphons, vertebral arteries and basilar artery.  We were unable to insonate the remainder of the [place] due to poor temporal acoustic windows.\par \par 6/8/22\par She presents today with her son. She has not had any recurrent episodes of diplopia. She denies any new focal neurologic symptoms.\par \par Carotid Doppler (5/2/2022) showed mild heterogeneous plaque on the right with approximately 45% stenosis.  On the left there was mild heterogeneous plaque with less than 40% stenosis.  The extracranial vertebral arteries showed anterograde flow with normal resistance. Thyroid nodules incidentally detected. No significant change from prior exam in 8/21.\par \par Transcranial Doppler (5/2/2022) showed increased velocity in the Right Carotid siphon consistent with probable stenosis. Vertebral arteries and basilar artery are within normal limits.  We were unable to insonate the remainder of the [place] due to poor temporal acoustic windows. No significant change from prior exam in 8/21 except right carotid siphon stenosis detected on current exam.\par

## 2022-06-08 NOTE — CONSULT LETTER
[FreeTextEntry2] : Yesi Hudson MD [FreeTextEntry3] : Richard B. Libman, MD, FRCPC\par , Neurology \par Co-Director, Stroke Center\par Professor of Neurology\par [facility] [employer] [facility]\par

## 2022-06-08 NOTE — DISCUSSION/SUMMARY
[FreeTextEntry1] : 8/13/21.  Her neurologic exam shows a subtle right eye abduction deficit consistent with a mild right 6th nerve palsy; decreased vibratory sense at the toes, probably reflecting diabetic peripheral neuropathy.\par \par To summarize, on 1/19/2021 she presented with diplopia due to a left 6th nerve palsy.  Most likely this was an "ischemic" or "diabetic" 6th nerve palsy and there was no evidence of stroke.  She improved after about a month.  In late 6/21, she developed recurrent horizontal diplopia, apparently due to a right 6th nerve palsy, now also improving.  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  Given multiple hyperintensities on MRI, however, Dr. Guerrero is also investigating her for the less likely possibility of MS.  She will be following up with Dr. Alves for her 6th nerve palsy.\par \par From the neurovascular standpoint, there is no clearly defined role for aspirin for primary cardiovascular prevention.  I had initially suggested that she could stop the aspirin.  Given a moderate degree on MRI of what is likely to be white matter hyperintensities of presumed vascular origin, however, I would reverse this recommendation and suggest that she continue aspirin.\par \par She has an incidental, unruptured tiny ACOM aneurysm which should be monitored for stability, and I would suggest that she have repeat CTA head on a yearly basis (next CTA in about 2/22).\par \par Her blood pressure was markedly elevated in my office, and she will be following up with you for this issue.\par \par She has some degree of daytime fatigue, raising the possibility of sleep apnea, and I have requested a home sleep study.\par \par 6/8/22\par - Overall she is neurologically intact and doing well.\par - She should have a repeat CTA to assess stability of 1mm  ACOMM aneurysm.\par - Transcranial Doppler from 5/2/22 showed increased velocity in the Right Intracranial Carotid siphon consistent with possible stenosis. CTA will help determine  if this represents actual asymptomatic intracranial stenosis versus artifact.\par - Carotid Doppler from 5/2/22 was unremarkable. Bilateral thyroid nodules were incidentally detected. I defer to your judgement as to whether further workup and management of the thyroid nodules is warranted. \par - She should continue to benefit from vascular risk factor control.\par - She has daytime fatigue, raising concern for obstructive sleep apnea. I have again requested a home sleep study.\par \par She can follow up in 6 months with repeat carotid and transcranial Dopplers. I hope she remains free of serious trouble. \par

## 2022-06-15 ENCOUNTER — OUTPATIENT (OUTPATIENT)
Dept: OUTPATIENT SERVICES | Facility: HOSPITAL | Age: 62
LOS: 1 days | End: 2022-06-15

## 2022-06-15 ENCOUNTER — APPOINTMENT (OUTPATIENT)
Dept: INTERNAL MEDICINE | Facility: CLINIC | Age: 62
End: 2022-06-15
Payer: MEDICAID

## 2022-06-15 VITALS
BODY MASS INDEX: 26.05 KG/M2 | TEMPERATURE: 97.7 F | SYSTOLIC BLOOD PRESSURE: 122 MMHG | HEIGHT: 63 IN | WEIGHT: 147 LBS | RESPIRATION RATE: 18 BRPM | HEART RATE: 89 BPM | DIASTOLIC BLOOD PRESSURE: 68 MMHG

## 2022-06-15 DIAGNOSIS — R90.82 WHITE MATTER DISEASE, UNSPECIFIED: ICD-10-CM

## 2022-06-15 DIAGNOSIS — I10 ESSENTIAL (PRIMARY) HYPERTENSION: ICD-10-CM

## 2022-06-15 DIAGNOSIS — E11.65 TYPE 2 DIABETES MELLITUS WITH HYPERGLYCEMIA: ICD-10-CM

## 2022-06-15 DIAGNOSIS — I99.8 WHITE MATTER DISEASE, UNSPECIFIED: ICD-10-CM

## 2022-06-15 DIAGNOSIS — E78.5 HYPERLIPIDEMIA, UNSPECIFIED: ICD-10-CM

## 2022-06-15 PROCEDURE — 99214 OFFICE O/P EST MOD 30 MIN: CPT | Mod: 95

## 2022-06-15 NOTE — HISTORY OF PRESENT ILLNESS
[Home] : at home, [unfilled] , at the time of the visit. [Medical Office: ([facility])___] : at the medical office located in  [Family Member] : family member [Verbal consent obtained from patient] : the patient, [unfilled] [de-identified] : Follow up diabetes, hypertension and hyperlipidemia, patient presents for telehealth follow up without any concerns.  She is doing well for the most part, had run out of her baby aspirin and was given a sample by the Neurologist at that office visit a few days ago as she didn't get a chance to  her medications from the pharmacy. \par \par No active issues, finding fingersticks randomly around 160s for the most part, fasting sugars are around 110 to 150.   Denies any hypoglycemia.\par \par Chart review of Neurology office visit reveals a need for patient to have sleep study done as she had been having daytime fatigue, but confirmed with patient during office visit that she is not having any daytime fatigue.  Sleeps well at night.  Son has yet to receive the home study test and has not received a call from the office when he was told that it would be sent to their home.  Patient believes that the baby in the house could be wakening her up in the middle of the night.\par Labs placed for bmp, a1c and urine albumin\par educated about baby aspirin\par encouraged call to neurology for home sleep study, although patient denying daytime fatigue, reports baby in home interupting her sleep but not having problems with sleep at night.\par \par If needs sleep study at lab, will do bang survey at next visit in 3 months.

## 2022-06-15 NOTE — PHYSICAL EXAM
[No Acute Distress] : no acute distress [Well-Appearing] : well-appearing [Normal Voice/Communication] : normal voice/communication [No Respiratory Distress] : no respiratory distress  [de-identified] : limited exam conducted over video for telehealth visit today

## 2022-06-20 ENCOUNTER — OUTPATIENT (OUTPATIENT)
Dept: OUTPATIENT SERVICES | Facility: HOSPITAL | Age: 62
LOS: 1 days | End: 2022-06-20
Payer: MEDICAID

## 2022-06-20 ENCOUNTER — APPOINTMENT (OUTPATIENT)
Dept: CT IMAGING | Facility: IMAGING CENTER | Age: 62
End: 2022-06-20
Payer: MEDICAID

## 2022-06-20 DIAGNOSIS — I67.1 CEREBRAL ANEURYSM, NONRUPTURED: ICD-10-CM

## 2022-06-20 PROCEDURE — 70496 CT ANGIOGRAPHY HEAD: CPT

## 2022-06-20 PROCEDURE — 70496 CT ANGIOGRAPHY HEAD: CPT | Mod: 26

## 2022-06-22 ENCOUNTER — NON-APPOINTMENT (OUTPATIENT)
Age: 62
End: 2022-06-22

## 2022-06-23 ENCOUNTER — NON-APPOINTMENT (OUTPATIENT)
Age: 62
End: 2022-06-23

## 2022-08-16 ENCOUNTER — APPOINTMENT (OUTPATIENT)
Dept: ENDOCRINOLOGY | Facility: CLINIC | Age: 62
End: 2022-08-16

## 2022-09-20 ENCOUNTER — TRANSCRIPTION ENCOUNTER (OUTPATIENT)
Age: 62
End: 2022-09-20

## 2022-09-28 ENCOUNTER — NON-APPOINTMENT (OUTPATIENT)
Age: 62
End: 2022-09-28

## 2022-10-03 ENCOUNTER — NON-APPOINTMENT (OUTPATIENT)
Age: 62
End: 2022-10-03

## 2022-12-05 ENCOUNTER — RX RENEWAL (OUTPATIENT)
Age: 62
End: 2022-12-05

## 2022-12-08 ENCOUNTER — NON-APPOINTMENT (OUTPATIENT)
Age: 62
End: 2022-12-08

## 2023-03-29 NOTE — OCCUPATIONAL THERAPY INITIAL EVALUATION ADULT - LEVEL OF INDEPENDENCE: EATING, OT EVAL
From: Jammie Roes  To: Melquiades Yves Falguniyanivgarrett  Sent: 3/29/2023 1:12 PM CDT  Subject: Meds through mail order company    This message is being sent by Michelle Rose on behalf of Jammie Rose.    Hello,    I need these meds sent to [facility] in [place] per my insurance plan.    Thank you   independent

## 2023-05-22 ENCOUNTER — APPOINTMENT (OUTPATIENT)
Dept: NEUROLOGY | Facility: CLINIC | Age: 63
End: 2023-05-22

## 2023-07-12 ENCOUNTER — RX RENEWAL (OUTPATIENT)
Age: 63
End: 2023-07-12

## 2023-07-12 RX ORDER — GABAPENTIN 100 MG/1
100 CAPSULE ORAL
Qty: 90 | Refills: 1 | Status: ACTIVE | COMMUNITY
Start: 2021-11-03 | End: 1900-01-01

## 2023-07-12 RX ORDER — ATORVASTATIN CALCIUM 80 MG/1
80 TABLET, FILM COATED ORAL
Qty: 90 | Refills: 1 | Status: ACTIVE | COMMUNITY
Start: 2021-02-02 | End: 1900-01-01

## 2023-07-12 RX ORDER — BLOOD SUGAR DIAGNOSTIC
STRIP MISCELLANEOUS 3 TIMES DAILY
Qty: 3 | Refills: 1 | Status: ACTIVE | COMMUNITY
Start: 2021-07-15 | End: 1900-01-01

## 2023-07-12 RX ORDER — LANCETS
EACH MISCELLANEOUS
Qty: 2 | Refills: 1 | Status: ACTIVE | COMMUNITY
Start: 2021-03-19 | End: 1900-01-01

## 2023-07-12 RX ORDER — ALCOHOL ANTISEPTIC PADS
70 PADS, MEDICATED (EA) TOPICAL
Qty: 3 | Refills: 1 | Status: ACTIVE | COMMUNITY
Start: 2021-08-04 | End: 1900-01-01

## 2023-11-04 NOTE — PATIENT PROFILE ADULT - ARRIVAL FROM
For pain use acetaminophen (Tylenol) or ibuprofen (Motrin / Advil), unless prescribed medications that have acetaminophen or ibuprofen (or similar medications) in it. You can take over the counter acetaminophen tablets (1 - 2 tablets of the 500-mg strength every 6 hours) or ibuprofen tablets (2 tablets every 4 hours). Soak in a hot shower or bath tub. You will have more aches and pains tomorrow, but should feel better in several days. PLEASE RETURN TO THE EMERGENCY DEPARTMENT IMMEDIATELY for worsening of pain, decrease sensation to arms or legs, inability to move arms or legs, shortness of breath, severe chest pain, excessive nausea or vomiting, notice any bruising to your abdomen or have increase in abdominal pain, or if you develop any concerning symptoms such as: high fever not relieved by acetaminophen (Tylenol) and/or ibuprofen (Motrin / Advil), chills, feeling of your heart fluttering or racing, persistent nausea and/or vomiting, vomiting up blood, blood in your stool, loss of consciousness, numbness, weakness or tingling in the arms or legs or change in color of the extremities, changes in mental status, persistent headache, blurry vision, loss of bladder / bowel control, unable to follow up with your physician, or other any other care or concern.
Home

## 2023-11-10 ENCOUNTER — TRANSCRIPTION ENCOUNTER (OUTPATIENT)
Age: 63
End: 2023-11-10

## 2023-11-10 RX ORDER — LOSARTAN POTASSIUM 100 MG/1
100 TABLET, FILM COATED ORAL
Qty: 90 | Refills: 0 | Status: ACTIVE | COMMUNITY
Start: 2021-04-20 | End: 1900-01-01

## 2023-11-10 RX ORDER — INSULIN GLARGINE 100 [IU]/ML
100 INJECTION, SOLUTION SUBCUTANEOUS DAILY
Qty: 3 | Refills: 0 | Status: ACTIVE | COMMUNITY
Start: 2021-02-02 | End: 1900-01-01

## 2023-11-10 RX ORDER — AMLODIPINE BESYLATE 10 MG/1
10 TABLET ORAL
Qty: 90 | Refills: 0 | Status: ACTIVE | COMMUNITY
Start: 2021-09-08 | End: 1900-01-01

## 2023-11-10 RX ORDER — PEN NEEDLE, DIABETIC, SAFETY 31 G X1/4"
31G X 6 MM NEEDLE, DISPOSABLE MISCELLANEOUS
Qty: 1 | Refills: 0 | Status: ACTIVE | COMMUNITY
Start: 2021-02-02 | End: 1900-01-01

## 2023-11-10 RX ORDER — CHLORTHALIDONE 25 MG/1
25 TABLET ORAL
Qty: 45 | Refills: 0 | Status: ACTIVE | COMMUNITY
Start: 2021-07-13 | End: 1900-01-01

## 2023-11-10 RX ORDER — REPAGLINIDE 1 MG/1
1 TABLET ORAL 3 TIMES DAILY
Qty: 270 | Refills: 0 | Status: ACTIVE | COMMUNITY
Start: 2021-02-02 | End: 1900-01-01
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
## 2024-06-21 ENCOUNTER — RX RENEWAL (OUTPATIENT)
Age: 64
End: 2024-06-21

## 2024-08-08 NOTE — PROGRESS NOTE ADULT - PROBLEM SELECTOR PLAN 1
- Neurology consulted, recs appreciated. 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  - Can also be due to thyroid disease (thyroid-associated ophthalmopathy). TSH low on admission. PUENTE uptake scan on 6/9/21 with possible autonomously functioning nodule. Repeat US thyroid/parathyroid to evaluate nodule and for possible FNA biopsy. F/u free T4 and total T3. outpatient follow up   - MRI brain and orbits w/w/o contrast ordered  - SBP goal <180 with gradual normotension  - Ophthalmology consult appreciated   - Monitor on tele    MRIs noted  ? MS  discussed with neuro team  adviced ot D/c patient and follow up with MS specialist  no further intervention recommended  D/C planing with outpatient followup given to the patient [Hyperlipidemia] : hyperlipidemia [Other: ____] : [unfilled]

## 2025-02-16 NOTE — PROGRESS NOTE ADULT - PROBLEM SELECTOR PROBLEM 2
Report called to LAUREN Alejo at [facility]. All questions answered. Patient and belongings taken out via [facility].  called and updated that patient has been picked up and being taken to [facility].  
Type 2 diabetes mellitus with hyperglycemia, with long-term current use of insulin
Type 2 diabetes mellitus with hyperglycemia, with long-term current use of insulin
Thyroid nodule

## 2025-06-16 NOTE — END OF VISIT
Home Rx: metoprolol succinate 50 mg BID and Xarelto 15 mg daily  Current Rx: Metoprolol succinate 75 mg p.o. twice daily  Anticoagulation held given anemia   Rates are controlled in A-fib, continue higher dose of Toprol-XL 75 mg p.o. twice daily  Continue telemetry   [FreeTextEntry3] : Pt with T2DM, with improved control.  CHeck hba1c .  Continue basaglar, take  dose consistently.   Start repaglinide.  Repeat lipids.\par Will switch from ACE to ARB to see if cough improves.  Also with hyperthyroidism and large thyroid nodule.  Check uptake/scan.  If not functional, will recommend FNA